# Patient Record
Sex: FEMALE | Race: WHITE | NOT HISPANIC OR LATINO | Employment: FULL TIME | URBAN - METROPOLITAN AREA
[De-identification: names, ages, dates, MRNs, and addresses within clinical notes are randomized per-mention and may not be internally consistent; named-entity substitution may affect disease eponyms.]

---

## 2017-01-28 ENCOUNTER — OFFICE VISIT (OUTPATIENT)
Dept: URGENT CARE | Facility: CLINIC | Age: 72
End: 2017-01-28
Payer: COMMERCIAL

## 2017-01-28 ENCOUNTER — TRANSCRIBE ORDERS (OUTPATIENT)
Dept: URGENT CARE | Facility: CLINIC | Age: 72
End: 2017-01-28

## 2017-01-28 ENCOUNTER — APPOINTMENT (OUTPATIENT)
Dept: LAB | Facility: CLINIC | Age: 72
End: 2017-01-28
Attending: PHYSICIAN ASSISTANT
Payer: COMMERCIAL

## 2017-01-28 DIAGNOSIS — R35.0 FREQUENCY OF MICTURITION: ICD-10-CM

## 2017-01-28 LAB — GLUCOSE SERPL-MCNC: 183 MG/DL (ref 65–140)

## 2017-01-28 PROCEDURE — 82948 REAGENT STRIP/BLOOD GLUCOSE: CPT

## 2017-01-28 PROCEDURE — 87086 URINE CULTURE/COLONY COUNT: CPT

## 2017-01-28 PROCEDURE — G0383 LEV 4 HOSP TYPE B ED VISIT: HCPCS

## 2017-01-30 LAB — BACTERIA UR CULT: NORMAL

## 2017-02-15 ENCOUNTER — TRANSCRIBE ORDERS (OUTPATIENT)
Dept: ADMINISTRATIVE | Facility: HOSPITAL | Age: 72
End: 2017-02-15

## 2017-02-15 DIAGNOSIS — M25.511 RIGHT SHOULDER PAIN, UNSPECIFIED CHRONICITY: ICD-10-CM

## 2017-02-15 DIAGNOSIS — M25.512 LEFT SHOULDER PAIN, UNSPECIFIED CHRONICITY: Primary | ICD-10-CM

## 2017-02-20 ENCOUNTER — HOSPITAL ENCOUNTER (OUTPATIENT)
Dept: MRI IMAGING | Facility: CLINIC | Age: 72
Discharge: HOME | End: 2017-02-20
Payer: COMMERCIAL

## 2017-02-20 DIAGNOSIS — M25.512 LEFT SHOULDER PAIN, UNSPECIFIED CHRONICITY: ICD-10-CM

## 2017-02-20 DIAGNOSIS — M25.511 RIGHT SHOULDER PAIN, UNSPECIFIED CHRONICITY: ICD-10-CM

## 2017-02-20 PROCEDURE — 73221 MRI JOINT UPR EXTREM W/O DYE: CPT

## 2017-05-05 ENCOUNTER — TRANSCRIBE ORDERS (OUTPATIENT)
Dept: LAB | Facility: CLINIC | Age: 72
End: 2017-05-05

## 2017-05-05 ENCOUNTER — APPOINTMENT (OUTPATIENT)
Dept: LAB | Facility: CLINIC | Age: 72
End: 2017-05-05
Payer: COMMERCIAL

## 2017-05-05 DIAGNOSIS — R53.83 FATIGUE, UNSPECIFIED TYPE: Primary | ICD-10-CM

## 2017-05-05 DIAGNOSIS — Z13.9 SCREENING FOR CONDITION: ICD-10-CM

## 2017-05-05 DIAGNOSIS — E11.69 TYPE 2 DIABETES MELLITUS WITH OTHER SPECIFIED COMPLICATION (HCC): ICD-10-CM

## 2017-05-05 DIAGNOSIS — E83.52 HYPERCALCEMIA: ICD-10-CM

## 2017-05-05 DIAGNOSIS — E79.0 URICACIDEMIA: ICD-10-CM

## 2017-05-05 DIAGNOSIS — N20.0 URIC ACID NEPHROLITHIASIS: ICD-10-CM

## 2017-05-05 LAB
ALBUMIN SERPL BCP-MCNC: 3.8 G/DL (ref 3.5–5)
ALP SERPL-CCNC: 54 U/L (ref 46–116)
ALT SERPL W P-5'-P-CCNC: 22 U/L (ref 12–78)
ANION GAP SERPL CALCULATED.3IONS-SCNC: 8 MMOL/L (ref 4–13)
AST SERPL W P-5'-P-CCNC: 12 U/L (ref 5–45)
BASOPHILS # BLD AUTO: 0.02 THOUSANDS/ΜL (ref 0–0.1)
BASOPHILS NFR BLD AUTO: 0 % (ref 0–1)
BILIRUB SERPL-MCNC: 0.59 MG/DL (ref 0.2–1)
BUN SERPL-MCNC: 9 MG/DL (ref 5–25)
CALCIUM SERPL-MCNC: 9.7 MG/DL (ref 8.3–10.1)
CHLORIDE SERPL-SCNC: 107 MMOL/L (ref 100–108)
CHOLEST SERPL-MCNC: 125 MG/DL (ref 50–200)
CO2 SERPL-SCNC: 28 MMOL/L (ref 21–32)
CREAT SERPL-MCNC: 0.78 MG/DL (ref 0.6–1.3)
EOSINOPHIL # BLD AUTO: 0.17 THOUSAND/ΜL (ref 0–0.61)
EOSINOPHIL NFR BLD AUTO: 2 % (ref 0–6)
ERYTHROCYTE [DISTWIDTH] IN BLOOD BY AUTOMATED COUNT: 14.2 % (ref 11.6–15.1)
EST. AVERAGE GLUCOSE BLD GHB EST-MCNC: 134 MG/DL
GFR SERPL CREATININE-BSD FRML MDRD: >60 ML/MIN/1.73SQ M
GLUCOSE P FAST SERPL-MCNC: 127 MG/DL (ref 65–99)
HBA1C MFR BLD: 6.3 % (ref 4.2–6.3)
HCT VFR BLD AUTO: 40.3 % (ref 34.8–46.1)
HDLC SERPL-MCNC: 33 MG/DL (ref 40–60)
HGB BLD-MCNC: 13.3 G/DL (ref 11.5–15.4)
LDLC SERPL CALC-MCNC: 43 MG/DL (ref 0–100)
LYMPHOCYTES # BLD AUTO: 2.13 THOUSANDS/ΜL (ref 0.6–4.47)
LYMPHOCYTES NFR BLD AUTO: 27 % (ref 14–44)
MCH RBC QN AUTO: 28.8 PG (ref 26.8–34.3)
MCHC RBC AUTO-ENTMCNC: 33 G/DL (ref 31.4–37.4)
MCV RBC AUTO: 87 FL (ref 82–98)
MONOCYTES # BLD AUTO: 0.52 THOUSAND/ΜL (ref 0.17–1.22)
MONOCYTES NFR BLD AUTO: 7 % (ref 4–12)
NEUTROPHILS # BLD AUTO: 4.98 THOUSANDS/ΜL (ref 1.85–7.62)
NEUTS SEG NFR BLD AUTO: 64 % (ref 43–75)
NRBC BLD AUTO-RTO: 0 /100 WBCS
PLATELET # BLD AUTO: 204 THOUSANDS/UL (ref 149–390)
PMV BLD AUTO: 11.4 FL (ref 8.9–12.7)
POTASSIUM SERPL-SCNC: 4 MMOL/L (ref 3.5–5.3)
PROT SERPL-MCNC: 7.4 G/DL (ref 6.4–8.2)
PTH-INTACT SERPL-MCNC: 90.1 PG/ML (ref 14–72)
RBC # BLD AUTO: 4.62 MILLION/UL (ref 3.81–5.12)
SODIUM SERPL-SCNC: 143 MMOL/L (ref 136–145)
TRIGL SERPL-MCNC: 244 MG/DL
TSH SERPL DL<=0.05 MIU/L-ACNC: 2.6 UIU/ML (ref 0.36–3.74)
URATE SERPL-MCNC: 5.2 MG/DL (ref 2–6.8)
WBC # BLD AUTO: 7.84 THOUSAND/UL (ref 4.31–10.16)

## 2017-05-05 PROCEDURE — 36415 COLL VENOUS BLD VENIPUNCTURE: CPT

## 2017-05-05 PROCEDURE — 80061 LIPID PANEL: CPT

## 2017-05-05 PROCEDURE — 84443 ASSAY THYROID STIM HORMONE: CPT

## 2017-05-05 PROCEDURE — 83970 ASSAY OF PARATHORMONE: CPT

## 2017-05-05 PROCEDURE — 80053 COMPREHEN METABOLIC PANEL: CPT

## 2017-05-05 PROCEDURE — 84550 ASSAY OF BLOOD/URIC ACID: CPT

## 2017-05-05 PROCEDURE — 85025 COMPLETE CBC W/AUTO DIFF WBC: CPT

## 2017-05-05 PROCEDURE — 83036 HEMOGLOBIN GLYCOSYLATED A1C: CPT

## 2017-11-15 ENCOUNTER — TRANSCRIBE ORDERS (OUTPATIENT)
Dept: LAB | Facility: CLINIC | Age: 72
End: 2017-11-15

## 2017-11-15 ENCOUNTER — APPOINTMENT (OUTPATIENT)
Dept: LAB | Facility: CLINIC | Age: 72
End: 2017-11-15
Payer: COMMERCIAL

## 2017-11-15 DIAGNOSIS — Z13.9 SCREENING FOR CONDITION: ICD-10-CM

## 2017-11-15 DIAGNOSIS — R07.81 PLEURITIC CHEST PAIN: ICD-10-CM

## 2017-11-15 DIAGNOSIS — R00.2 PALPITATIONS: ICD-10-CM

## 2017-11-15 DIAGNOSIS — I10 ESSENTIAL HYPERTENSION, MALIGNANT: ICD-10-CM

## 2017-11-15 DIAGNOSIS — R53.83 FATIGUE, UNSPECIFIED TYPE: Primary | ICD-10-CM

## 2017-11-15 DIAGNOSIS — Z13.220 SCREENING FOR LIPOID DISORDERS: ICD-10-CM

## 2017-11-15 LAB
ALBUMIN SERPL BCP-MCNC: 3.9 G/DL (ref 3.5–5)
ALP SERPL-CCNC: 48 U/L (ref 46–116)
ALT SERPL W P-5'-P-CCNC: 24 U/L (ref 12–78)
ANION GAP SERPL CALCULATED.3IONS-SCNC: 8 MMOL/L (ref 4–13)
AST SERPL W P-5'-P-CCNC: 13 U/L (ref 5–45)
BASOPHILS # BLD AUTO: 0.04 THOUSANDS/ΜL (ref 0–0.1)
BASOPHILS NFR BLD AUTO: 1 % (ref 0–1)
BILIRUB DIRECT SERPL-MCNC: 0.14 MG/DL (ref 0–0.2)
BILIRUB SERPL-MCNC: 0.57 MG/DL (ref 0.2–1)
BUN SERPL-MCNC: 13 MG/DL (ref 5–25)
CALCIUM ALBUM COR SERPL-MCNC: 11 MG/DL (ref 8.3–10.1)
CALCIUM SERPL-MCNC: 10.9 MG/DL (ref 8.3–10.1)
CHLORIDE SERPL-SCNC: 107 MMOL/L (ref 100–108)
CHOLEST SERPL-MCNC: 159 MG/DL (ref 50–200)
CO2 SERPL-SCNC: 26 MMOL/L (ref 21–32)
CREAT SERPL-MCNC: 0.84 MG/DL (ref 0.6–1.3)
EOSINOPHIL # BLD AUTO: 0.22 THOUSAND/ΜL (ref 0–0.61)
EOSINOPHIL NFR BLD AUTO: 3 % (ref 0–6)
ERYTHROCYTE [DISTWIDTH] IN BLOOD BY AUTOMATED COUNT: 13.3 % (ref 11.6–15.1)
GFR SERPL CREATININE-BSD FRML MDRD: 70 ML/MIN/1.73SQ M
GLUCOSE P FAST SERPL-MCNC: 146 MG/DL (ref 65–99)
HCT VFR BLD AUTO: 39.8 % (ref 34.8–46.1)
HDLC SERPL-MCNC: 35 MG/DL (ref 40–60)
HGB BLD-MCNC: 13.8 G/DL (ref 11.5–15.4)
LYMPHOCYTES # BLD AUTO: 2.55 THOUSANDS/ΜL (ref 0.6–4.47)
LYMPHOCYTES NFR BLD AUTO: 34 % (ref 14–44)
MCH RBC QN AUTO: 30.1 PG (ref 26.8–34.3)
MCHC RBC AUTO-ENTMCNC: 34.7 G/DL (ref 31.4–37.4)
MCV RBC AUTO: 87 FL (ref 82–98)
MONOCYTES # BLD AUTO: 0.55 THOUSAND/ΜL (ref 0.17–1.22)
MONOCYTES NFR BLD AUTO: 7 % (ref 4–12)
NEUTROPHILS # BLD AUTO: 4.08 THOUSANDS/ΜL (ref 1.85–7.62)
NEUTS SEG NFR BLD AUTO: 55 % (ref 43–75)
NRBC BLD AUTO-RTO: 0 /100 WBCS
PLATELET # BLD AUTO: 194 THOUSANDS/UL (ref 149–390)
PMV BLD AUTO: 11.4 FL (ref 8.9–12.7)
POTASSIUM SERPL-SCNC: 4.1 MMOL/L (ref 3.5–5.3)
PROT SERPL-MCNC: 7.2 G/DL (ref 6.4–8.2)
RBC # BLD AUTO: 4.58 MILLION/UL (ref 3.81–5.12)
SODIUM SERPL-SCNC: 141 MMOL/L (ref 136–145)
TRIGL SERPL-MCNC: 407 MG/DL
TSH SERPL DL<=0.05 MIU/L-ACNC: 2.8 UIU/ML (ref 0.36–3.74)
WBC # BLD AUTO: 7.45 THOUSAND/UL (ref 4.31–10.16)

## 2017-11-15 PROCEDURE — 82248 BILIRUBIN DIRECT: CPT

## 2017-11-15 PROCEDURE — 80053 COMPREHEN METABOLIC PANEL: CPT

## 2017-11-15 PROCEDURE — 85025 COMPLETE CBC W/AUTO DIFF WBC: CPT

## 2017-11-15 PROCEDURE — 80061 LIPID PANEL: CPT

## 2017-11-15 PROCEDURE — 84443 ASSAY THYROID STIM HORMONE: CPT

## 2017-11-15 PROCEDURE — 36415 COLL VENOUS BLD VENIPUNCTURE: CPT

## 2018-01-22 ENCOUNTER — TRANSCRIBE ORDERS (OUTPATIENT)
Dept: LAB | Facility: CLINIC | Age: 73
End: 2018-01-22

## 2018-01-22 ENCOUNTER — APPOINTMENT (OUTPATIENT)
Dept: LAB | Facility: CLINIC | Age: 73
End: 2018-01-22
Payer: COMMERCIAL

## 2018-01-22 DIAGNOSIS — IMO0001 UNCONTROLLED DIABETES MELLITUS TYPE 2 WITHOUT COMPLICATIONS, UNSPECIFIED LONG TERM INSULIN USE STATUS: ICD-10-CM

## 2018-01-22 DIAGNOSIS — E83.52 HYPERCALCEMIA: Primary | ICD-10-CM

## 2018-01-22 LAB
25(OH)D3 SERPL-MCNC: 11.7 NG/ML (ref 30–100)
ALBUMIN SERPL BCP-MCNC: 4.1 G/DL (ref 3.5–5)
ALP SERPL-CCNC: 54 U/L (ref 46–116)
ALT SERPL W P-5'-P-CCNC: 26 U/L (ref 12–78)
ANION GAP SERPL CALCULATED.3IONS-SCNC: 6 MMOL/L (ref 4–13)
AST SERPL W P-5'-P-CCNC: 13 U/L (ref 5–45)
BASOPHILS # BLD AUTO: 0.03 THOUSANDS/ΜL (ref 0–0.1)
BASOPHILS NFR BLD AUTO: 1 % (ref 0–1)
BILIRUB SERPL-MCNC: 0.51 MG/DL (ref 0.2–1)
BUN SERPL-MCNC: 16 MG/DL (ref 5–25)
CALCIUM ALBUM COR SERPL-MCNC: 10.6 MG/DL (ref 8.3–10.1)
CALCIUM SERPL-MCNC: 10.7 MG/DL (ref 8.3–10.1)
CHLORIDE SERPL-SCNC: 108 MMOL/L (ref 100–108)
CO2 SERPL-SCNC: 26 MMOL/L (ref 21–32)
CORTIS AM PEAK SERPL-MCNC: 25.6 UG/DL (ref 4.2–22.4)
CREAT SERPL-MCNC: 0.95 MG/DL (ref 0.6–1.3)
EOSINOPHIL # BLD AUTO: 0.21 THOUSAND/ΜL (ref 0–0.61)
EOSINOPHIL NFR BLD AUTO: 4 % (ref 0–6)
ERYTHROCYTE [DISTWIDTH] IN BLOOD BY AUTOMATED COUNT: 13.1 % (ref 11.6–15.1)
EST. AVERAGE GLUCOSE BLD GHB EST-MCNC: 146 MG/DL
GFR SERPL CREATININE-BSD FRML MDRD: 60 ML/MIN/1.73SQ M
GLUCOSE P FAST SERPL-MCNC: 159 MG/DL (ref 65–99)
HBA1C MFR BLD: 6.7 % (ref 4.2–6.3)
HCT VFR BLD AUTO: 40 % (ref 34.8–46.1)
HGB BLD-MCNC: 13.5 G/DL (ref 11.5–15.4)
LYMPHOCYTES # BLD AUTO: 1.91 THOUSANDS/ΜL (ref 0.6–4.47)
LYMPHOCYTES NFR BLD AUTO: 32 % (ref 14–44)
MCH RBC QN AUTO: 29.3 PG (ref 26.8–34.3)
MCHC RBC AUTO-ENTMCNC: 33.8 G/DL (ref 31.4–37.4)
MCV RBC AUTO: 87 FL (ref 82–98)
MONOCYTES # BLD AUTO: 0.4 THOUSAND/ΜL (ref 0.17–1.22)
MONOCYTES NFR BLD AUTO: 7 % (ref 4–12)
NEUTROPHILS # BLD AUTO: 3.45 THOUSANDS/ΜL (ref 1.85–7.62)
NEUTS SEG NFR BLD AUTO: 56 % (ref 43–75)
NRBC BLD AUTO-RTO: 0 /100 WBCS
PLATELET # BLD AUTO: 193 THOUSANDS/UL (ref 149–390)
PMV BLD AUTO: 11.6 FL (ref 8.9–12.7)
POTASSIUM SERPL-SCNC: 4 MMOL/L (ref 3.5–5.3)
PROT SERPL-MCNC: 7.4 G/DL (ref 6.4–8.2)
PTH-INTACT SERPL-MCNC: 68.2 PG/ML (ref 14–72)
RBC # BLD AUTO: 4.61 MILLION/UL (ref 3.81–5.12)
SODIUM SERPL-SCNC: 140 MMOL/L (ref 136–145)
TESTOST SERPL-MCNC: 75 NG/DL
TSH SERPL DL<=0.05 MIU/L-ACNC: 3.54 UIU/ML (ref 0.36–3.74)
WBC # BLD AUTO: 6.01 THOUSAND/UL (ref 4.31–10.16)

## 2018-01-22 PROCEDURE — 83970 ASSAY OF PARATHORMONE: CPT

## 2018-01-22 PROCEDURE — 86200 CCP ANTIBODY: CPT

## 2018-01-22 PROCEDURE — 84403 ASSAY OF TOTAL TESTOSTERONE: CPT

## 2018-01-22 PROCEDURE — 84443 ASSAY THYROID STIM HORMONE: CPT

## 2018-01-22 PROCEDURE — 85025 COMPLETE CBC W/AUTO DIFF WBC: CPT

## 2018-01-22 PROCEDURE — 82397 CHEMILUMINESCENT ASSAY: CPT

## 2018-01-22 PROCEDURE — 82306 VITAMIN D 25 HYDROXY: CPT

## 2018-01-22 PROCEDURE — 36415 COLL VENOUS BLD VENIPUNCTURE: CPT

## 2018-01-22 PROCEDURE — 80053 COMPREHEN METABOLIC PANEL: CPT

## 2018-01-22 PROCEDURE — 84166 PROTEIN E-PHORESIS/URINE/CSF: CPT

## 2018-01-22 PROCEDURE — 84165 PROTEIN E-PHORESIS SERUM: CPT

## 2018-01-22 PROCEDURE — 82652 VIT D 1 25-DIHYDROXY: CPT

## 2018-01-22 PROCEDURE — 82533 TOTAL CORTISOL: CPT

## 2018-01-22 PROCEDURE — 83036 HEMOGLOBIN GLYCOSYLATED A1C: CPT

## 2018-01-24 LAB
1,25(OH)2D3 SERPL-MCNC: 30.3 PG/ML (ref 19.9–79.3)
CCP IGA+IGG SERPL IA-ACNC: 5 UNITS (ref 0–19)

## 2018-01-25 LAB
ALBUMIN UR ELPH-MCNC: 100 %
ALPHA1 GLOB MFR UR ELPH: 0 %
ALPHA2 GLOB MFR UR ELPH: 0 %
B-GLOBULIN MFR UR ELPH: 0 %
GAMMA GLOB MFR UR ELPH: 0 %
PROT PATTERN UR ELPH-IMP: ABNORMAL
PROT UR-MCNC: 21 MG/DL

## 2018-01-26 LAB
ALBUMIN SERPL ELPH-MCNC: 4.62 G/DL (ref 3.5–5)
ALBUMIN SERPL ELPH-MCNC: 64.2 % (ref 52–65)
ALPHA1 GLOB SERPL ELPH-MCNC: 0.29 G/DL (ref 0.1–0.4)
ALPHA1 GLOB SERPL ELPH-MCNC: 4 % (ref 2.5–5)
ALPHA2 GLOB SERPL ELPH-MCNC: 0.87 G/DL (ref 0.4–1.2)
ALPHA2 GLOB SERPL ELPH-MCNC: 12.1 % (ref 7–13)
BETA GLOB ABNORMAL SERPL ELPH-MCNC: 0.48 G/DL (ref 0.4–0.8)
BETA1 GLOB SERPL ELPH-MCNC: 6.7 % (ref 5–13)
BETA2 GLOB SERPL ELPH-MCNC: 3.8 % (ref 2–8)
BETA2+GAMMA GLOB SERPL ELPH-MCNC: 0.27 G/DL (ref 0.2–0.5)
GAMMA GLOB ABNORMAL SERPL ELPH-MCNC: 0.66 G/DL (ref 0.5–1.6)
GAMMA GLOB SERPL ELPH-MCNC: 9.2 % (ref 12–22)
IGG/ALB SER: 1.79 {RATIO} (ref 1.1–1.8)
PROT PATTERN SERPL ELPH-IMP: ABNORMAL
PROT SERPL-MCNC: 7.2 G/DL (ref 6.4–8.2)

## 2018-01-26 PROCEDURE — 84165 PROTEIN E-PHORESIS SERUM: CPT | Performed by: PATHOLOGY

## 2018-01-26 PROCEDURE — 84166 PROTEIN E-PHORESIS/URINE/CSF: CPT | Performed by: PATHOLOGY

## 2018-01-29 LAB — PTH RELATED PROT SERPL-SCNC: <1.1 PMOL/L

## 2018-03-20 ENCOUNTER — APPOINTMENT (OUTPATIENT)
Dept: LAB | Facility: CLINIC | Age: 73
End: 2018-03-20
Payer: COMMERCIAL

## 2018-03-20 ENCOUNTER — TRANSCRIBE ORDERS (OUTPATIENT)
Dept: LAB | Facility: CLINIC | Age: 73
End: 2018-03-20

## 2018-03-20 DIAGNOSIS — E28.2 POLYCYSTIC OVARIES: ICD-10-CM

## 2018-03-20 DIAGNOSIS — IMO0001 UNCONTROLLED DIABETES MELLITUS TYPE 2 WITHOUT COMPLICATIONS, UNSPECIFIED LONG TERM INSULIN USE STATUS: ICD-10-CM

## 2018-03-20 DIAGNOSIS — E83.52 HYPERCALCEMIA: Primary | ICD-10-CM

## 2018-03-20 DIAGNOSIS — E83.52 HYPERCALCEMIA: ICD-10-CM

## 2018-03-20 LAB
ALBUMIN SERPL BCP-MCNC: 4 G/DL (ref 3.5–5)
ALP SERPL-CCNC: 44 U/L (ref 46–116)
ALT SERPL W P-5'-P-CCNC: 24 U/L (ref 12–78)
ANION GAP SERPL CALCULATED.3IONS-SCNC: 6 MMOL/L (ref 4–13)
AST SERPL W P-5'-P-CCNC: 12 U/L (ref 5–45)
BILIRUB SERPL-MCNC: 0.7 MG/DL (ref 0.2–1)
BUN SERPL-MCNC: 12 MG/DL (ref 5–25)
CALCIUM ALBUM COR SERPL-MCNC: 10.5 MG/DL (ref 8.3–10.1)
CALCIUM SERPL-MCNC: 10.5 MG/DL (ref 8.3–10.1)
CHLORIDE SERPL-SCNC: 108 MMOL/L (ref 100–108)
CHOLEST SERPL-MCNC: 123 MG/DL (ref 50–200)
CO2 SERPL-SCNC: 27 MMOL/L (ref 21–32)
CORTIS AM PEAK SERPL-MCNC: 29.9 UG/DL (ref 4.2–22.4)
CREAT SERPL-MCNC: 0.85 MG/DL (ref 0.6–1.3)
GFR SERPL CREATININE-BSD FRML MDRD: 69 ML/MIN/1.73SQ M
GLUCOSE P FAST SERPL-MCNC: 125 MG/DL (ref 65–99)
HDLC SERPL-MCNC: 34 MG/DL (ref 40–60)
LDLC SERPL CALC-MCNC: 49 MG/DL (ref 0–100)
POTASSIUM SERPL-SCNC: 3.7 MMOL/L (ref 3.5–5.3)
PROT SERPL-MCNC: 7 G/DL (ref 6.4–8.2)
SODIUM SERPL-SCNC: 141 MMOL/L (ref 136–145)
TESTOST SERPL-MCNC: 68 NG/DL
TRIGL SERPL-MCNC: 199 MG/DL

## 2018-03-20 PROCEDURE — 36415 COLL VENOUS BLD VENIPUNCTURE: CPT

## 2018-03-20 PROCEDURE — 84403 ASSAY OF TOTAL TESTOSTERONE: CPT

## 2018-03-20 PROCEDURE — 82533 TOTAL CORTISOL: CPT

## 2018-03-20 PROCEDURE — 80061 LIPID PANEL: CPT

## 2018-03-20 PROCEDURE — 80053 COMPREHEN METABOLIC PANEL: CPT

## 2018-06-14 ENCOUNTER — APPOINTMENT (OUTPATIENT)
Dept: LAB | Facility: CLINIC | Age: 73
End: 2018-06-14
Payer: COMMERCIAL

## 2018-06-14 ENCOUNTER — TRANSCRIBE ORDERS (OUTPATIENT)
Dept: LAB | Facility: CLINIC | Age: 73
End: 2018-06-14

## 2018-06-14 DIAGNOSIS — IMO0001 UNCONTROLLED DIABETES MELLITUS TYPE 2 WITHOUT COMPLICATIONS, UNSPECIFIED WHETHER LONG TERM INSULIN USE: ICD-10-CM

## 2018-06-14 DIAGNOSIS — IMO0001 UNCONTROLLED DIABETES MELLITUS TYPE 2 WITHOUT COMPLICATIONS, UNSPECIFIED WHETHER LONG TERM INSULIN USE: Primary | ICD-10-CM

## 2018-06-14 LAB
ALBUMIN SERPL BCP-MCNC: 4.1 G/DL (ref 3.5–5)
ALP SERPL-CCNC: 40 U/L (ref 46–116)
ALT SERPL W P-5'-P-CCNC: 29 U/L (ref 12–78)
ANION GAP SERPL CALCULATED.3IONS-SCNC: 7 MMOL/L (ref 4–13)
AST SERPL W P-5'-P-CCNC: 14 U/L (ref 5–45)
BILIRUB SERPL-MCNC: 0.7 MG/DL (ref 0.2–1)
BUN SERPL-MCNC: 14 MG/DL (ref 5–25)
CALCIUM ALBUM COR SERPL-MCNC: 11.2 MG/DL (ref 8.3–10.1)
CALCIUM SERPL-MCNC: 11.3 MG/DL (ref 8.3–10.1)
CHLORIDE SERPL-SCNC: 107 MMOL/L (ref 100–108)
CO2 SERPL-SCNC: 26 MMOL/L (ref 21–32)
CREAT SERPL-MCNC: 0.87 MG/DL (ref 0.6–1.3)
EST. AVERAGE GLUCOSE BLD GHB EST-MCNC: 131 MG/DL
GFR SERPL CREATININE-BSD FRML MDRD: 67 ML/MIN/1.73SQ M
GLUCOSE P FAST SERPL-MCNC: 93 MG/DL (ref 65–99)
HBA1C MFR BLD: 6.2 % (ref 4.2–6.3)
POTASSIUM SERPL-SCNC: 4.3 MMOL/L (ref 3.5–5.3)
PROT SERPL-MCNC: 7.1 G/DL (ref 6.4–8.2)
SODIUM SERPL-SCNC: 140 MMOL/L (ref 136–145)

## 2018-06-14 PROCEDURE — 80053 COMPREHEN METABOLIC PANEL: CPT

## 2018-06-14 PROCEDURE — 83036 HEMOGLOBIN GLYCOSYLATED A1C: CPT

## 2018-06-14 PROCEDURE — 36415 COLL VENOUS BLD VENIPUNCTURE: CPT

## 2018-07-17 ENCOUNTER — TRANSCRIBE ORDERS (OUTPATIENT)
Dept: LAB | Facility: CLINIC | Age: 73
End: 2018-07-17

## 2018-07-17 ENCOUNTER — APPOINTMENT (OUTPATIENT)
Dept: LAB | Facility: CLINIC | Age: 73
End: 2018-07-17
Payer: COMMERCIAL

## 2018-07-17 DIAGNOSIS — E11.8 UNCONTROLLED TYPE 2 DIABETES MELLITUS WITH COMPLICATION, UNSPECIFIED WHETHER LONG TERM INSULIN USE: ICD-10-CM

## 2018-07-17 DIAGNOSIS — E11.8 UNCONTROLLED TYPE 2 DIABETES MELLITUS WITH COMPLICATION, UNSPECIFIED WHETHER LONG TERM INSULIN USE: Primary | ICD-10-CM

## 2018-07-17 DIAGNOSIS — E11.65 UNCONTROLLED TYPE 2 DIABETES MELLITUS WITH COMPLICATION, UNSPECIFIED WHETHER LONG TERM INSULIN USE: ICD-10-CM

## 2018-07-17 DIAGNOSIS — E83.52 HYPERCALCEMIA: ICD-10-CM

## 2018-07-17 DIAGNOSIS — E55.9 AVITAMINOSIS D: ICD-10-CM

## 2018-07-17 DIAGNOSIS — E11.65 UNCONTROLLED TYPE 2 DIABETES MELLITUS WITH COMPLICATION, UNSPECIFIED WHETHER LONG TERM INSULIN USE: Primary | ICD-10-CM

## 2018-07-17 LAB
25(OH)D3 SERPL-MCNC: 31.7 NG/ML (ref 30–100)
ALBUMIN SERPL BCP-MCNC: 4.1 G/DL (ref 3.5–5)
ALP SERPL-CCNC: 45 U/L (ref 46–116)
ALT SERPL W P-5'-P-CCNC: 28 U/L (ref 12–78)
ANION GAP SERPL CALCULATED.3IONS-SCNC: 4 MMOL/L (ref 4–13)
AST SERPL W P-5'-P-CCNC: 17 U/L (ref 5–45)
BILIRUB SERPL-MCNC: 0.66 MG/DL (ref 0.2–1)
BUN SERPL-MCNC: 18 MG/DL (ref 5–25)
CALCIUM 24H UR-MCNC: 115.6 MG/24 HRS (ref 42–353)
CALCIUM ALBUM COR SERPL-MCNC: 11.6 MG/DL (ref 8.3–10.1)
CALCIUM SERPL-MCNC: 11.7 MG/DL (ref 8.3–10.1)
CHLORIDE SERPL-SCNC: 109 MMOL/L (ref 100–108)
CO2 SERPL-SCNC: 26 MMOL/L (ref 21–32)
CORTIS AM PEAK SERPL-MCNC: 26.8 UG/DL (ref 4.2–22.4)
CREAT SERPL-MCNC: 1.03 MG/DL (ref 0.6–1.3)
GFR SERPL CREATININE-BSD FRML MDRD: 54 ML/MIN/1.73SQ M
GLUCOSE SERPL-MCNC: 112 MG/DL (ref 65–140)
POTASSIUM SERPL-SCNC: 4.5 MMOL/L (ref 3.5–5.3)
PROT SERPL-MCNC: 7.5 G/DL (ref 6.4–8.2)
PTH-INTACT SERPL-MCNC: 71 PG/ML (ref 18.4–80.1)
SODIUM SERPL-SCNC: 139 MMOL/L (ref 136–145)
SPECIMEN VOL UR: 1700 ML

## 2018-07-17 PROCEDURE — 80053 COMPREHEN METABOLIC PANEL: CPT

## 2018-07-17 PROCEDURE — 82306 VITAMIN D 25 HYDROXY: CPT

## 2018-07-17 PROCEDURE — 82533 TOTAL CORTISOL: CPT

## 2018-07-17 PROCEDURE — 36415 COLL VENOUS BLD VENIPUNCTURE: CPT

## 2018-07-17 PROCEDURE — 82340 ASSAY OF CALCIUM IN URINE: CPT

## 2018-07-17 PROCEDURE — 83970 ASSAY OF PARATHORMONE: CPT

## 2018-08-20 ENCOUNTER — TRANSCRIBE ORDERS (OUTPATIENT)
Dept: LAB | Facility: CLINIC | Age: 73
End: 2018-08-20

## 2018-08-20 ENCOUNTER — APPOINTMENT (OUTPATIENT)
Dept: LAB | Facility: CLINIC | Age: 73
End: 2018-08-20
Payer: COMMERCIAL

## 2018-08-20 DIAGNOSIS — E28.2 POLYCYSTIC OVARIES: ICD-10-CM

## 2018-08-20 DIAGNOSIS — E24.9 CUSHING'S SYNDROME (HCC): Primary | ICD-10-CM

## 2018-08-20 DIAGNOSIS — E24.9 CUSHING'S SYNDROME (HCC): ICD-10-CM

## 2018-08-20 LAB
ALBUMIN SERPL BCP-MCNC: 4.2 G/DL (ref 3.5–5)
ALP SERPL-CCNC: 47 U/L (ref 46–116)
ALT SERPL W P-5'-P-CCNC: 29 U/L (ref 12–78)
ANION GAP SERPL CALCULATED.3IONS-SCNC: 9 MMOL/L (ref 4–13)
AST SERPL W P-5'-P-CCNC: 18 U/L (ref 5–45)
BILIRUB SERPL-MCNC: 0.38 MG/DL (ref 0.2–1)
BUN SERPL-MCNC: 25 MG/DL (ref 5–25)
CALCIUM ALBUM COR SERPL-MCNC: 10.8 MG/DL (ref 8.3–10.1)
CALCIUM SERPL-MCNC: 11 MG/DL (ref 8.3–10.1)
CHLORIDE SERPL-SCNC: 106 MMOL/L (ref 100–108)
CO2 SERPL-SCNC: 23 MMOL/L (ref 21–32)
CORTIS AM PEAK SERPL-MCNC: 2.3 UG/DL (ref 4.2–22.4)
CREAT SERPL-MCNC: 1.04 MG/DL (ref 0.6–1.3)
GFR SERPL CREATININE-BSD FRML MDRD: 54 ML/MIN/1.73SQ M
GLUCOSE P FAST SERPL-MCNC: 145 MG/DL (ref 65–99)
POTASSIUM SERPL-SCNC: 4.7 MMOL/L (ref 3.5–5.3)
PROT SERPL-MCNC: 7.5 G/DL (ref 6.4–8.2)
SODIUM SERPL-SCNC: 138 MMOL/L (ref 136–145)
TESTOST SERPL-MCNC: 34 NG/DL

## 2018-08-20 PROCEDURE — 36415 COLL VENOUS BLD VENIPUNCTURE: CPT

## 2018-08-20 PROCEDURE — 80053 COMPREHEN METABOLIC PANEL: CPT

## 2018-08-20 PROCEDURE — 84403 ASSAY OF TOTAL TESTOSTERONE: CPT

## 2018-08-20 PROCEDURE — 82533 TOTAL CORTISOL: CPT

## 2018-08-28 ENCOUNTER — TRANSCRIBE ORDERS (OUTPATIENT)
Dept: ADMINISTRATIVE | Facility: HOSPITAL | Age: 73
End: 2018-08-28

## 2018-08-28 DIAGNOSIS — E24.9 CUSHING'S SYNDROME (HCC): Primary | ICD-10-CM

## 2018-08-31 ENCOUNTER — TRANSCRIBE ORDERS (OUTPATIENT)
Dept: LAB | Facility: CLINIC | Age: 73
End: 2018-08-31

## 2018-08-31 ENCOUNTER — APPOINTMENT (OUTPATIENT)
Dept: LAB | Facility: CLINIC | Age: 73
End: 2018-08-31
Payer: COMMERCIAL

## 2018-08-31 ENCOUNTER — HOSPITAL ENCOUNTER (OUTPATIENT)
Dept: MRI IMAGING | Facility: HOSPITAL | Age: 73
Discharge: HOME | End: 2018-08-31
Payer: COMMERCIAL

## 2018-08-31 DIAGNOSIS — E24.9 CUSHING'S SYNDROME (HCC): Primary | ICD-10-CM

## 2018-08-31 DIAGNOSIS — E24.9 CUSHING'S SYNDROME (HCC): ICD-10-CM

## 2018-08-31 LAB
ALBUMIN SERPL BCP-MCNC: 4 G/DL (ref 3.5–5)
ALP SERPL-CCNC: 38 U/L (ref 46–116)
ALT SERPL W P-5'-P-CCNC: 26 U/L (ref 12–78)
ANION GAP SERPL CALCULATED.3IONS-SCNC: 8 MMOL/L (ref 4–13)
AST SERPL W P-5'-P-CCNC: 18 U/L (ref 5–45)
BILIRUB SERPL-MCNC: 0.62 MG/DL (ref 0.2–1)
BUN SERPL-MCNC: 18 MG/DL (ref 5–25)
CALCIUM ALBUM COR SERPL-MCNC: 11 MG/DL (ref 8.3–10.1)
CALCIUM SERPL-MCNC: 11 MG/DL (ref 8.3–10.1)
CHLORIDE SERPL-SCNC: 107 MMOL/L (ref 100–108)
CO2 SERPL-SCNC: 27 MMOL/L (ref 21–32)
CORTIS AM PEAK SERPL-MCNC: 24.3 UG/DL (ref 4.2–22.4)
CREAT SERPL-MCNC: 0.98 MG/DL (ref 0.6–1.3)
GFR SERPL CREATININE-BSD FRML MDRD: 58 ML/MIN/1.73SQ M
GLUCOSE P FAST SERPL-MCNC: 89 MG/DL (ref 65–99)
POTASSIUM SERPL-SCNC: 4.3 MMOL/L (ref 3.5–5.3)
PROT SERPL-MCNC: 7.3 G/DL (ref 6.4–8.2)
SODIUM SERPL-SCNC: 142 MMOL/L (ref 136–145)

## 2018-08-31 PROCEDURE — 80053 COMPREHEN METABOLIC PANEL: CPT

## 2018-08-31 PROCEDURE — 74183 MRI ABD W/O CNTR FLWD CNTR: CPT

## 2018-08-31 PROCEDURE — A9585 GADOBUTROL INJECTION: HCPCS | Performed by: RADIOLOGY

## 2018-08-31 PROCEDURE — 36415 COLL VENOUS BLD VENIPUNCTURE: CPT

## 2018-08-31 PROCEDURE — 82533 TOTAL CORTISOL: CPT

## 2018-08-31 PROCEDURE — 82024 ASSAY OF ACTH: CPT

## 2018-08-31 RX ADMIN — GADOBUTROL 9 ML: 604.72 INJECTION INTRAVENOUS at 14:52

## 2018-09-01 LAB — ACTH PLAS-MCNC: 33.5 PG/ML (ref 7.2–63.3)

## 2018-09-25 ENCOUNTER — APPOINTMENT (OUTPATIENT)
Dept: LAB | Facility: CLINIC | Age: 73
End: 2018-09-25
Payer: COMMERCIAL

## 2018-09-25 ENCOUNTER — TRANSCRIBE ORDERS (OUTPATIENT)
Dept: LAB | Facility: CLINIC | Age: 73
End: 2018-09-25

## 2018-09-25 DIAGNOSIS — E24.9 CUSHING'S SYNDROME (HCC): ICD-10-CM

## 2018-09-25 DIAGNOSIS — E24.9 CUSHING'S SYNDROME (HCC): Primary | ICD-10-CM

## 2018-09-25 LAB
ALBUMIN SERPL BCP-MCNC: 3.6 G/DL (ref 3.5–5)
ALP SERPL-CCNC: 34 U/L (ref 46–116)
ALT SERPL W P-5'-P-CCNC: 17 U/L (ref 12–78)
ANION GAP SERPL CALCULATED.3IONS-SCNC: 6 MMOL/L (ref 4–13)
AST SERPL W P-5'-P-CCNC: 12 U/L (ref 5–45)
BILIRUB SERPL-MCNC: 0.78 MG/DL (ref 0.2–1)
BUN SERPL-MCNC: 14 MG/DL (ref 5–25)
CALCIUM ALBUM COR SERPL-MCNC: 10.5 MG/DL (ref 8.3–10.1)
CALCIUM SERPL-MCNC: 10.2 MG/DL (ref 8.3–10.1)
CHLORIDE SERPL-SCNC: 106 MMOL/L (ref 100–108)
CO2 SERPL-SCNC: 29 MMOL/L (ref 21–32)
CORTIS AM PEAK SERPL-MCNC: 47.9 UG/DL (ref 4.2–22.4)
CREAT SERPL-MCNC: 0.92 MG/DL (ref 0.6–1.3)
GFR SERPL CREATININE-BSD FRML MDRD: 62 ML/MIN/1.73SQ M
GLUCOSE P FAST SERPL-MCNC: 103 MG/DL (ref 65–99)
POTASSIUM SERPL-SCNC: 3.6 MMOL/L (ref 3.5–5.3)
PROT SERPL-MCNC: 6.8 G/DL (ref 6.4–8.2)
SODIUM SERPL-SCNC: 141 MMOL/L (ref 136–145)
TSH SERPL DL<=0.05 MIU/L-ACNC: 2.97 UIU/ML (ref 0.36–3.74)

## 2018-09-25 PROCEDURE — 84443 ASSAY THYROID STIM HORMONE: CPT

## 2018-09-25 PROCEDURE — 82533 TOTAL CORTISOL: CPT

## 2018-09-25 PROCEDURE — 36415 COLL VENOUS BLD VENIPUNCTURE: CPT

## 2018-09-25 PROCEDURE — 80053 COMPREHEN METABOLIC PANEL: CPT

## 2018-10-19 ENCOUNTER — HOSPITAL ENCOUNTER (INPATIENT)
Facility: HOSPITAL | Age: 73
LOS: 1 days | DRG: 871 | End: 2018-10-20
Admitting: INTERNAL MEDICINE
Payer: COMMERCIAL

## 2018-10-19 ENCOUNTER — APPOINTMENT (EMERGENCY)
Dept: CT IMAGING | Facility: HOSPITAL | Age: 73
DRG: 871 | End: 2018-10-19
Payer: COMMERCIAL

## 2018-10-19 ENCOUNTER — APPOINTMENT (EMERGENCY)
Dept: RADIOLOGY | Facility: HOSPITAL | Age: 73
DRG: 871 | End: 2018-10-19
Payer: COMMERCIAL

## 2018-10-19 ENCOUNTER — APPOINTMENT (INPATIENT)
Dept: CT IMAGING | Facility: HOSPITAL | Age: 73
DRG: 871 | End: 2018-10-19
Payer: COMMERCIAL

## 2018-10-19 DIAGNOSIS — E87.6 HYPOKALEMIA: ICD-10-CM

## 2018-10-19 DIAGNOSIS — R41.82 CHANGE IN MENTAL STATUS: ICD-10-CM

## 2018-10-19 DIAGNOSIS — A41.9 SEPSIS (HCC): Primary | ICD-10-CM

## 2018-10-19 DIAGNOSIS — A41.9 SEPSIS, DUE TO UNSPECIFIED ORGANISM: ICD-10-CM

## 2018-10-19 PROBLEM — R65.20 SEVERE SEPSIS (HCC): Status: ACTIVE | Noted: 2018-10-19

## 2018-10-19 PROBLEM — I10 HYPERTENSION: Status: ACTIVE | Noted: 2017-01-28

## 2018-10-19 PROBLEM — G92.8 TOXIC METABOLIC ENCEPHALOPATHY: Status: ACTIVE | Noted: 2018-10-19

## 2018-10-19 PROBLEM — E24.0 CUSHING'S DISEASE (HCC): Chronic | Status: ACTIVE | Noted: 2018-10-19

## 2018-10-19 PROBLEM — E11.9 DIABETES MELLITUS (HCC): Status: ACTIVE | Noted: 2017-01-28

## 2018-10-19 LAB
ALBUMIN SERPL BCP-MCNC: 4.7 G/DL (ref 3.5–5.7)
ALP SERPL-CCNC: 33 U/L (ref 55–165)
ALT SERPL W P-5'-P-CCNC: 11 U/L (ref 7–52)
AMMONIA PLAS-SCNC: 67 UMOL/L (ref 25–90)
ANION GAP SERPL CALCULATED.3IONS-SCNC: 11 MMOL/L (ref 4–13)
ANION GAP SERPL CALCULATED.3IONS-SCNC: 8 MMOL/L (ref 4–13)
ANION GAP SERPL CALCULATED.3IONS-SCNC: 8 MMOL/L (ref 4–13)
APTT PPP: 29 SECONDS (ref 24–36)
ARTERIAL PATENCY WRIST A: YES
AST SERPL W P-5'-P-CCNC: 15 U/L (ref 13–39)
BACTERIA UR QL AUTO: ABNORMAL /HPF
BASE EXCESS BLDA CALC-SCNC: 0.4 MMOL/L (ref -2–3)
BASOPHILS # BLD AUTO: 0 THOUSANDS/ΜL (ref 0–0.1)
BASOPHILS NFR BLD AUTO: 0 % (ref 0–2)
BILIRUB SERPL-MCNC: 0.9 MG/DL (ref 0.2–1)
BILIRUB UR QL STRIP: NEGATIVE
BODY TEMPERATURE: 98.6 DEGREES FEHRENHEIT
BUN SERPL-MCNC: 8 MG/DL (ref 7–25)
BUN SERPL-MCNC: 8 MG/DL (ref 7–25)
BUN SERPL-MCNC: 9 MG/DL (ref 7–25)
CALCIUM SERPL-MCNC: 11 MG/DL (ref 8.6–10.5)
CALCIUM SERPL-MCNC: 9.6 MG/DL (ref 8.6–10.5)
CALCIUM SERPL-MCNC: 9.7 MG/DL (ref 8.6–10.5)
CHLORIDE SERPL-SCNC: 100 MMOL/L (ref 98–107)
CHLORIDE SERPL-SCNC: 101 MMOL/L (ref 98–107)
CHLORIDE SERPL-SCNC: 97 MMOL/L (ref 98–107)
CLARITY UR: CLEAR
CO2 SERPL-SCNC: 30 MMOL/L (ref 21–31)
CO2 SERPL-SCNC: 31 MMOL/L (ref 21–31)
CO2 SERPL-SCNC: 32 MMOL/L (ref 21–31)
COLOR UR: ABNORMAL
CREAT SERPL-MCNC: 0.83 MG/DL (ref 0.6–1.2)
CREAT SERPL-MCNC: 0.83 MG/DL (ref 0.6–1.2)
CREAT SERPL-MCNC: 0.88 MG/DL (ref 0.6–1.2)
EOSINOPHIL # BLD AUTO: 0 THOUSAND/ΜL (ref 0–0.61)
EOSINOPHIL NFR BLD AUTO: 0 % (ref 0–5)
ERYTHROCYTE [DISTWIDTH] IN BLOOD BY AUTOMATED COUNT: 12.7 % (ref 11.5–14.5)
EST. AVERAGE GLUCOSE BLD GHB EST-MCNC: 108 MG/DL
GFR SERPL CREATININE-BSD FRML MDRD: 66 ML/MIN/1.73SQ M
GFR SERPL CREATININE-BSD FRML MDRD: 71 ML/MIN/1.73SQ M
GFR SERPL CREATININE-BSD FRML MDRD: 71 ML/MIN/1.73SQ M
GLUCOSE SERPL-MCNC: 107 MG/DL (ref 65–140)
GLUCOSE SERPL-MCNC: 115 MG/DL (ref 65–140)
GLUCOSE SERPL-MCNC: 120 MG/DL (ref 65–140)
GLUCOSE SERPL-MCNC: 122 MG/DL (ref 65–99)
GLUCOSE SERPL-MCNC: 123 MG/DL (ref 65–99)
GLUCOSE SERPL-MCNC: 133 MG/DL (ref 65–99)
GLUCOSE UR STRIP-MCNC: ABNORMAL MG/DL
HBA1C MFR BLD: 5.4 % (ref 4.2–6.3)
HCO3 BLDA-SCNC: 24.9 MMOL/L (ref 22–28)
HCT VFR BLD AUTO: 39 % (ref 34.8–46.1)
HGB BLD-MCNC: 12.9 G/DL (ref 12–16)
HGB UR QL STRIP.AUTO: ABNORMAL
INR PPP: 1.19 (ref 0.9–1.5)
KETONES UR STRIP-MCNC: NEGATIVE MG/DL
LACTATE SERPL-SCNC: 1.5 MMOL/L (ref 0.5–2)
LACTATE SERPL-SCNC: 2.6 MMOL/L (ref 0.5–2)
LEUKOCYTE ESTERASE UR QL STRIP: ABNORMAL
LYMPHOCYTES # BLD AUTO: 1.6 THOUSANDS/ΜL (ref 0.6–4.47)
LYMPHOCYTES NFR BLD AUTO: 12 % (ref 21–51)
MCH RBC QN AUTO: 29.3 PG (ref 26–34)
MCHC RBC AUTO-ENTMCNC: 33.1 G/DL (ref 31–37)
MCV RBC AUTO: 88 FL (ref 81–99)
MONOCYTES # BLD AUTO: 0.8 THOUSAND/ΜL (ref 0.17–1.22)
MONOCYTES NFR BLD AUTO: 7 % (ref 2–12)
MUCOUS THREADS UR QL AUTO: ABNORMAL
NASAL CANNULA: 2
NEUTROPHILS # BLD AUTO: 10.5 THOUSANDS/ΜL (ref 1.4–6.5)
NEUTS SEG NFR BLD AUTO: 81 % (ref 42–75)
NITRITE UR QL STRIP: NEGATIVE
NON-SQ EPI CELLS URNS QL MICRO: ABNORMAL /HPF
NRBC BLD AUTO-RTO: 0 /100 WBCS
O2 CT BLDA-SCNC: 12.9 ML/DL
OXYHGB MFR BLDA: 95.2 % (ref 94–100)
PCO2 BLDA: 42.1 MM HG (ref 35–45)
PH BLDA: 7.39 [PH] (ref 7.35–7.45)
PH UR STRIP.AUTO: 7.5 [PH] (ref 5–8)
PLATELET # BLD AUTO: 188 THOUSANDS/UL (ref 149–390)
PLATELET # BLD AUTO: 227 THOUSANDS/UL (ref 149–390)
PMV BLD AUTO: 9.2 FL (ref 8.6–11.7)
PO2 BLDA: 81 MM HG (ref 80–100)
POTASSIUM SERPL-SCNC: 2.6 MMOL/L (ref 3.5–5.5)
POTASSIUM SERPL-SCNC: 3 MMOL/L (ref 3.5–5.5)
POTASSIUM SERPL-SCNC: 3 MMOL/L (ref 3.5–5.5)
PROT SERPL-MCNC: 7 G/DL (ref 6.4–8.9)
PROT UR STRIP-MCNC: NEGATIVE MG/DL
PROTHROMBIN TIME: 13.8 SECONDS (ref 10.1–12.9)
RBC # BLD AUTO: 4.41 MILLION/UL (ref 3.9–5.2)
RBC #/AREA URNS AUTO: ABNORMAL /HPF
SODIUM SERPL-SCNC: 139 MMOL/L (ref 134–143)
SODIUM SERPL-SCNC: 139 MMOL/L (ref 134–143)
SODIUM SERPL-SCNC: 140 MMOL/L (ref 134–143)
SP GR UR STRIP.AUTO: 1.01 (ref 1–1.03)
SPECIMEN SOURCE: NORMAL
TROPONIN I SERPL-MCNC: 0.04 NG/ML
TROPONIN I SERPL-MCNC: 0.05 NG/ML
TROPONIN I SERPL-MCNC: <0.03 NG/ML
UROBILINOGEN UR QL STRIP.AUTO: 0.2 E.U./DL
WBC # BLD AUTO: 12.9 THOUSAND/UL (ref 4.8–10.8)
WBC #/AREA URNS AUTO: ABNORMAL /HPF

## 2018-10-19 PROCEDURE — 99222 1ST HOSP IP/OBS MODERATE 55: CPT | Performed by: INTERNAL MEDICINE

## 2018-10-19 PROCEDURE — 96365 THER/PROPH/DIAG IV INF INIT: CPT

## 2018-10-19 PROCEDURE — 74176 CT ABD & PELVIS W/O CONTRAST: CPT

## 2018-10-19 PROCEDURE — 82948 REAGENT STRIP/BLOOD GLUCOSE: CPT

## 2018-10-19 PROCEDURE — 87086 URINE CULTURE/COLONY COUNT: CPT | Performed by: INTERNAL MEDICINE

## 2018-10-19 PROCEDURE — 93005 ELECTROCARDIOGRAM TRACING: CPT

## 2018-10-19 PROCEDURE — 87147 CULTURE TYPE IMMUNOLOGIC: CPT

## 2018-10-19 PROCEDURE — 87077 CULTURE AEROBIC IDENTIFY: CPT

## 2018-10-19 PROCEDURE — 83036 HEMOGLOBIN GLYCOSYLATED A1C: CPT | Performed by: INTERNAL MEDICINE

## 2018-10-19 PROCEDURE — 87040 BLOOD CULTURE FOR BACTERIA: CPT

## 2018-10-19 PROCEDURE — 85025 COMPLETE CBC W/AUTO DIFF WBC: CPT

## 2018-10-19 PROCEDURE — 36600 WITHDRAWAL OF ARTERIAL BLOOD: CPT

## 2018-10-19 PROCEDURE — 83605 ASSAY OF LACTIC ACID: CPT

## 2018-10-19 PROCEDURE — 84484 ASSAY OF TROPONIN QUANT: CPT

## 2018-10-19 PROCEDURE — 36415 COLL VENOUS BLD VENIPUNCTURE: CPT

## 2018-10-19 PROCEDURE — 84145 PROCALCITONIN (PCT): CPT | Performed by: INTERNAL MEDICINE

## 2018-10-19 PROCEDURE — 99285 EMERGENCY DEPT VISIT HI MDM: CPT

## 2018-10-19 PROCEDURE — 80048 BASIC METABOLIC PNL TOTAL CA: CPT | Performed by: INTERNAL MEDICINE

## 2018-10-19 PROCEDURE — 87186 SC STD MICRODIL/AGAR DIL: CPT

## 2018-10-19 PROCEDURE — 82805 BLOOD GASES W/O2 SATURATION: CPT | Performed by: INTERNAL MEDICINE

## 2018-10-19 PROCEDURE — 70450 CT HEAD/BRAIN W/O DYE: CPT

## 2018-10-19 PROCEDURE — 96374 THER/PROPH/DIAG INJ IV PUSH: CPT

## 2018-10-19 PROCEDURE — 82140 ASSAY OF AMMONIA: CPT | Performed by: INTERNAL MEDICINE

## 2018-10-19 PROCEDURE — 81001 URINALYSIS AUTO W/SCOPE: CPT

## 2018-10-19 PROCEDURE — 80053 COMPREHEN METABOLIC PANEL: CPT

## 2018-10-19 PROCEDURE — 96375 TX/PRO/DX INJ NEW DRUG ADDON: CPT

## 2018-10-19 PROCEDURE — 85049 AUTOMATED PLATELET COUNT: CPT | Performed by: INTERNAL MEDICINE

## 2018-10-19 PROCEDURE — 71045 X-RAY EXAM CHEST 1 VIEW: CPT

## 2018-10-19 PROCEDURE — 85610 PROTHROMBIN TIME: CPT

## 2018-10-19 PROCEDURE — 85730 THROMBOPLASTIN TIME PARTIAL: CPT

## 2018-10-19 RX ORDER — KETOROLAC TROMETHAMINE 30 MG/ML
15 INJECTION, SOLUTION INTRAMUSCULAR; INTRAVENOUS ONCE
Status: COMPLETED | OUTPATIENT
Start: 2018-10-19 | End: 2018-10-19

## 2018-10-19 RX ORDER — ONDANSETRON 2 MG/ML
4 INJECTION INTRAMUSCULAR; INTRAVENOUS ONCE
Status: COMPLETED | OUTPATIENT
Start: 2018-10-19 | End: 2018-10-19

## 2018-10-19 RX ORDER — LOSARTAN POTASSIUM 50 MG/1
100 TABLET ORAL DAILY
Status: DISCONTINUED | OUTPATIENT
Start: 2018-10-19 | End: 2018-10-20 | Stop reason: HOSPADM

## 2018-10-19 RX ORDER — HEPARIN SODIUM 5000 [USP'U]/ML
5000 INJECTION, SOLUTION INTRAVENOUS; SUBCUTANEOUS EVERY 8 HOURS SCHEDULED
Status: DISCONTINUED | OUTPATIENT
Start: 2018-10-19 | End: 2018-10-20 | Stop reason: HOSPADM

## 2018-10-19 RX ORDER — CEFEPIME HYDROCHLORIDE 2 G/1
2000 INJECTION, POWDER, FOR SOLUTION INTRAVENOUS EVERY 12 HOURS
Status: DISCONTINUED | OUTPATIENT
Start: 2018-10-19 | End: 2018-10-19

## 2018-10-19 RX ORDER — VANCOMYCIN HYDROCHLORIDE 1 G/200ML
1000 INJECTION, SOLUTION INTRAVENOUS ONCE
Status: COMPLETED | OUTPATIENT
Start: 2018-10-19 | End: 2018-10-19

## 2018-10-19 RX ORDER — ACETAMINOPHEN 650 MG/1
650 SUPPOSITORY RECTAL ONCE
Status: COMPLETED | OUTPATIENT
Start: 2018-10-19 | End: 2018-10-19

## 2018-10-19 RX ORDER — SODIUM CHLORIDE 9 MG/ML
250 INJECTION, SOLUTION INTRAVENOUS CONTINUOUS
Status: DISCONTINUED | OUTPATIENT
Start: 2018-10-19 | End: 2018-10-20 | Stop reason: HOSPADM

## 2018-10-19 RX ORDER — POTASSIUM CHLORIDE 20 MEQ/1
40 TABLET, EXTENDED RELEASE ORAL ONCE
Status: COMPLETED | OUTPATIENT
Start: 2018-10-19 | End: 2018-10-19

## 2018-10-19 RX ORDER — METFORMIN HYDROCHLORIDE 1000 MG/1
1000 TABLET, FILM COATED, EXTENDED RELEASE ORAL 2 TIMES DAILY WITH MEALS
COMMUNITY
End: 2018-10-28 | Stop reason: HOSPADM

## 2018-10-19 RX ORDER — POTASSIUM CHLORIDE 20 MEQ/1
40 TABLET, EXTENDED RELEASE ORAL ONCE
Status: DISCONTINUED | OUTPATIENT
Start: 2018-10-19 | End: 2018-10-19

## 2018-10-19 RX ORDER — POTASSIUM CHLORIDE 14.9 MG/ML
20 INJECTION INTRAVENOUS
Status: COMPLETED | OUTPATIENT
Start: 2018-10-19 | End: 2018-10-19

## 2018-10-19 RX ORDER — ATORVASTATIN CALCIUM 20 MG/1
20 TABLET, FILM COATED ORAL
Status: DISCONTINUED | OUTPATIENT
Start: 2018-10-20 | End: 2018-10-20 | Stop reason: HOSPADM

## 2018-10-19 RX ORDER — LOSARTAN POTASSIUM 100 MG/1
100 TABLET ORAL DAILY
COMMUNITY

## 2018-10-19 RX ORDER — ATORVASTATIN CALCIUM 20 MG/1
20 TABLET, FILM COATED ORAL DAILY
COMMUNITY

## 2018-10-19 RX ORDER — DILTIAZEM HYDROCHLORIDE 240 MG/1
240 CAPSULE, COATED, EXTENDED RELEASE ORAL DAILY
Status: DISCONTINUED | OUTPATIENT
Start: 2018-10-20 | End: 2018-10-20 | Stop reason: HOSPADM

## 2018-10-19 RX ORDER — ASPIRIN 81 MG/1
81 TABLET ORAL DAILY
Status: DISCONTINUED | OUTPATIENT
Start: 2018-10-20 | End: 2018-10-20 | Stop reason: HOSPADM

## 2018-10-19 RX ORDER — ACETAMINOPHEN 325 MG/1
650 TABLET ORAL EVERY 6 HOURS PRN
Status: DISCONTINUED | OUTPATIENT
Start: 2018-10-19 | End: 2018-10-20 | Stop reason: HOSPADM

## 2018-10-19 RX ORDER — POTASSIUM CHLORIDE 14.9 MG/ML
20 INJECTION INTRAVENOUS
Status: DISCONTINUED | OUTPATIENT
Start: 2018-10-19 | End: 2018-10-19

## 2018-10-19 RX ORDER — CLOPIDOGREL BISULFATE 75 MG/1
75 TABLET ORAL DAILY
Status: DISCONTINUED | OUTPATIENT
Start: 2018-10-20 | End: 2018-10-20 | Stop reason: HOSPADM

## 2018-10-19 RX ORDER — ASPIRIN 81 MG/1
81 TABLET ORAL DAILY
COMMUNITY

## 2018-10-19 RX ORDER — SODIUM CHLORIDE AND POTASSIUM CHLORIDE .9; .15 G/100ML; G/100ML
125 SOLUTION INTRAVENOUS CONTINUOUS
Status: DISCONTINUED | OUTPATIENT
Start: 2018-10-19 | End: 2018-10-20 | Stop reason: HOSPADM

## 2018-10-19 RX ORDER — CLOPIDOGREL BISULFATE 75 MG/1
75 TABLET ORAL DAILY
COMMUNITY

## 2018-10-19 RX ORDER — DILTIAZEM HYDROCHLORIDE 240 MG/1
240 CAPSULE, EXTENDED RELEASE ORAL DAILY
COMMUNITY

## 2018-10-19 RX ADMIN — HEPARIN SODIUM 5000 UNITS: 5000 INJECTION INTRAVENOUS; SUBCUTANEOUS at 22:29

## 2018-10-19 RX ADMIN — VANCOMYCIN HYDROCHLORIDE 1000 MG: 1 INJECTION, SOLUTION INTRAVENOUS at 12:50

## 2018-10-19 RX ADMIN — SODIUM CHLORIDE 250 ML/HR: 9 INJECTION, SOLUTION INTRAVENOUS at 14:14

## 2018-10-19 RX ADMIN — CEFEPIME HYDROCHLORIDE 2000 MG: 2 INJECTION, SOLUTION INTRAVENOUS at 22:28

## 2018-10-19 RX ADMIN — SODIUM CHLORIDE 2000 ML: 0.9 INJECTION, SOLUTION INTRAVENOUS at 11:54

## 2018-10-19 RX ADMIN — POTASSIUM CHLORIDE AND SODIUM CHLORIDE 125 ML/HR: 900; 150 INJECTION, SOLUTION INTRAVENOUS at 15:26

## 2018-10-19 RX ADMIN — POTASSIUM CHLORIDE 20 MEQ: 200 INJECTION, SOLUTION INTRAVENOUS at 16:29

## 2018-10-19 RX ADMIN — CEFEPIME HYDROCHLORIDE 2000 MG: 2 INJECTION, SOLUTION INTRAVENOUS at 11:53

## 2018-10-19 RX ADMIN — HEPARIN SODIUM 5000 UNITS: 5000 INJECTION INTRAVENOUS; SUBCUTANEOUS at 16:29

## 2018-10-19 RX ADMIN — ACETAMINOPHEN 650 MG: 650 SUPPOSITORY RECTAL at 11:53

## 2018-10-19 RX ADMIN — ONDANSETRON HYDROCHLORIDE 4 MG: 2 INJECTION INTRAMUSCULAR; INTRAVENOUS at 11:49

## 2018-10-19 RX ADMIN — POTASSIUM CHLORIDE 40 MEQ: 1500 TABLET, EXTENDED RELEASE ORAL at 20:50

## 2018-10-19 RX ADMIN — KETOROLAC TROMETHAMINE 15 MG: 30 INJECTION, SOLUTION INTRAMUSCULAR at 11:52

## 2018-10-19 RX ADMIN — LOSARTAN POTASSIUM 100 MG: 50 TABLET, FILM COATED ORAL at 16:30

## 2018-10-19 RX ADMIN — POTASSIUM CHLORIDE 20 MEQ: 200 INJECTION, SOLUTION INTRAVENOUS at 13:05

## 2018-10-19 NOTE — ASSESSMENT & PLAN NOTE
· Likely related to sepsis  · CT head unremarkable  · No focal neuro deficits  · We will treat as noted above  · Patient has had issues with hypercalcemia in the past, although her admission calcium was noted to be 11  · We will trend and monitor calcium

## 2018-10-19 NOTE — ASSESSMENT & PLAN NOTE
· Unclear source although perhaps urinary  · Severe sepsis due to fever, elevated white blood cell count, and lactic acidosis  · We will treat patient with broad-spectrum antibiotics, namely vancomycin cefepime pending final culture results  · Check procalcitonin and trend  · Trend lactate  · IV fluid resuscitation

## 2018-10-19 NOTE — SOCIAL WORK
Chart reviewed by case management, assessment completed at the bedside with the pt and son, pt lives with her daughter cD Momin in a 2 story home, 12-14 steps inside nad 3 steps outside, pt still works as a research director, pt has 1 drink/year, pt is independent, pt has a rx plan at [facility], family will transport the pt home when stable, pt is here for sepsis, pt is on oxygen and she does not have home oxygen, will continue to follow and assess for any additional d/c needs, CM reviewed d/c planning process including the following: identifying help at home, patient preference for d/c planning needs, availability of treatment team to discuss questions or concerns patient and/or family may have regarding understanding medications and recognizing signs and symptoms once discharged  CM also encouraged patient to follow up with all recommended appointments after discharge  Patient advised of importance for patient and family to participate in managing patients medical well being

## 2018-10-19 NOTE — CONSULTS
Vancomycin Assessment    Kirsty Pitts is a 68 y o  female who is currently receiving vancomycin for Sepsis source unclear   Relevant clinical data and objective history reviewed:  Creatinine   Date Value Ref Range Status   10/19/2018 0 88 0 60 - 1 20 mg/dL Final     Comment:     Standardized to IDMS reference method   09/25/2018 0 92 0 60 - 1 30 mg/dL Final     Comment:     Standardized to IDMS reference method   08/31/2018 0 98 0 60 - 1 30 mg/dL Final     Comment:     Standardized to IDMS reference method     BP (!) 174/79 (BP Location: Left arm)   Pulse 73   Temp 99 1 °F (37 3 °C) (Temporal)   Resp 20   Ht 5' 3" (1 6 m)   Wt 86 2 kg (190 lb 1 oz)   SpO2 98%   BMI 33 67 kg/m²   No intake/output data recorded  Lab Results   Component Value Date/Time    BUN 9 10/19/2018 11:31 AM    WBC 12 90 (H) 10/19/2018 11:31 AM    HGB 12 9 10/19/2018 11:31 AM    HCT 39 0 10/19/2018 11:31 AM    MCV 88 10/19/2018 11:31 AM     10/19/2018 11:31 AM     Temp Readings from Last 3 Encounters:   10/19/18 99 1 °F (37 3 °C) (Temporal)     Vancomycin Days of Therapy: 1    Assessment/Plan  The patient is currently on vancomycin utilizing scheduled dosing  We are trying to achieve a goal of 15-20 (appropriate for most indications) ; therefore, the patient will be started on vancomycin 750 mg IV Q12 Hrs   Pharmacy will follow closely for s/sx of nephrotoxicity, infusion reactions and appropriateness of therapy  BMP and CBC will be ordered per protocol  Plan for trough as patient approaches steady state, prior to the 5th  dose at approximately 1230 on 10/21/18  Pharmacy will continue to follow the patients culture results and clinical progress daily      Hector Aguilera, Pharmacist

## 2018-10-19 NOTE — ED NOTES
Dr Naida Nair in to see pt   Pt still has slurred speech and at times garbled speech  Unable to complete Dysphagia screening  Both Dr Naida Nair, [facility]  And Dr Sourav Tracy made aware       Melissa Garland RN  10/19/18 7106

## 2018-10-19 NOTE — H&P
H&P- Rico Nava [employer] 1945, 68 y o  female MRN: [id number]    Unit/Bed#: ED 05 Encounter: [id number]    Primary Care Provider: Edgar Morillo   Date and time admitted to hospital: 10/19/2018 11:20 AM        * Severe sepsis ([facility] )   Assessment & Plan    · Unclear source although perhaps urinary  · Severe sepsis due to fever, elevated white blood cell count, and lactic acidosis  · We will treat patient with broad-spectrum antibiotics, namely vancomycin cefepime pending final culture results  · Check procalcitonin and trend  · Trend lactate  · IV fluid resuscitation     Toxic metabolic encephalopathy   Assessment & Plan    · Likely related to sepsis  · CT head unremarkable  · No focal neuro deficits  · We will treat as noted above  · Patient has had issues with hypercalcemia in the past, although her admission calcium was noted to be 11  · We will trend and monitor calcium     Hypokalemia   Assessment & Plan    · Replete potassium  · Trend electrolytes     Diabetes mellitus ([facility] )   Assessment & Plan    Lab Results   Component Value Date    HGBA1C 6 2 06/14/2018       Recent Labs      10/19/18   1134   POCGLU  120       Blood Sugar Average: Last 72 hrs:  (P) 120     · Hold metformin for now  · Sliding scale insulin     Cushing's disease ([facility] )   Assessment & Plan    · On korlym as outpatient     Hypertension   Assessment & Plan    · Accelerated hypertension in the ER  · Continue home antihypertensive for now       VTE Prophylaxis: Heparin  Code Status: full  POLST: POLST is not applicable to this patient  Discussion with family:daughters at bedside    Anticipated Length of Stay:  Patient will be admitted on an Inpatient basis with an anticipated length of stay of  > 2 midnights  Justification for Hospital Stay:   Severe sepsis    Total Time for Visit, including Counseling / Coordination of Care: 45 minutes  Greater than 50% of this total time spent on direct patient counseling and coordination of care      Chief Complaint:     Altered mental status    History of Present Illness:    Nayla Kent is a 68 y o  female who presents with altered mental status which began this morning  Patient is lethargic and unable to give a history, as result history was obtained from the daughters were at bedside  Apparently patient was in her usual state of health up until yesterday evening  At this morning, her daughter noted her to be altered, very weak, and not in her usual state of health  She was subsequent brought into the emergency department for further evaluation  She was noted to be febrile, with a leukocytosis and elevated lactate  Upon my examination, patient denies any pain or discomfort anywhere and has no localizing symptoms  Patient's daughter denies any recent hospitalizations, surgeries, or changes in her medical condition  She does have a known history of Cushing's disease for which she is on korlym, and history of atrial fibrillation for which she is on aspirin, statin, diltiazem  Review of Systems:  Review of Systems   Unable to perform ROS: Mental status change       Past Medical and Surgical History:   Past Medical History:   Diagnosis Date    A-fib ([facility] )     Cushing's disease ([facility] )     Diabetes mellitus ([facility] )     Hypertension     Kidney stones     Migraine     Renal disorder     Rotator cuff injury 01/2017    bilat       Past Surgical History:   Procedure Laterality Date    HYSTERECTOMY         Meds/Allergies:  Prior to Admission medications    Medication Sig Start Date End Date Taking?  Authorizing Provider   aspirin (ECOTRIN LOW STRENGTH) 81 mg EC tablet Take 81 mg by mouth daily   Yes Historical Provider, MD   atorvastatin (LIPITOR) 20 mg tablet Take 20 mg by mouth daily   Yes Historical Provider, MD   clopidogrel (PLAVIX) 75 mg tablet Take 75 mg by mouth daily   Yes Historical Provider, MD   diltiazem (DILACOR XR) 240 MG 24 hr capsule Take 240 mg by mouth daily   Yes Historical Provider, MD losartan (COZAAR) 100 MG tablet Take 100 mg by mouth daily   Yes Historical Provider, MD   metFORMIN (GLUMETZA) 1000 MG (MOD) 24 hr tablet Take 1,000 mg by mouth 2 (two) times a day with meals   Yes Historical Provider, MD   MiFEPRIStone (KORLYM) 300 MG TABS Take 300 mg by mouth daily   Yes Historical Provider, MD     I have reviewed home medications with patient family member  Allergies: No Known Allergies    Social History:  Marital Status: /Civil Union   Occupation: clinical   Patient Pre-hospital Living Situation: home  Patient Pre-hospital Level of Mobility: good  Patient Pre-hospital Diet Restrictions: diabetic  Substance Use History:     History   Alcohol Use No     History   Smoking Status    Never Smoker   Smokeless Tobacco    Never Used     History   Drug Use No       Family History:  I have reviewed the patients family history    Physical Exam:   Vitals:   Blood Pressure: (!) 187/84 (10/19/18 1300)  Pulse: 78 (10/19/18 1330)  Temperature: (!) 101 1 °F (38 4 °C) (10/19/18 1130)  Temp Source: Tympanic (10/19/18 1130)  Respirations: 19 (10/19/18 1330)  Height: 5' 3" (160 cm) (10/19/18 1203)  Weight - Scale: 84 4 kg (186 lb) (10/19/18 1203)  SpO2: 97 % (10/19/18 1330)    Physical Exam   Constitutional: She appears well-developed and well-nourished  HENT:   Head: Normocephalic and atraumatic  Eyes: Pupils are equal, round, and reactive to light  EOM are normal    Neck: Normal range of motion  Neck supple  Cardiovascular: Normal rate, regular rhythm and normal heart sounds  Pulmonary/Chest: Effort normal and breath sounds normal  No respiratory distress  She has no wheezes  Abdominal: Soft  Bowel sounds are normal  She exhibits no distension  There is no tenderness  Obesity noted   Musculoskeletal: Normal range of motion  Neurological:   Lethargic, although no focal neuro deficits   Skin: Skin is warm  No rash noted  No erythema     Psychiatric:   Unable to assess due to mental status change   Nursing note and vitals reviewed  Additional Data:   Lab Results: I have personally reviewed pertinent reports  Results from last 7 days  Lab Units 10/19/18  1131   WBC Thousand/uL 12 90*   HEMOGLOBIN g/dL 12 9   HEMATOCRIT % 39 0   PLATELETS Thousands/uL 227   NEUTROS PCT % 81*   LYMPHS PCT % 12*   MONOS PCT % 7   EOS PCT % 0       Results from last 7 days  Lab Units 10/19/18  1131   SODIUM mmol/L 140   POTASSIUM mmol/L 2 6*   CHLORIDE mmol/L 97*   CO2 mmol/L 32*   BUN mg/dL 9   CREATININE mg/dL 0 88   CALCIUM mg/dL 11 0*   ALK PHOS U/L 33*   ALT U/L 11   AST U/L 15       Results from last 7 days  Lab Units 10/19/18  1131   INR  1 19       Results from last 7 days  Lab Units 10/19/18  1134   POC GLUCOSE mg/dl 120           Imaging: I have personally reviewed pertinent reports  XR chest 1 view   Final Result by Barry Barthel, MD (10/19 4755)   Normal study        Signed by Minesh Henderson MD      CT head without contrast   Final Result by Barry Barthel, MD (10/19 0299)   Normal study        Signed by Minesh Henderson MD          EKG, Pathology, and Other Studies Reviewed on Admission:   · EKG: n/a    NetAccess/Epic Records Reviewed: Yes     ** Please Note: This note has been constructed using a voice recognition system   **

## 2018-10-19 NOTE — ED PROVIDER NOTES
History  Chief Complaint   Patient presents with    Altered Mental Status     pt last seen normal at 2100 lastnight  @ 10am today pt found to be confused with garbled speech  Pt able to say her name on arrival to ED  Lisbeth Ruiz is a 68year old female who was brought to the emergency department by ambulance crew due to mental status changes which was not is by her daughter at about 10 o'clock this morning  Patient was noted to be normal yesterday  Onset of symptoms were unknown  On arrival in the emergency department, patient is warm to touch and he is having chills  History provided by:  EMS personnel   used: No    Altered Mental Status   Presenting symptoms: lethargy and partial responsiveness    Severity:  Moderate  Most recent episode: Today  Episode history:  Unable to specify  Progression:  Unchanged  Chronicity:  New  Context: not alcohol use, not dementia, not drug use, not head injury, not homeless, taking medications as prescribed, not nursing home resident, not recent change in medication, not recent illness and not recent infection    Associated symptoms: fever and nausea    Associated symptoms: no abdominal pain, normal movement, no agitation, no bladder incontinence, no decreased appetite, no depression, no difficulty breathing, no eye deviation, no hallucinations, no headaches, no light-headedness, no palpitations, no rash, no seizures, no slurred speech, no suicidal behavior, no visual change, no vomiting and no weakness    Fever:     Fever duration: Today  Timing:  Constant    Temp source:  Subjective    Progression:  Unchanged  Nausea:     Severity:  Mild    Onset quality:  Sudden    Nausea duration: Today  Timing:  Constant    Progression:  Unchanged      Prior to Admission Medications   Prescriptions Last Dose Informant Patient Reported? Taking?    MiFEPRIStone (KORLYM) 300 MG TABS   Yes Yes   Sig: Take 300 mg by mouth daily   aspirin (ECOTRIN LOW STRENGTH) 81 mg EC tablet   Yes Yes   Sig: Take 81 mg by mouth daily   atorvastatin (LIPITOR) 20 mg tablet   Yes Yes   Sig: Take 20 mg by mouth daily   clopidogrel (PLAVIX) 75 mg tablet   Yes Yes   Sig: Take 75 mg by mouth daily   diltiazem (DILACOR XR) 240 MG 24 hr capsule   Yes Yes   Sig: Take 240 mg by mouth daily   losartan (COZAAR) 100 MG tablet   Yes Yes   Sig: Take 100 mg by mouth daily   metFORMIN (GLUMETZA) 1000 MG (MOD) 24 hr tablet   Yes Yes   Sig: Take 1,000 mg by mouth 2 (two) times a day with meals      Facility-Administered Medications: None       Past Medical History:   Diagnosis Date    [facility]fib ([facility] )     Cushing's disease ([facility] )     Diabetes mellitus ([facility] )     Hypertension     Kidney stones     Migraine     Renal disorder     Rotator cuff injury 01/2017    bilat       Past Surgical History:   Procedure Laterality Date    HYSTERECTOMY         History reviewed  No pertinent family history  I have reviewed and agree with the history as documented  Social History   Substance Use Topics    Smoking status: Never Smoker    Smokeless tobacco: Never Used    Alcohol use No        Review of Systems   Constitutional: Positive for fever  Negative for decreased appetite  HENT: Negative  Eyes: Negative  Respiratory: Negative  Cardiovascular: Negative for palpitations  Gastrointestinal: Positive for nausea  Negative for abdominal pain and vomiting  Endocrine: Negative  Genitourinary: Negative  Negative for bladder incontinence  Musculoskeletal: Negative  Skin: Negative for rash  Allergic/Immunologic: Negative  Neurological: Negative for seizures, weakness, light-headedness and headaches  Psychiatric/Behavioral: Negative for agitation and hallucinations  Physical Exam  Physical Exam   Constitutional: She appears well-developed and well-nourished  She appears lethargic  No distress  HENT:   Head: Normocephalic and atraumatic     Right Ear: External ear normal    Left Ear: External ear normal    Nose: Nose normal    Mouth/Throat: Oropharynx is clear and moist  No oropharyngeal exudate  Eyes: Pupils are equal, round, and reactive to light  Conjunctivae and EOM are normal  Right eye exhibits no discharge  Left eye exhibits no discharge  No scleral icterus  Neck: Normal range of motion  Neck supple  No tracheal deviation present  No thyromegaly present  Cardiovascular: Normal rate, regular rhythm, normal heart sounds and intact distal pulses  Pulmonary/Chest: Effort normal and breath sounds normal  No respiratory distress  Abdominal: Soft  Bowel sounds are normal  She exhibits no distension  Musculoskeletal: Normal range of motion  She exhibits no edema, tenderness or deformity  Lymphadenopathy:     She has no cervical adenopathy  Neurological: She appears lethargic  No cranial nerve deficit or sensory deficit  She exhibits normal muscle tone  Coordination normal    Skin: Skin is warm and dry  No rash noted  She is not diaphoretic  No erythema  No pallor  Psychiatric: Her affect is blunt  Her speech is delayed  She is slowed  Cognition and memory are impaired  Nursing note and vitals reviewed        Vital Signs  ED Triage Vitals   Temperature Pulse Respirations Blood Pressure SpO2   10/19/18 1130 10/19/18 1130 10/19/18 1130 10/19/18 1130 10/19/18 1130   (!) 101 1 °F (38 4 °C) 76 22 (!) 189/85 98 %      Temp Source Heart Rate Source Patient Position - Orthostatic VS BP Location FiO2 (%)   10/19/18 1130 10/19/18 1348 -- 10/19/18 1347 --   Tympanic Monitor  Right arm       Pain Score       10/19/18 1138       3           Vitals:    10/19/18 1315 10/19/18 1330 10/19/18 1347 10/19/18 1348   BP:   (!) 207/88 (!) 207/88   Pulse: 70 78  70       Visual Acuity  Visual Acuity      Most Recent Value   L Pupil Size (mm)  3   R Pupil Size (mm)  3          ED Medications  Medications   potassium chloride 20 mEq IVPB (premix) (not administered)   sodium chloride 0 9 % bolus 2,000 mL (0 mL Intravenous Stopped 10/19/18 1239)   ondansetron (ZOFRAN) injection 4 mg (4 mg Intravenous Given 10/19/18 1149)   ketorolac (TORADOL) injection 15 mg (15 mg Intravenous Given 10/19/18 1152)   acetaminophen (TYLENOL) rectal suppository 650 mg (650 mg Rectal Given 10/19/18 1153)   vancomycin (VANCOCIN) IVPB (premix) 1,000 mg (1,000 mg Intravenous New Bag 10/19/18 1250)   cefepime (MAXIPIME) IVPB (premix) 2,000 mg (0 mg Intravenous Stopped 10/19/18 1249)       Diagnostic Studies  Results Reviewed     Procedure Component Value Units Date/Time    Lactic acid, plasma [[id number]] Collected:  10/19/18 1337    Lab Status: In process Specimen:  Blood from Arm, Right Updated:  10/19/18 1343    Comprehensive metabolic panel [[id number]]  (Abnormal) Collected:  10/19/18 1131    Lab Status:  Final result Specimen:  Blood from Arm, Left Updated:  10/19/18 1232     Sodium 140 mmol/L      Potassium 2 6 (LL) mmol/L      Chloride 97 (L) mmol/L      CO2 32 (H) mmol/L      ANION GAP 11 mmol/L      BUN 9 mg/dL      Creatinine 0 88 mg/dL      Glucose 133 (H) mg/dL      Calcium 11 0 (H) mg/dL      AST 15 U/L      ALT 11 U/L      Alkaline Phosphatase 33 (L) U/L      Total Protein 7 0 g/dL      Albumin 4 7 g/dL      Total Bilirubin 0 90 mg/dL      eGFR 66 ml/min/1 73sq m     Narrative:         National Kidney Disease Education Program recommendations are as follows:  GFR calculation is accurate only with a steady state creatinine  Chronic Kidney disease less than 60 ml/min/1 73 sq  meters  Kidney failure less than 15 ml/min/1 73 sq  meters      Troponin I [[id number]]  (Normal) Collected:  10/19/18 1131    Lab Status:  Final result Specimen:  Blood from Arm, Left Updated:  10/19/18 1229     Troponin I <0 03 ng/mL     Urine Microscopic [[id number]]  (Abnormal) Collected:  10/19/18 1202    Lab Status:  Final result Specimen:  Urine from Urine, Clean Catch Updated:  10/19/18 1222     RBC, UA 2-4 (A) /hpf      WBC, UA 2-4 (A) /hpf Epithelial Cells Occasional /hpf      Bacteria, UA Occasional /hpf      MUCOUS THREADS Occasional (A)    Lactic acid, plasma [[id number]]  (Abnormal) Collected:  10/19/18 1131    Lab Status:  Final result Specimen:  Blood from Arm, Left Updated:  10/19/18 1218     LACTIC ACID 2 6 (HH) mmol/L     Narrative:         Result may be elevated if tourniquet was used during collection      UA w Reflex to Microscopic w Reflex to Culture [[id number]]  (Abnormal) Collected:  10/19/18 1202    Lab Status:  Final result Specimen:  Urine from Urine, Clean Catch Updated:  10/19/18 1211     Color, UA Straw     Clarity, UA Clear     Specific Gravity, UA 1 015     pH, UA 7 5     Leukocytes, UA Trace (A)     Nitrite, UA Negative     Protein, UA Negative mg/dl      Glucose, UA 1+ (A) mg/dl      Ketones, UA Negative mg/dl      Urobilinogen, UA 0 2 E U /dl      Bilirubin, UA Negative     Blood, UA Trace-Intact (A)    Troponin I [[id number]]     Lab Status:  No result Specimen:  Blood     Protime-INR [[id number]]  (Abnormal) Collected:  10/19/18 1131    Lab Status:  Final result Specimen:  Blood from Arm, Left Updated:  10/19/18 1202     Protime 13 8 (H) seconds      INR 1 19    APTT [[id number]]  (Normal) Collected:  10/19/18 1131    Lab Status:  Final result Specimen:  Blood from Arm, Left Updated:  10/19/18 1202     PTT 29 seconds     CBC and differential [[id number]]  (Abnormal) Collected:  10/19/18 1131    Lab Status:  Final result Specimen:  Blood from Arm, Left Updated:  10/19/18 1157     WBC 12 90 (H) Thousand/uL      RBC 4 41 Million/uL      Hemoglobin 12 9 g/dL      Hematocrit 39 0 %      MCV 88 fL      MCH 29 3 pg      MCHC 33 1 g/dL      RDW 12 7 %      MPV 9 2 fL      Platelets 413 Thousands/uL      nRBC 0 /100 WBCs      Neutrophils Relative 81 (H) %      Lymphocytes Relative 12 (L) %      Monocytes Relative 7 %      Eosinophils Relative 0 %      Basophils Relative 0 %      Neutrophils Absolute 10 50 (H) Thousands/µL      Lymphocytes Absolute 1 60 Thousands/µL      Monocytes Absolute 0 80 Thousand/µL      Eosinophils Absolute 0 00 Thousand/µL      Basophils Absolute 0 00 Thousands/µL     Blood culture #2 [[id number]] Collected:  10/19/18 1131    Lab Status: In process Specimen:  Blood from Arm, Left Updated:  10/19/18 1136    Blood culture #1 [[id number]] Collected:  10/19/18 1131    Lab Status: In process Specimen:  Blood from Arm, Left Updated:  10/19/18 1136    Fingerstick Glucose (POCT) [[id number]]  (Normal) Collected:  10/19/18 1134    Lab Status:  Final result Updated:  10/19/18 1135     POC Glucose 120 mg/dl                  XR chest 1 view   Final Result by Luis E Menjivar MD (10/19 7037)   Normal study        Signed by Chava Rice MD      CT head without contrast   Final Result by Luis E Menjivar MD (10/19 6409)   Normal study        Signed by Chava Rice MD                 Procedures  ECG 12 Lead Documentation  Date/Time: 10/19/2018 11:29 AM  Performed by: Taiwo Carmen  Authorized by: SHIRA Fountain     Indications / Diagnosis:  Mental status change  ECG reviewed by me, the ED Provider: yes    Patient location:  ED  Previous ECG:     Previous ECG:  Unavailable    Comparison to cardiac monitor: No    Interpretation:     Interpretation: non-specific    Quality:     Tracing quality:  Limited by artifact  Rate:     ECG rate:  74 beats per minute    ECG rate assessment: normal    Rhythm:     Rhythm: sinus rhythm    Ectopy:     Ectopy: none    QRS:     QRS axis:  Normal    QRS intervals:  Normal  Conduction:     Conduction: normal    ST segments:     ST segments:  Non-specific  T waves:     T waves: non-specific             Phone Contacts  ED Phone Contact    ED Course  ED Course as of Oct 19 1356   Fri Oct 19, 2018   1302 Discussed with Dr Tawana Wong, Hospitalist on call about plan for admission and he agreed  [id number] Test results were discussed with the patient and her family at the bedside  MDM  Number of Diagnoses or Management Options  Change in mental status: new and requires workup  Hypokalemia: new and requires workup  Sepsis [facility]): new and requires workup     Amount and/or Complexity of Data Reviewed  Clinical lab tests: ordered and reviewed  Tests in the radiology section of CPT®: ordered and reviewed  Tests in the medicine section of CPT®: ordered and reviewed  Discussion of test results with the performing providers: yes  Decide to obtain previous medical records or to obtain history from someone other than the patient: yes  Obtain history from someone other than the patient: yes  Review and summarize past medical records: yes  Discuss the patient with other providers: yes  Independent visualization of images, tracings, or specimens: yes    Risk of Complications, Morbidity, and/or Mortality  Presenting problems: moderate  Diagnostic procedures: moderate  Management options: moderate    Patient Progress  Patient progress: stable    The patient presented with a condition in which there was a high probability of imminent or life-threatening deterioration, and critical care services (excluding separately billable procedures) totalled  minutes  Disposition  Final diagnoses:   Sepsis ([facility] )   Hypokalemia   Change in mental status     Time reflects when diagnosis was documented in both MDM as applicable and the Disposition within this note     Time User Action Codes Description Comment    10/19/2018 12:32 PM Nathaniel Hawthorne Add [A41 9] Sepsis ([facility] )     10/19/2018 12:32 PM Nathaniel Hawthorne Add [E87 6] Hypokalemia     10/19/2018 12:32 PM Shelley Radford Add [R41 82] Change in mental status       ED Disposition     ED Disposition Condition Comment    Admit  Case was discussed with Dr Eleazar Isidro, Hospitalist on call, and the patient's admission status was agreed to be Admission Status: inpatient status to the service of Dr Eleazar Isidro           Follow-up Information    None Patient's Medications   Discharge Prescriptions    No medications on file     No discharge procedures on file      ED Provider  Electronically Signed by           Morena Ashley MD  10/19/18 5007

## 2018-10-19 NOTE — PLAN OF CARE
Problem: DISCHARGE PLANNING - CARE MANAGEMENT  Goal: Discharge to post-acute care or home with appropriate resources  INTERVENTIONS:  - Conduct assessment to determine patient/family and health care team treatment goals, and need for post-acute services based on payer coverage, community resources, and patient preferences, and barriers to discharge  - Address psychosocial, clinical, and financial barriers to discharge as identified in assessment in conjunction with the patient/family and health care team  - Arrange appropriate level of post-acute services according to patient's   needs and preference and payer coverage in collaboration with the physician and health care team  - Communicate with and update the patient/family, physician, and health care team regarding progress on the discharge plan  - Arrange appropriate transportation to post-acute venues    D/c home when stable  Outcome: Progressing

## 2018-10-19 NOTE — ASSESSMENT & PLAN NOTE
Lab Results   Component Value Date    HGBA1C 6 2 06/14/2018       Recent Labs      10/19/18   1134   POCGLU  120       Blood Sugar Average: Last 72 hrs:  (P) 120     · Hold metformin for now  · Sliding scale insulin

## 2018-10-20 ENCOUNTER — HOSPITAL ENCOUNTER (INPATIENT)
Facility: HOSPITAL | Age: 73
LOS: 8 days | Discharge: HOME | DRG: 871 | End: 2018-10-28
Attending: INTERNAL MEDICINE | Admitting: INTERNAL MEDICINE
Payer: COMMERCIAL

## 2018-10-20 ENCOUNTER — TELEPHONE (OUTPATIENT)
Dept: MEDSURG UNIT | Facility: HOSPITAL | Age: 73
End: 2018-10-20

## 2018-10-20 VITALS
OXYGEN SATURATION: 95 % | RESPIRATION RATE: 17 BRPM | DIASTOLIC BLOOD PRESSURE: 84 MMHG | HEIGHT: 63 IN | WEIGHT: 190.06 LBS | BODY MASS INDEX: 33.68 KG/M2 | TEMPERATURE: 99.6 F | SYSTOLIC BLOOD PRESSURE: 179 MMHG | HEART RATE: 69 BPM

## 2018-10-20 DIAGNOSIS — G92.8 TOXIC METABOLIC ENCEPHALOPATHY: Primary | ICD-10-CM

## 2018-10-20 DIAGNOSIS — E87.6 HYPOKALEMIA: ICD-10-CM

## 2018-10-20 DIAGNOSIS — I10 HYPERTENSION: ICD-10-CM

## 2018-10-20 DIAGNOSIS — E24.0 CUSHING'S DISEASE (HCC): ICD-10-CM

## 2018-10-20 PROBLEM — G93.40 ACUTE ENCEPHALOPATHY: Status: ACTIVE | Noted: 2018-10-19

## 2018-10-20 PROBLEM — R65.20 SEVERE SEPSIS (HCC): Status: RESOLVED | Noted: 2018-10-19 | Resolved: 2018-10-20

## 2018-10-20 PROBLEM — A41.9 SEVERE SEPSIS (HCC): Status: RESOLVED | Noted: 2018-10-19 | Resolved: 2018-10-20

## 2018-10-20 PROBLEM — G93.40 ACUTE ENCEPHALOPATHY: Chronic | Status: ACTIVE | Noted: 2018-10-19

## 2018-10-20 LAB
ALBUMIN SERPL BCP-MCNC: 4 G/DL (ref 3.5–5.7)
ALP SERPL-CCNC: 27 U/L (ref 55–165)
ALT SERPL W P-5'-P-CCNC: 11 U/L (ref 7–52)
ANION GAP SERPL CALCULATED.3IONS-SCNC: 8 MMOL/L (ref 4–13)
APPEARANCE CSF: CLEAR
AST SERPL W P-5'-P-CCNC: 13 U/L (ref 13–39)
BACTERIA UR CULT: NORMAL
BASOPHILS # BLD AUTO: 0 THOUSANDS/ΜL (ref 0–0.1)
BASOPHILS NFR BLD AUTO: 0 % (ref 0–2)
BILIRUB SERPL-MCNC: 0.9 MG/DL (ref 0.2–1)
BUN SERPL-MCNC: 9 MG/DL (ref 7–25)
CALCIUM SERPL-MCNC: 9.7 MG/DL (ref 8.6–10.5)
CHLORIDE SERPL-SCNC: 103 MMOL/L (ref 98–107)
CO2 SERPL-SCNC: 28 MMOL/L (ref 21–31)
CREAT SERPL-MCNC: 0.89 MG/DL (ref 0.6–1.2)
EOSINOPHIL # BLD AUTO: 0 THOUSAND/ΜL (ref 0–0.61)
EOSINOPHIL NFR BLD AUTO: 0 % (ref 0–5)
ERYTHROCYTE [DISTWIDTH] IN BLOOD BY AUTOMATED COUNT: 13 % (ref 11.5–14.5)
GFR SERPL CREATININE-BSD FRML MDRD: 65 ML/MIN/1.73SQ M
GLUCOSE CSF-MCNC: 71 MG/DL (ref 50–80)
GLUCOSE SERPL-MCNC: 105 MG/DL (ref 65–140)
GLUCOSE SERPL-MCNC: 119 MG/DL (ref 65–140)
GLUCOSE SERPL-MCNC: 126 MG/DL (ref 65–99)
GRAM STN SPEC: NORMAL
HCT VFR BLD AUTO: 32.2 % (ref 34.8–46.1)
HGB BLD-MCNC: 11.3 G/DL (ref 12–16)
LYMPHOCYTES # BLD AUTO: 1.7 THOUSANDS/ΜL (ref 0.6–4.47)
LYMPHOCYTES NFR BLD AUTO: 17 % (ref 21–51)
LYMPHOCYTES NFR CSF MANUAL: 1 %
MAGNESIUM SERPL-MCNC: 1.3 MG/DL (ref 1.9–2.7)
MCH RBC QN AUTO: 30.8 PG (ref 26–34)
MCHC RBC AUTO-ENTMCNC: 35 G/DL (ref 31–37)
MCV RBC AUTO: 88 FL (ref 81–99)
MONOCYTES # BLD AUTO: 0.6 THOUSAND/ΜL (ref 0.17–1.22)
MONOCYTES NFR BLD AUTO: 7 % (ref 2–12)
MONONUC CELLS NFR CSF MANUAL: 11 %
NEUTROPHILS # BLD AUTO: 7.3 THOUSANDS/ΜL (ref 1.4–6.5)
NEUTROPHILS NFR CSF MANUAL: 88 %
NEUTS SEG NFR BLD AUTO: 75 % (ref 42–75)
NRBC BLD AUTO-RTO: 0 /100 WBCS
PLATELET # BLD AUTO: 172 THOUSANDS/UL (ref 149–390)
PMV BLD AUTO: 9.7 FL (ref 8.6–11.7)
POTASSIUM SERPL-SCNC: 3 MMOL/L (ref 3.5–5.5)
PROCALCITONIN SERPL-MCNC: <0.05 NG/ML
PROCALCITONIN SERPL-MCNC: <0.05 NG/ML
PROT CSF-MCNC: 72 MG/DL (ref 15–45)
PROT SERPL-MCNC: 6.1 G/DL (ref 6.4–8.9)
RBC # BLD AUTO: 3.65 MILLION/UL (ref 3.9–5.2)
SODIUM SERPL-SCNC: 139 MMOL/L (ref 134–143)
TOTAL CELLS COUNTED BLD: NO
TOTAL CELLS COUNTED SPEC: 100
TROPONIN I SERPL-MCNC: 0.03 NG/ML
TUBE # CSF: 4
WBC # BLD AUTO: 9.7 THOUSAND/UL (ref 4.8–10.8)
WBC # CSF AUTO: 62 /UL (ref 0–5)

## 2018-10-20 PROCEDURE — 84145 PROCALCITONIN (PCT): CPT | Performed by: INTERNAL MEDICINE

## 2018-10-20 PROCEDURE — 84484 ASSAY OF TROPONIN QUANT: CPT | Performed by: INTERNAL MEDICINE

## 2018-10-20 PROCEDURE — 82948 REAGENT STRIP/BLOOD GLUCOSE: CPT

## 2018-10-20 PROCEDURE — 87798 DETECT AGENT NOS DNA AMP: CPT | Performed by: INTERNAL MEDICINE

## 2018-10-20 PROCEDURE — G8988 SELF CARE GOAL STATUS: HCPCS

## 2018-10-20 PROCEDURE — 87529 HSV DNA AMP PROBE: CPT | Performed by: INTERNAL MEDICINE

## 2018-10-20 PROCEDURE — 84157 ASSAY OF PROTEIN OTHER: CPT | Performed by: INTERNAL MEDICINE

## 2018-10-20 PROCEDURE — 99223 1ST HOSP IP/OBS HIGH 75: CPT | Performed by: INTERNAL MEDICINE

## 2018-10-20 PROCEDURE — 87653 STREP B DNA AMP PROBE: CPT | Performed by: INTERNAL MEDICINE

## 2018-10-20 PROCEDURE — 83735 ASSAY OF MAGNESIUM: CPT | Performed by: INTERNAL MEDICINE

## 2018-10-20 PROCEDURE — 99223 1ST HOSP IP/OBS HIGH 75: CPT | Performed by: PSYCHIATRY & NEUROLOGY

## 2018-10-20 PROCEDURE — 87496 CYTOMEG DNA AMP PROBE: CPT | Performed by: INTERNAL MEDICINE

## 2018-10-20 PROCEDURE — 87070 CULTURE OTHR SPECIMN AEROBIC: CPT | Performed by: PSYCHIATRY & NEUROLOGY

## 2018-10-20 PROCEDURE — 80053 COMPREHEN METABOLIC PANEL: CPT | Performed by: INTERNAL MEDICINE

## 2018-10-20 PROCEDURE — 009U3ZX DRAINAGE OF SPINAL CANAL, PERCUTANEOUS APPROACH, DIAGNOSTIC: ICD-10-PCS | Performed by: INTERNAL MEDICINE

## 2018-10-20 PROCEDURE — 87532 HHV-6 DNA AMP PROBE: CPT | Performed by: INTERNAL MEDICINE

## 2018-10-20 PROCEDURE — 89051 BODY FLUID CELL COUNT: CPT | Performed by: INTERNAL MEDICINE

## 2018-10-20 PROCEDURE — G8987 SELF CARE CURRENT STATUS: HCPCS

## 2018-10-20 PROCEDURE — 87498 ENTEROVIRUS PROBE&REVRS TRNS: CPT | Performed by: INTERNAL MEDICINE

## 2018-10-20 PROCEDURE — 97163 PT EVAL HIGH COMPLEX 45 MIN: CPT

## 2018-10-20 PROCEDURE — 62270 DX LMBR SPI PNXR: CPT | Performed by: PSYCHIATRY & NEUROLOGY

## 2018-10-20 PROCEDURE — 85025 COMPLETE CBC W/AUTO DIFF WBC: CPT | Performed by: INTERNAL MEDICINE

## 2018-10-20 PROCEDURE — G8979 MOBILITY GOAL STATUS: HCPCS

## 2018-10-20 PROCEDURE — 97167 OT EVAL HIGH COMPLEX 60 MIN: CPT

## 2018-10-20 PROCEDURE — 82945 GLUCOSE OTHER FLUID: CPT | Performed by: INTERNAL MEDICINE

## 2018-10-20 PROCEDURE — G8978 MOBILITY CURRENT STATUS: HCPCS

## 2018-10-20 RX ORDER — DILTIAZEM HYDROCHLORIDE 240 MG/1
240 CAPSULE, COATED, EXTENDED RELEASE ORAL DAILY
Status: CANCELLED | OUTPATIENT
Start: 2018-10-21

## 2018-10-20 RX ORDER — HYDRALAZINE HYDROCHLORIDE 20 MG/ML
10 INJECTION INTRAMUSCULAR; INTRAVENOUS EVERY 6 HOURS PRN
Status: CANCELLED | OUTPATIENT
Start: 2018-10-20

## 2018-10-20 RX ORDER — ATORVASTATIN CALCIUM 20 MG/1
20 TABLET, FILM COATED ORAL
Status: CANCELLED | OUTPATIENT
Start: 2018-10-20

## 2018-10-20 RX ORDER — CLOPIDOGREL BISULFATE 75 MG/1
75 TABLET ORAL DAILY
Status: CANCELLED | OUTPATIENT
Start: 2018-10-21

## 2018-10-20 RX ORDER — HYDRALAZINE HYDROCHLORIDE 20 MG/ML
10 INJECTION INTRAMUSCULAR; INTRAVENOUS EVERY 6 HOURS PRN
Status: DISCONTINUED | OUTPATIENT
Start: 2018-10-20 | End: 2018-10-20 | Stop reason: HOSPADM

## 2018-10-20 RX ORDER — ASPIRIN 81 MG/1
81 TABLET ORAL DAILY
Status: CANCELLED | OUTPATIENT
Start: 2018-10-21

## 2018-10-20 RX ORDER — LORAZEPAM 2 MG/ML
INJECTION INTRAMUSCULAR
Status: COMPLETED
Start: 2018-10-20 | End: 2018-10-20

## 2018-10-20 RX ORDER — LABETALOL HYDROCHLORIDE 5 MG/ML
10 INJECTION, SOLUTION INTRAVENOUS EVERY 6 HOURS PRN
Status: DISCONTINUED | OUTPATIENT
Start: 2018-10-20 | End: 2018-10-26

## 2018-10-20 RX ORDER — LOSARTAN POTASSIUM 50 MG/1
100 TABLET ORAL DAILY
Status: CANCELLED | OUTPATIENT
Start: 2018-10-21

## 2018-10-20 RX ORDER — POTASSIUM CHLORIDE 20 MEQ/1
40 TABLET, EXTENDED RELEASE ORAL ONCE
Status: COMPLETED | OUTPATIENT
Start: 2018-10-20 | End: 2018-10-20

## 2018-10-20 RX ORDER — SODIUM CHLORIDE 9 MG/ML
125 INJECTION, SOLUTION INTRAVENOUS CONTINUOUS
Status: DISCONTINUED | OUTPATIENT
Start: 2018-10-20 | End: 2018-10-21

## 2018-10-20 RX ORDER — ACETAMINOPHEN 325 MG/1
650 TABLET ORAL EVERY 6 HOURS PRN
Status: CANCELLED | OUTPATIENT
Start: 2018-10-20

## 2018-10-20 RX ORDER — ACETAMINOPHEN 650 MG/1
325 SUPPOSITORY RECTAL EVERY 4 HOURS PRN
Status: DISCONTINUED | OUTPATIENT
Start: 2018-10-20 | End: 2018-10-21

## 2018-10-20 RX ORDER — SODIUM CHLORIDE AND POTASSIUM CHLORIDE .9; .15 G/100ML; G/100ML
125 SOLUTION INTRAVENOUS CONTINUOUS
Status: CANCELLED | OUTPATIENT
Start: 2018-10-20

## 2018-10-20 RX ORDER — HEPARIN SODIUM 5000 [USP'U]/ML
5000 INJECTION, SOLUTION INTRAVENOUS; SUBCUTANEOUS EVERY 8 HOURS SCHEDULED
Status: CANCELLED | OUTPATIENT
Start: 2018-10-20

## 2018-10-20 RX ADMIN — VANCOMYCIN HYDROCHLORIDE 750 MG: 750 INJECTION, SOLUTION INTRAVENOUS at 15:48

## 2018-10-20 RX ADMIN — POTASSIUM CHLORIDE AND SODIUM CHLORIDE 125 ML/HR: 900; 150 INJECTION, SOLUTION INTRAVENOUS at 04:38

## 2018-10-20 RX ADMIN — AMPICILLIN SODIUM 2000 MG: 2 INJECTION, POWDER, FOR SOLUTION INTRAMUSCULAR; INTRAVENOUS at 17:54

## 2018-10-20 RX ADMIN — AMPICILLIN SODIUM 2000 MG: 2 INJECTION, POWDER, FOR SOLUTION INTRAMUSCULAR; INTRAVENOUS at 14:00

## 2018-10-20 RX ADMIN — ASPIRIN 81 MG: 81 TABLET, COATED ORAL at 09:01

## 2018-10-20 RX ADMIN — LOSARTAN POTASSIUM 100 MG: 50 TABLET, FILM COATED ORAL at 09:01

## 2018-10-20 RX ADMIN — CEFTRIAXONE SODIUM 2000 MG: 2 INJECTION, POWDER, FOR SOLUTION INTRAMUSCULAR; INTRAVENOUS at 13:25

## 2018-10-20 RX ADMIN — ACETAMINOPHEN 650 MG: 325 TABLET ORAL at 09:09

## 2018-10-20 RX ADMIN — CLOPIDOGREL BISULFATE 75 MG: 75 TABLET ORAL at 09:01

## 2018-10-20 RX ADMIN — ACYCLOVIR SODIUM 525 MG: 50 INJECTION, SOLUTION INTRAVENOUS at 15:00

## 2018-10-20 RX ADMIN — LORAZEPAM 1 MG: 2 INJECTION INTRAMUSCULAR; INTRAVENOUS at 13:49

## 2018-10-20 RX ADMIN — AMPICILLIN SODIUM 2000 MG: 2 INJECTION, POWDER, FOR SOLUTION INTRAMUSCULAR; INTRAVENOUS at 21:47

## 2018-10-20 RX ADMIN — HYDRALAZINE HYDROCHLORIDE 10 MG: 20 INJECTION INTRAMUSCULAR; INTRAVENOUS at 04:38

## 2018-10-20 RX ADMIN — DILTIAZEM HYDROCHLORIDE 240 MG: 240 CAPSULE, COATED, EXTENDED RELEASE ORAL at 09:01

## 2018-10-20 RX ADMIN — HEPARIN SODIUM 5000 UNITS: 5000 INJECTION INTRAVENOUS; SUBCUTANEOUS at 05:49

## 2018-10-20 RX ADMIN — ACYCLOVIR SODIUM 525 MG: 50 INJECTION, SOLUTION INTRAVENOUS at 22:50

## 2018-10-20 RX ADMIN — VANCOMYCIN HYDROCHLORIDE 750 MG: 1 INJECTION, POWDER, LYOPHILIZED, FOR SOLUTION INTRAVENOUS at 00:42

## 2018-10-20 RX ADMIN — SODIUM CHLORIDE 125 ML/HR: 0.9 INJECTION, SOLUTION INTRAVENOUS at 12:52

## 2018-10-20 RX ADMIN — THIAMINE HYDROCHLORIDE 100 MG: 100 INJECTION, SOLUTION INTRAMUSCULAR; INTRAVENOUS at 16:18

## 2018-10-20 RX ADMIN — POTASSIUM CHLORIDE 40 MEQ: 1500 TABLET, EXTENDED RELEASE ORAL at 09:02

## 2018-10-20 NOTE — UTILIZATION REVIEW
Initial Clinical Review  Admission: Date/Time/Statement: 10/19/18 @ 1305   Orders Placed This Encounter   Procedures    Inpatient Admission (expected length of stay for this patient is greater than two midnights)     Standing Status:   Standing     Number of Occurrences:   1     Order Specific Question:   Admitting Physician     Answer:   Joce Fuentes [[id number]]     Order Specific Question:   Level of Care     Answer:   Med Surg [16]     Order Specific Question:   Estimated length of stay     Answer:   More than 2 Midnights     Order Specific Question:   Certification     Answer:   I certify that inpatient services are medically necessary for this patient for a duration of greater than two midnights  See H&P and MD Progress Notes for additional information about the patient's course of treatment  ED: Date/Time/Mode of Arrival:   ED Arrival Information     Expected Arrival Acuity Means of Arrival Escorted By Service Admission Type    - 10/19/2018 11:15 Immediate Ambulance [facility] Medicine Emergency    Arrival Complaint    weakness      Chief Complaint:   Chief Complaint   Patient presents with    Altered Mental Status     pt last seen normal at 2100 lastnight  @ 10am today pt found to be confused with garbled speech  Pt able to say her name on arrival to ED  History of Illness:   Willis Mercer is a 68year old female who was brought to the emergency department by ambulance crew due to mental status changes which was not is by her daughter at about 10 o'clock this morning  Patient was noted to be normal yesterday  Onset of symptoms were unknown  On arrival in the emergency department, patient is warm to touch and he is having chills    ED Vital Signs:   ED Triage Vitals   Temperature Pulse Respirations Blood Pressure SpO2   10/19/18 1130 10/19/18 1130 10/19/18 1130 10/19/18 1130 10/19/18 1130   (!) 101 1 °F (38 4 °C) 76 22 (!) 189/85 98 %      Temp Source Heart Rate Source Patient Position - Orthostatic VS BP Location FiO2 (%)   10/19/18 1130 10/19/18 1348 10/19/18 1406 10/19/18 1347 --   Tympanic Monitor Lying Right arm       Pain Score       10/19/18 1138       3        Wt Readings from Last 1 Encounters:   10/19/18 86 2 kg (190 lb 1 oz)   Vital Signs (abnormal):   /79  Abnormal Labs/Diagnostic Test Results:   WBC 12 90 K 2 6 CL 97 CO2 32 GLUC 133 CA 11 ALK PHOS 33 LACTIC ACID 2 6  U/A+LEUK, GLUC  BLOOD  TROP 0 04, 0 05  CT HEAD=Normal study  CXR=Normal study  CT A/P=Artifact from the patient's arms  Imaging is degraded by patient motion,  with resultant artifact  The best possible images were obtained  Perinephric stranding is identified bilaterally, of unknown chronicity and  significance  There is no evidence of urolithiasis hydronephrosis or  hydroureter  The bowel is nonobstructed  A cyst identified right ovary, unexpected in a woman of 73 years  This is  indeterminate     ED Treatment:   Medication Administration from 10/19/2018 1115 to 10/19/2018 1404       Date/Time Order Dose Route Action Action by Comments     10/19/2018 1239 sodium chloride 0 9 % bolus 2,000 mL 0 mL Intravenous Stopped Emerson Abreu RN      10/19/2018 1154 sodium chloride 0 9 % bolus 2,000 mL 2,000 mL Intravenous [address] John Trinh, MARIA FERNANDA      10/19/2018 1149 ondansetron (ZOFRAN) injection 4 mg 4 mg Intravenous Given John Trinh RN      10/19/2018 1152 ketorolac (TORADOL) injection 15 mg 15 mg Intravenous Given Johnjo Trinh, MARIA FERNANDA      10/19/2018 1153 acetaminophen (TYLENOL) rectal suppository 650 mg 650 mg Rectal Given Johnjo Trinh, MARIA FERNANDA      10/19/2018 1250 vancomycin (VANCOCIN) IVPB (premix) 1,000 mg 1,000 mg Intravenous [address] Emerson Abreu RN      10/19/2018 1249 cefepime (MAXIPIME) IVPB (premix) 2,000 mg 0 mg Intravenous Stopped Emerson Abreu RN      10/19/2018 1153 cefepime (MAXIPIME) IVPB (premix) 2,000 mg 2,000 mg Intravenous New Bag John Trinh, RN 10/19/2018 1305 potassium chloride 20 mEq IVPB (premix) 20 mEq Intravenous New Bag Marquise Tristan RN       Past Medical/Surgical History: Active Ambulatory Problems     Diagnosis Date Noted    No Active Ambulatory Problems     Resolved Ambulatory Problems     Diagnosis Date Noted    No Resolved Ambulatory Problems     Past Medical History:   Diagnosis Date    [facility]     Cushing's disease ([facility]     Diabetes mellitus ([facility] )     Hypertension     Kidney stones     Migraine     Renal disorder     Rotator cuff injury 01/2017   Admitting Diagnosis: Hypokalemia [E87 6]  Altered mental state [R41 82]  Change in mental status [R41 82]  Sepsis ([facility] ) [A41 9]  Age/Sex: 68 y o  female  Assessment/Plan:   68 y o  female patient who originally presented to the hospital on 10/19/2018 due to fever and altered mental status  She was found at home by her daughters and found to be severely altered  On presentation to the emergency department, she was noted to be febrile, with leukocytosis, and elevated lactic acid  Patient was treated for severe sepsis with vancomycin and cefepime  Procalcitonin was checked on admission which was negative  In spite of antibiotics and normalization of her labs, she continued to be encephalopathic, with concern for possible viral encephalitis  It was felt that patient would benefit from a higher level of care for evaluation by Neurology, possibly a lumbar puncture, MRI, and Infectious Disease if necessary    TRANSFERRED TO [facility] FOR HIGHER LEVEL OF CARE 10/20/18  Admission Orders:  TELEMETRY  ACCUCHECKS WITH COVERAGE SCALE  VENODYNES  O2 TO KEEP SATS>92%  PT/OT EVAL & TX  Scheduled Meds:   Current Facility-Administered Medications:  acetaminophen 650 mg Oral Q6H PRN Dennis Cope MD    aspirin 81 mg Oral Daily Dennis Cope MD    atorvastatin 20 mg Oral Daily With Veronica Sarabia MD    cefepime 2,000 mg Intravenous Q12H Dennis Cope MD    clopidogrel 75 mg Oral Daily Sole Salcido MD    diltiazem 240 mg Oral Daily Sole Salcido MD    heparin (porcine) 5,000 Units Subcutaneous [facility] Sole Salcido MD    hydrALAZINE 10 mg Intravenous Q6H PRN Hunter Page PA-C    insulin lispro 1-5 Units Subcutaneous HS Sole Salcido MD    insulin lispro 1-6 Units Subcutaneous TID [facility] Sole Salcido MD    losartan 100 mg Oral Daily Sole Salcido MD    vancomycin 12 5 mg/kg (Adjusted) Intravenous Q12H Sole Salcido MD    Continuous Infusions:   sodium chloride 250 mL/hr Last Rate: 250 mL/hr (10/20/18 0403)   sodium chloride 0 9 % with KCl 20 mEq/L 125 mL/hr Last Rate: 125 mL/hr (10/20/18 0438)   PRN Meds:   acetaminophen    hydrALAZINE  Thank you,  [address] [place] [address] Utilization Review Department  Phone: [phone]; Fax [phone]  ATTENTION: Please call with any questions or concerns to [phone]  and carefully follow the prompts so that you are directed to the right person  Send all requests for admission clinical reviews, approved or denied determinations and any other requests to fax [phone]   All voicemails are confidential

## 2018-10-20 NOTE — CONSULTS
Vancomycin Monitoring    Demographics   Name Evie Higginbotham   Age / Height / BMI 72 / 5'3 / >30   Dosing Weight AjBW 66 8   Creatinine 0 89 adjusted 1 01   Vanco Days of Therapy 2   Goal Trough Level 15-20   Consult Prescriber Will   Additional Notes Transfer from [facility]           Assessment   Current Dose/Freq  750 q12h   Last Through Level N/a   Additional Notes Patient received doses 10-19 1300 and 10-20 0000 at [facility]           Plan   Dose Adjustments Continue 750 q12h   Next Trough Scheduled 10-21 1230 (prior to 5th dose)   Additional Notes              Pharmacy will continue to follow closely for s/sx of nephrotoxicity, infusion reactions and appropriateness of therapy as well as patient's culture results and clinical progress daily until medication is d/c'ed  Labs will be ordered if needed per protocol     Roel Torres, ShaynaD

## 2018-10-20 NOTE — ASSESSMENT & PLAN NOTE
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  She has been on mifepristone for the past month and the daughter noticed subtle changes in her memory and persistent headache since she has been on this medication  Rule out arrhythmia  Watch for seizure    The patient did not improved with cefepime and vancomycin  She remains encephalopathic requiring transfer from an outside hospital  Discussed with ID and Neurology  She will need a lumbar puncture which will be performed by Dr Alecia Mcdermott  Check MRI of the brain with attention to the temporal lobe  Start empiric acyclovir for HSV  She will need antibiotic coverage for listeria, probably ampicillin per ID    Await final blood cultures and urine cultures from outside hospital

## 2018-10-20 NOTE — OCCUPATIONAL THERAPY NOTE
Occupational Therapy Evaluation      Candido Joyner    10/20/2018    Patient Active Problem List   Diagnosis    [facility] ([facility] )    Hypertension    Hypokalemia    Toxic metabolic encephalopathy    [facility]       Past Medical History:   Diagnosis Date    A-fib ([facility] )     [facility]ushing's disease ([facility] )     Diabetes mellitus ([facility] )     Hypertension     Kidney stones     Migraine     Renal disorder     Rotator cuff injury 01/2017    bilat       Past Surgical History:   Procedure Laterality Date    HYSTERECTOMY          10/20/18 0846   Note Type   Note type Eval only   Pain Assessment   Pain Assessment 0-10   Pain Score (pt unable to report # at this time)   Pain Location Head  ("my head hurts")   Pain Descriptors Patient unable to describe   Pain Frequency (pt unable to report)   Pain Onset Unable to tell   Clinical Progression (unknown 2/2 pt cog status- unable to report)   Hospital Pain Intervention(s) Repositioned;Rest;Emotional support  (pt's RN made aware)   Home Living   Type of [address] Two level;Stairs to enter with rails  (LESA; 12-14 steps to 2nd level)   [address] (no AD at baseline per chart)   Additional Comments pt is poor historian 2/2 current cognitive status and no family at bedside at time of PT evaluation, PLOF and social hx obtained from pt's chart  PT to follow up w/ CM at later time if more information is obtained   Prior Function   Level of [place] Independent with ADLs and functional mobility   Lives With Daughter  Eugene Ennis)   ADL Assistance Independent   IADLs Independent   Falls in the last 6 months (unknown- none reported in pt's chart)   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)   Subjective   Subjective Pt speech very mumbled   Bed Mobility   Rolling R 3  Moderate assistance   Additional items Assist x 1;HOB elevated; Increased time required;Verbal cues;LE management   Supine to Sit 3  Moderate assistance   Additional items Assist x 1;HOB elevated; Bedrails; Increased time required;Verbal cues   Sit to Supine 3  Moderate assistance   Additional items Assist x 1; Increased time required;Verbal cues;LE management   Additional Comments log roll technique performed  Pt static sitting at EOB tolerance: 2-3 mins unsupported w/ B UE support  Pt favoring lateral leaning toward R side   Transfers   Sit to Stand Unable to assess   Stand to Sit Unable to assess   Balance   Static Sitting Fair -   Dynamic Sitting Poor +   Static Standing (DNT)   RUE Assessment   RUE Assessment X  (AAROM WFL)   RUE Strength   RUE Overall Strength Deficits;Due to cognitive deficits  (Pt unable to squeeze OT's fingers)   LUE Assessment   LUE Assessment X  (AAROM WFL)   LUE Strength   LUE Overall Strength Deficits;Due to cognitive deficits  (Pt unable to squeeze OT's fingers)   Cognition   Overall Cognitive Status Impaired   Orientation Level Oriented to person;Disoriented to place; Disoriented to time;Disoriented to situation  (responds to first name)   Memory Unable to assess   Following Commands Follows one step commands inconsistently   Comments poor insight, incoherent rambling speech w/ minimal coherent words  Pt reporting "I want excedrin", "I want to lay back down "   Assessment   Limitation Decreased ADL status; Decreased UE ROM; Decreased UE strength;Decreased Safe judgement during ADL;Decreased cognition;Decreased endurance;Decreased self-care trans;Decreased high-level ADLs   Prognosis Guarded   Assessment Pt is a 68 y o  female seen for OT evaluation s/p admit to [address] [place] [address] on 10/19/2018 w/ Severe sepsis ([facility] )  Comorbidities affecting pt's functional performance at time of assessment include: A-fib, Cushing's disease, DM, HTN, Kidney stones, Migraine, Renal disorder, Rotator cuff injury   Personal factors affecting pt at time of IE include:difficulty performing ADLS, difficulty performing IADLS  and decreased initiation and engagement   Prior to admission, pt was I with ADLs and IADLs  Upon evaluation: the following deficits impact occupational performance: decreased ROM, decreased strength, decreased balance, decreased tolerance, impaired arousal, impaired attention and impaired initiation  Pt to benefit from continued skilled OT tx while in the hospital to address deficits as defined above and maximize level of functional independence w ADL's and functional mobility  Occupational Performance areas to address include: eating, grooming, bathing/shower, toilet hygiene, dressing, functional mobility and clothing management  Recommendation unable to be determined at this time due to current medical and cognitive status  Goals   Patient Goals none expressed 2/2 current cognitive status   Plan   Treatment Interventions ADL retraining;Functional transfer training;UE strengthening/ROM; Endurance training;Cognitive reorientation;Patient/family training;Energy conservation   Goal Expiration Date 10/25/18   Treatment Day 0   OT Frequency 3-5x/wk   Barthel Index   Feeding 0   Bathing 0   Grooming Score 0   Dressing Score 0   Bladder Score 0   Bowels Score 0   Toilet Use Score 0   Transfers (Bed/Chair) Score 5   Mobility (Level Surface) Score 0   Stairs Score 0   Barthel Index Score 5       GOALS:    Pt will achieve the following within specified time frame: STG  Pt will achieve the following goals within 5 days    *ADL transfers with Max (A) for inc'd independence with ADLs/purposeful tasks    *Self Feeding- Mod (A) for inc'd independence with providing self nourishment    *UB ADL with Mod (A) for inc'd independence with self cares    *Participate in 10m UE therex to increase overall stamina/activity tolerance for purposeful tasks    *Bed mobility- Min (A) for inc'd independence to manage own comfort and initiate EOB & OOB purposeful tasks      Lidya Jin MS, OTR/L

## 2018-10-20 NOTE — PROGRESS NOTES
Patient ordered MRI today  MRI not accessible today unless ordered stat  Paged Neurology who stated it could wait until the morning

## 2018-10-20 NOTE — ASSESSMENT & PLAN NOTE
· Lactic acidosis, leukocytosis have all resolved, patient did receive vancomycin and cefepime per sepsis protocol  · Cultures are pending  · Procalcitonin on admission normal  · Unclear if this is bacterial or perhaps viral in nature  · Encephalopathy has not resolved, concerning for possible encephalitis  · Transfer to tertiary care facility for Neurology evaluation

## 2018-10-20 NOTE — PLAN OF CARE
CARDIOVASCULAR - ADULT     Maintains optimal cardiac output and hemodynamic stability Progressing     Absence of cardiac dysrhythmias or at baseline rhythm Progressing        DISCHARGE PLANNING     Discharge to home or other facility with appropriate resources Progressing        INFECTION - ADULT     Absence or prevention of progression during hospitalization Progressing     Absence of fever/infection during neutropenic period Progressing        Knowledge Deficit     Patient/family/caregiver demonstrates understanding of disease process, treatment plan, medications, and discharge instructions Progressing        MUSCULOSKELETAL - ADULT     Maintain or return mobility to safest level of function Progressing     Maintain proper alignment of affected body part Progressing        NEUROSENSORY - ADULT     Achieves stable or improved neurological status Progressing     Absence of seizures Progressing     Remains free of injury related to seizures activity Progressing     Achieves maximal functionality and self care Progressing        Potential for Falls     Patient will remain free of falls Progressing        Prexisting or High Potential for Compromised Skin Integrity     Skin integrity is maintained or improved Progressing        SAFETY ADULT     Maintain or return to baseline ADL function Progressing     Maintain or return mobility status to optimal level Progressing        SKIN/TISSUE INTEGRITY - ADULT     Skin integrity remains intact Progressing     Incision(s), wounds(s) or drain site(s) healing without S/S of infection Progressing     Oral mucous membranes remain intact Progressing

## 2018-10-20 NOTE — SOCIAL WORK
As per Dr Raza Marie, pt is being transferred to [facility] d/t requiring nuerology  Dr Radha Burns has accepted the pt

## 2018-10-20 NOTE — ASSESSMENT & PLAN NOTE
· Accelerated hypertension in the ER  · Continue home antihypertensive for now  · P r n   Hydralazine

## 2018-10-20 NOTE — PHYSICAL THERAPY NOTE
Physical Therapy Evaluation     Patient's Name: Xavier Pathak    Admitting Diagnosis  Hypokalemia [E87 6]  Altered mental state [R41 82]  Change in mental status [R41 82]  Sepsis ([facility] ) [A41 9]    Problem List  Patient Active Problem List   Diagnosis    [facility]    Hypertension    Hypokalemia    Toxic metabolic encephalopathy    [facility]'s disease [facility] )       Past Medical History  Past Medical History:   Diagnosis Date    [facility]fib ([facility] )     C[place]'s disease ([place] [address] )     Diabetes mellitus ([facility] )     Hypertension     Kidney stones     Migraine     Renal disorder     Rotator cuff injury 01/2017    bilat       Past Surgical History  Past Surgical History:   Procedure Laterality Date    HYSTERECTOMY          10/20/18 0845   Note Type   Note type Eval only   Pain Assessment   Pain Assessment 0-10   Pain Score (pt unable to report # at this time)   Pain Location Head  ("my head hurts")   Pain Descriptors Patient unable to describe   Pain Frequency (pt unable to report)   Pain Onset Unable to tell   Clinical Progression (unknown 2/2 pt cog status- unable to report)   Hospital Pain Intervention(s) Repositioned;Rest;Emotional support  (pt's RN made aware)   Home Living   Type of [address] Two level;Stairs to enter with rails  (LESA; 12-14 steps to 2nd level)   [address] (no AD at baseline per chart)   Additional Comments pt is poor historian 2/2 current cognitive status and no family at bedside at time of PT evaluation, PLOF and social hx obtained from pt's chart   PT to follow up w/ CM at later time if more information is obtained   Prior Function   Level of [place] Independent with ADLs and functional mobility   Lives With Daughter  Galen Givens)   ADL Assistance Independent   IADLs Independent   Falls in the last 6 months (unknown- none reported in pt's chart)   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)   Restrictions/Precautions Weight Bearing Precautions Per Order No   Braces or Orthoses (none per pt's chart)   Other Precautions Cognitive; Bed Alarm;Multiple lines;Telemetry;O2;Fall Risk;Pain   General   Family/Caregiver Present Yes  (2 family members initially, but left as soon as eval started)   Cognition   Overall Cognitive Status Impaired   Arousal/Participation Persistent stimuli required  (somnolent, rambling speech)   Orientation Level Oriented to person;Disoriented to place; Disoriented to time;Disoriented to situation  (responds to first name)   Memory Unable to assess   Following Commands Follows one step commands inconsistently   Comments poor insight, incoherent rambling speech w/ minimal coherent words  Pt reporting "I want excedrin", "I want to lay back down "   RUE Assessment   RUE Assessment (refer to OT eval for details)   LUE Assessment   LUE Assessment (refer to OT eval for details)   RLE Assessment   RLE Assessment X  (grossly observed at least 3/5 w/ mobility)   LLE Assessment   LLE Assessment X  (grossly observed at least 3/5 w/ mobility)   Coordination   Movements are Fluid and Coordinated 0   Sensation X  (unable to formally assess 2/2 current cog status)   Bed Mobility   Rolling R 3  Moderate assistance   Additional items Assist x 1;HOB elevated; Increased time required;Verbal cues;LE management   Supine to Sit 3  Moderate assistance   Additional items Assist x 1;HOB elevated; Bedrails; Increased time required;Verbal cues   Sit to Supine 3  Moderate assistance   Additional items Assist x 1; Increased time required;Verbal cues;LE management   Additional Comments log roll technique performed  Pt static sitting at EOB tolerance: 2-3 mins unsupported w/ B UE support  Pt favoring lateral leaning toward R side   Transfers   Sit to Stand Unable to assess   Stand to Sit Unable to assess   Ambulation/Elevation   Gait pattern Not tested; Not appropriate   Balance   Static Sitting Fair -   Dynamic Sitting Poor +   Static Standing (DNT)   Endurance Deficit   Endurance Deficit Yes   Activity Tolerance   Activity Tolerance Treatment limited secondary to medical complications (Comment)  (reduced cognitive status)   Medical Staff Made Aware pt w/ up w/ A order   Nurse Made Aware Yes, RN Alethea Ridley verbalized pt appropriate for PT session, made aware of outcomes/recs   Assessment   Prognosis Guarded   Problem List Decreased strength;Decreased endurance; Impaired balance;Decreased mobility; Decreased cognition;Decreased safety awareness;Pain   Assessment Pt is 68 y o  female seen for PT evaluation on 10/20/2018 s/p admit to [facility] on 10/19/2018 w/ Severe sepsis [facility]); pt presented to ED w/ AMS  PT consulted to assess pt's functional mobility and d/c needs  Order placed for PT eval and tx, w/ up w/ A order  Performed at least 2 patient identifiers during session: Name and wristband  Comorbidities affecting pt's physical performance at time of assessment include: toxic metabolic encephalopathy, AFib, Cushing's disease, DM, HTN  PTA, pt was independent w/ all functional mobility w/ no AD per chart, ambulates unrestricted distances and all terrain, has 3 LESA, lives 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  Personal factors affecting pt at time of IE include: lives in 2 story house, stairs to enter home, decreased cognition, decreased initiation and engagement, limited insight into impairments, inability to perform IADLs and inability to perform ADLs  Please find objective findings from PT assessment regarding body systems outlined above with impairments and limitations including weakness, impaired balance, decreased endurance, pain, decreased activity tolerance, fall risk and decreased cognition, as well as mobility assessment (need for moderate assist w/ all phases of mobility when usually ambulating independently)  The following objective measures performed on IE also reveal limitations: Barthel Index: 5/100   Pt's clinical presentation is currently unstable/unpredictable seen in pt's presentation of ongoing medical assessment  Pt to benefit from continued PT tx to address deficits as defined above and maximize level of functional independent mobility and consistency  From PT/mobility standpoint, recommendation at time of d/c would be TBD, pending progress in order to facilitate return to PLOF  Barriers to Discharge Inaccessible home environment   Goals   Patient Goals none expressed 2/2 current cognitive status   STG Expiration Date 10/30/18   Short Term Goal #1 In 7-10 days: Increase bilateral LE strength 1/2 grade to facilitate independent mobility, Perform all bed mobility tasks with min A of 1 to decrease caregiver burden, Increase static and dynamic sitting balance 1 grade to decrease risk for falls, Tolerate seated at EOB 15 minutes to facilitate functional task performance and PT to see and establish goals for functional transfers, standing balance, and mobility when appropriate   Treatment Day 0   Plan   Treatment/Interventions Functional transfer training;LE strengthening/ROM; Therapeutic exercise; Endurance training;Cognitive reorientation;Patient/family training;Equipment eval/education; Bed mobility;Spoke to nursing;OT   PT Frequency 5x/wk   Recommendation   Recommendation Defer at this time  (TBD pending pt progress and further mobility assessment)   Equipment Recommended (TBD)   Barthel Index   Feeding 0   Bathing 0   Grooming Score 0   Dressing Score 0   Bladder Score 0   Bowels Score 0   Toilet Use Score 0   Transfers (Bed/Chair) Score 5   Mobility (Level Surface) Score 0   Stairs Score 0   Barthel Index Score 5         Vanessa Mcdermott, PT

## 2018-10-20 NOTE — CONSULTS
Consultation - Infectious Disease   Mallory Garvey 68 y o  female MRN: [id number]  Unit/Bed#: [place] [address] -01 Encounter: [id number]      IMPRESSION & RECOMMENDATIONS:   Impression/Recommendations:    1  Severe sepsis, present on admission to [address] [place] [address] Ave [address] on 10/19  With reported fever of 104, leukocytosis, lactic acidosis  May be secondary to primary CNS process, as stated below  Bacteremia is also a possibility  Doubt UTI as the cause given severity of encephalopathy  Patient was started empirically on vancomycin and cefepime  Leukocytosis and lactic acidosis has improved  No fevers here  Fortunately, remains hemodynamically stable, nontoxic     -  Antibiotic plan as below  - monitor temperatures and hemodynamics  - follow up pending blood cultures  - check CBC in a m   -  Supportive care     2  Acute encephalopathy  Consideration for viral encephalitis based on patient's presentation  Cannot definitively rule out bacterial etiology as presentation was very acute in onset  CT head was negative  Lumbar puncture is pending  For now, would treat broadly for possible viral and bacterial CNS infection awaiting LP results  - start IV acyclovir 10 milligrams/kilogram q 8 hours  Recommend good IV hydration and close monitoring of renal function  - start IV ampicillin 2 g q 4 hours  - start IV ceftriaxone 2 g q 12 hours  - start IV vancomycin  Consult pharmacy for dosing recommendations with goal trough of 15-20   - followup lumbar puncture results  - check CSF glucose, protein, Gram stain, culture, comprehensive PCR, West Nile  - follow-up MRI brain     3  Gram-positive bacteremia  So far, 1 of 2 blood cultures drawn at [facility] is positive for gram-positive rods  Unclear significance of this  This may be a skin contaminant  However, may be real finding and related to cause of acute encephalopathy  Listeria is a consideration     -  IV ampicillin, as above  -  IV vancomycin, as above    - follow up blood culture data to guide further recommendations  4  Cushings disease  Patient was recently started on Korlym about 1 month ago  This is currently on hold  Antibiotics:    Vancomycin/cefepime    Discussed plan with Dr Sara Mayfield, Dr Geovani Romano, and with patient's daughter at bedside  Thank you for this consultation  We will follow along with you  HISTORY OF PRESENT ILLNESS:  Reason for Consult: Confusion, fevers    HPI: Carolina Darling is a 68 y o  female with history of diabetes, Cushing's disease recently started on Karina1 Herve Sheffield about 1 month ago who initially presented to [address] [place] [address] on 10/19/2018 after patient's daughter found her at home appearing very confused  Patient was talking to her    Patient's daughter called EMS and she was brought to the ED where she was found to be febrile with a temperature of 104°  Initial CT head was negative  CT abdomen showed perinephric stranding  Due to concern for UTI, patient was started on vancomycin and cefepime  However, her mental status continued to deteriorate patient is now poorly arousable and very confused  She was transferred to [address] [place] [address] for neurology evaluation and higher level of care  Today, patient is nonsensical and cannot answer questions herself  Her daughter at bedside states that she has been complaining of a right-sided headache for at least the past 2 weeks, which the patient felt was secondary to the new medication she started  At baseline, patient is highly functional and works in research  She lives in the [place] [address] so mosquito exposure is a possibility  No sick contacts  No prior infections  She lives with her daughter at home  REVIEW OF SYSTEMS:  A complete system-based review of systems is otherwise negative      PAST MEDICAL HISTORY:  Past Medical History:   Diagnosis Date    A-fib [facility])     [facility]'s [facility] )     Diabetes mellitus ([facility] )     Hypertension     Kidney stones     Migraine  Renal disorder     Rotator cuff injury 2017    bilat     Past Surgical History:   Procedure Laterality Date    HYSTERECTOMY         FAMILY HISTORY:  Non-contributory    SOCIAL HISTORY:  History   Alcohol Use No     History   Drug Use No     History   Smoking Status    Never Smoker   Smokeless Tobacco    Never Used       ALLERGIES:  No Known Allergies    MEDICATIONS:  All current active medications have been reviewed  PHYSICAL EXAM:  Vitals:  Temp:  [98 9 °F (37 2 °C)-99 9 °F (37 7 °C)] 98 9 °F (37 2 °C)  HR:  [60-78] 60  Resp:  [17-21] 20  BP: (148-207)/() 180/87  SpO2:  [91 %-98 %] 97 %  Temp (24hrs), Av 3 °F (37 4 °C), Min:98 9 °F (37 2 °C), Max:99 9 °F (37 7 °C)  Current: Temperature: 98 9 °F (37 2 °C)     Physical Exam:  General:  Arousable with painful stimuli, very confused  Eyes:  Conjunctive clear with no hemorrhages or effusions  Oropharynx:  No ulcers, no lesions  Neck:  Supple, no lymphadenopathy  Lungs:  Clear to auscultation bilaterally, no accessory muscle use  Cardiac:  Regular rate and rhythm, no murmurs  Abdomen:  Soft, non-tender, non-distended  Extremities:  No peripheral cyanosis, clubbing, or edema  Skin:  No rashes, no ulcers  Neurological:  Moves all four extremities spontaneously, very confused and disoriented      LABS, IMAGING, & OTHER STUDIES:  Lab Results:  I have personally reviewed pertinent labs      Results from last 7 days  Lab Units 10/20/18  0603 10/19/18  1830 10/19/18  1131   SODIUM mmol/L 139 139  139 140   POTASSIUM mmol/L 3 0* 3 0*  3 0* 2 6*   CHLORIDE mmol/L 103 100  101 97*   CO2 mmol/L 28 31  30 32*   BUN mg/dL 9 8  8 9   CREATININE mg/dL 0 89 0 83  0 83 0 88   EGFR ml/min/1 73sq m 65 71  71 66   CALCIUM mg/dL 9 7 9 6  9 7 11 0*   AST U/L 13  --  15   ALT U/L 11  --  11   ALK PHOS U/L 27*  --  33*       Results from last 7 days  Lab Units 10/20/18  0603 10/19/18  1830 10/19/18  1131   WBC Thousand/uL 9 70  --  12 90*   HEMOGLOBIN g/dL 11 3* --  12 9   PLATELETS Thousands/uL 172 188 227       Results from last 7 days  Lab Units 10/19/18  1131   GRAM STAIN RESULT  Gram positive rods         Imaging Studies:   I have personally reviewed pertinent imaging study reports and images in PACS  CT abdomen/pelvis shows perinephric stranding  No evidence of hydronephrosis     Chest x-ray was normal     CT head was normal    EKG, Pathology, and Other Studies:   I have personally reviewed pertinent reports

## 2018-10-20 NOTE — ASSESSMENT & PLAN NOTE
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

## 2018-10-20 NOTE — H&P
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  She has been on mifepristone for the past month and the daughter noticed subtle changes in her memory and persistent headache since she has been on this medication  Rule out arrhythmia  Watch for seizure    The patient did not improved with cefepime and vancomycin  She remains encephalopathic requiring transfer from an outside hospital  Discussed with ID and Neurology  She will need a lumbar puncture which will be performed by Dr Paula Rachel  Check MRI of the brain with attention to the temporal lobe  Start empiric acyclovir for HSV  She will need antibiotic coverage for listeria, probably ampicillin per ID  Await final blood cultures and urine cultures from outside hospital        Sepsis [facility])   Assessment & Plan    Secondary to HSV encephalitis versus gram-positive mendoza sepsis  Will start empiric acyclovir and order lumbar puncture and MRI of the brain  Will change antibiotics from cefepime/vanc to ampicillin/ceftriaxone/vancomycin     Hypertension   Assessment & Plan    Uncontrolled Secondary to acute illness  Add labetalol for systolic blood pressure greater than 180     Cushing's disease (HCC)   Assessment & Plan    Check a m   Cortisol  Hold mifepristone       Diabetes mellitus [facility])   Assessment & Plan    Lab Results   Component Value Date    HGBA1C 5 4 10/19/2018       Recent Labs      10/19/18   1134  10/19/18   1550  10/19/18   2031  10/20/18   1153   POCGLU  120  107  115  105       Blood Sugar Average: Last 72 hrs:  (P) 105     A1c at goal  Due to poor oral intake she is at risk for hypoglycemia  Serial Accu-Cheks  Hypoglycemia protocol  Hold oral hypoglycemic agent         VTE Prophylaxis: Hold for lumbar puncture  / sequential compression device and foot pump applied   Code Status:   Full code  POLST: There is no POLST form on file for this patient (pre-hospital)  Discussion with family:   Yes spoke with Carlos Aguilar her daughter    Anticipated Length of Stay:  Patient will be admitted on an Inpatient basis with an anticipated length of stay of   at least 2 midnights  Justification for Hospital Stay:   Encephalopathy    Total Time for Visit, including Counseling / Coordination of Care: 1 hour  Greater than 50% of this total time spent on direct patient counseling and coordination of care  Chief Complaint:     Confusion    History of Present Illness:    Mallory Garvey is a 68 y o  female who presents with hallucinations and bizarre behavior since 10/19/2018  She was at her usual health until the past month when she was started on a new medication: mifepristone  Since then her daughter noted that she has been more forgetful but was still functional   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  Aside from the occasional memory lapses she also complained of a headache on the right temporal area  Her mom would complain about this but it it seemed tolerable  She and her daughter had dinner on 10/18/2018  At that time she was noted to be normal without any confusion/fever/chills  Her daughter went to the patient's house on 10/19/2018 to obtain something she forgot, when she noted that her mom was screaming and hallucinating  She was calling out to her dead   She was also speaking to certain family members that were not there  She was definitely not herself and she was brought to [facility]  In the ER she met sepsis criteria with fever, tachycardia and suspected urinary tract infection    She was placed empirically on cefepime and vancomycin  She was observed there overnight without improvement in her mental status  She was transferred to our hospital for higher level of care  At the time of my examination the patient had incoherent speech and was not able to provide history  She did not follow commands  She was warm to touch  Most of the history was obtained from the records as well as her daughter  Her blood culture from the other hospital is growing 1/2 g positive rods    Review of Systems:    Review of Systems   Unable to perform ROS: Mental status change       Past Medical and Surgical History:     Past Medical History:   Diagnosis Date    A-fib ([facility] )     Cushing's disease ([facility] )     Diabetes mellitus ([facility] )     Hypertension     Kidney stones     Migraine     Renal disorder     Rotator cuff injury 01/2017    bilat       Past Surgical History:   Procedure Laterality Date    HYSTERECTOMY         Meds/Allergies:    Prior to Admission medications    Medication Sig Start Date End Date Taking? Authorizing Provider   aspirin (ECOTRIN LOW STRENGTH) 81 mg EC tablet Take 81 mg by mouth daily   Yes Historical Provider, MD   atorvastatin (LIPITOR) 20 mg tablet Take 20 mg by mouth daily   Yes Historical Provider, MD   clopidogrel (PLAVIX) 75 mg tablet Take 75 mg by mouth daily   Yes Historical Provider, MD   diltiazem (DILACOR XR) 240 MG 24 hr capsule Take 240 mg by mouth daily   Yes Historical Provider, MD   losartan (COZAAR) 100 MG tablet Take 100 mg by mouth daily   Yes Historical Provider, MD   metFORMIN (GLUMETZA) 1000 MG (MOD) 24 hr tablet Take 1,000 mg by mouth 2 (two) times a day with meals   Yes Historical Provider, MD   MiFEPRIStone (KORLYM) 300 MG TABS Take 300 mg by mouth daily   Yes Historical Provider, MD     I have reviewed home medications with a medical source (PCP, Pharmacy, other)      Allergies: No Known Allergies    Social History:     Marital Status: /Civil Union   Occupation:   Clinical researcher  Patient Pre-hospital Living Situation:   independent  Patient Pre-hospital Level of Mobility:   Independent  Patient Pre-hospital Diet Restrictions: Independent  Substance Use History:   History   Alcohol Use No     History   Smoking Status    Never Smoker   Smokeless Tobacco    Never Used     History   Drug Use No       Family History:    Family History   Problem Relation Age of Onset    Heart disease Mother        Physical Exam:     Vitals:   Blood Pressure: (!) 180/87 (10/20/18 1100)  Pulse: 60 (10/20/18 1100)  Temperature: 98 9 °F (37 2 °C) (10/20/18 1100)  Temp Source: Temporal (10/20/18 1100)  Respirations: 20 (10/20/18 1100)  SpO2: 97 % (10/20/18 1100)    Physical Exam   Constitutional: She appears lethargic  Obese   HENT:   Alopecia  No oral lesion   Eyes: No scleral icterus  Neck: No JVD present  Cardiovascular: Regular rhythm  Exam reveals no friction rub  No murmur heard  Pulmonary/Chest: Effort normal  No respiratory distress  She has no wheezes  She has no rales  Abdominal: Soft  She exhibits no distension  There is no tenderness  Musculoskeletal: She exhibits no edema  Neurological: She appears lethargic  GCS eye subscore is 4  GCS verbal subscore is 4  GCS motor subscore is 5  She displays no Babinski's sign on the right side  She displays no Babinski's sign on the left side  Garbled speech  Not following commands  Spontaneous motor movements  Unable to assess strength consistently due to inability to cooperate  Skin: Skin is warm and dry  No rash noted  Psychiatric:   Unable to assess     Additional Data:     Lab Results: I have personally reviewed pertinent reports          Results from last 7 days  Lab Units 10/20/18  0603   WBC Thousand/uL 9 70   HEMOGLOBIN g/dL 11 3*   HEMATOCRIT % 32 2*   PLATELETS Thousands/uL 172   NEUTROS ABS Thousands/µL 7 30*   NEUTROS PCT % 75   LYMPHS PCT % 17*   MONOS PCT % 7   EOS PCT % 0       Results from last 7 days  Lab Units 10/20/18  0603 SODIUM mmol/L 139   POTASSIUM mmol/L 3 0*   CHLORIDE mmol/L 103   CO2 mmol/L 28   BUN mg/dL 9   CREATININE mg/dL 0 89   ANION GAP mmol/L 8   CALCIUM mg/dL 9 7   ALBUMIN g/dL 4 0   TOTAL BILIRUBIN mg/dL 0 90   ALK PHOS U/L 27*   ALT U/L 11   AST U/L 13       Results from last 7 days  Lab Units 10/19/18  1131   INR  1 19       Results from last 7 days  Lab Units 10/20/18  1153 10/19/18  2031 10/19/18  1550 10/19/18  1134   POC GLUCOSE mg/dl 105 115 107 120       Results from last 7 days  Lab Units 10/19/18  1830   HEMOGLOBIN A1C % 5 4       Results from last 7 days  Lab Units 10/19/18  1830 10/19/18  1337 10/19/18  1131   LACTIC ACID mmol/L  --  1 5 2 6*   PROCALCITONIN ng/ml <0 05  --   --        Imaging: I have personally reviewed pertinent reports  MRI inpatient order    (Results Pending)       EKG, Pathology, and Other Studies Reviewed on Admission:   · EKG:   Sinus rhythm per outside records    [facility] / [facility] Records Reviewed: Yes     ** Please Note: This note has been constructed using a voice recognition system   **

## 2018-10-20 NOTE — ASSESSMENT & PLAN NOTE
· Unclear etiology, although possibly viral encephalitis  · CT head unremarkable  · No focal neuro deficits  · Transfer to tertiary care facility for Neurology evaluation, and possible other diagnostic studies including MRI and lumbar puncture  · Patient has had issues with hypercalcemia in the past, although her admission calcium was noted to be 11  ·  trend and monitor calcium

## 2018-10-20 NOTE — ASSESSMENT & PLAN NOTE
Lab Results   Component Value Date    HGBA1C 5 4 10/19/2018       Recent Labs      10/19/18   1134  10/19/18   1550  10/19/18   2031   POCGLU  120  107  115       Blood Sugar Average: Last 72 hrs:  (P) 114     · Hold metformin for now  · Sliding scale insulin

## 2018-10-20 NOTE — BRIEF OP NOTE (RAD/CATH)
Procedure note: Lumbar puncture  Indication:  Possible encephalitis  The patient was initially placed in the lateral recumbent position  L2-L3 interspace was palpated and sterilely prepped,  draped, and anesthetized with 1% lidocaine  A 20-gauge spinal needle was attempted to be inserted into the subarachnoid space without success  Subsequently, maintaining sterile field, patient was transitioned to the seated, forward flexed position  Subsequently, CSF was successfully obtained  Approximately 10-15 cc of clear spinal fluid was removed for analysis  The needle was withdrawn and a bandage was placed  The patient tolerated well  There were no complications      Andrzej Blue DO

## 2018-10-20 NOTE — EMTALA/ACUTE CARE TRANSFER
[address] [facility] SURGICAL [address]IT  [facility] [address] [id number]  [phone]  Dept: [phone]      ACUTE CARE TRANSFER CONSENT    NAME Billy QUINTERO 1945                              MRN [id number]    I have been informed of my rights regarding examination, treatment, and transfer   by Dr Rita Garces MD    Benefits:   specialty services including Interventional Radiology and Neurology    Risks:   worsening of condition, debility      Consent for Transfer:  I acknowledge that my medical condition has been evaluated and explained to me by the treating physician or other qualified medical person and/or my attending physician, who has recommended that I be transferred to the service of    at    The above potential benefits of such transfer, the potential risks associated with such transfer, and the probable risks of not being transferred have been explained to me, and I fully understand them  The doctor has explained that, in my case, the benefits of transfer outweigh the risks  I agree to be transferred  I authorize the performance of emergency medical procedures and treatments upon me in both transit and upon arrival at the receiving facility  Additionally, I authorize the release of any and all medical records to the receiving facility and request they be transported with me, if possible  I understand that the safest mode of transportation during a medical emergency is an ambulance and that the Hospital advocates the use of this mode of transport  Risks of traveling to the receiving facility by car, including absence of medical control, life sustaining equipment, such as oxygen, and medical personnel has been explained to me and I fully understand them  (JOLYNN CORRECT BOX BELOW)  [ x ]  I consent to the stated transfer and to be transported by ambulance/helicopter    [  ]  I consent to the stated transfer, but refuse transportation by ambulance and accept full responsibility for my transportation by car  I understand the risks of non-ambulance transfers and I exonerate the Hospital and its staff from any deterioration in my condition that results from this refusal     X___________________________________________    DATE  10/20/18  TIME________  Signature of patient or legally responsible individual signing on patient behalf           RELATIONSHIP TO PATIENT_________________________          Provider Certification    NAME Nayla Kent                                         1945                              MRN [id number]    A medical screening exam was performed on the above named patient  Based on the examination:    Condition Necessitating Transfer encephalopathy    Patient Condition:   stable    Reason for Transfer:   need for specialty services    Transfer Requirements: Facility     · Space available and qualified personnel available for treatment as acknowledged by   [facility] [place]  · Agreed to accept transfer and to provide appropriate medical treatment as acknowledged by Dr Helder Ochoa          · Appropriate medical records of the examination and treatment of the patient are provided at the time of transfer   [address] [facility], [address] _______  · Transfer will be performed by qualified personnel from    and appropriate transfer equipment as required, including the use of necessary and appropriate life support measures      Provider Certification: I have examined the patient and explained the following risks and benefits of being transferred/refusing transfer to the patient/family:         Based on these reasonable risks and benefits to the patient and/or the unborn child(renay), and based upon the information available at the time of the patients examination, I certify that the medical benefits reasonably to be expected from the provision of appropriate medical treatments at another [facility] facility outweigh the increasing risks, if any, to the individuals medical condition, and in the case of labor to the unborn child, from effecting the transfer      X____________________________________________ DATE 10/20/18        TIME_______      ORIGINAL - SEND TO MEDICAL RECORDS   COPY - SEND WITH PATIENT DURING TRANSFER

## 2018-10-20 NOTE — CONSULTS
Consultation - Neurology   [facility] 68 y o  female MRN: [id number]  Unit/Bed#: [facility] 87 227-01 Encounter: [id number]      Assessment/Plan   Assessment:  Patient exhibits mixed aphasia, with subtle additional evidence of left hemispheric dysfunction  In conjunction with her fever on presentation, concern would be for CNS infection (HSV or bacterial)  The lack of procalcitonin elevation is puzzling  It is possible that she simply suffered an embolic stroke, not yet visible on CT, and perhaps with fever and lactic acidosis, along with the observation of fecal incontinence are resultant from an associated nocturnal seizure  I do not think the timing quite fits to attribute this to strictly medication effect  Plan:  1  LP was discussed with her daughter and consent obtained, performed at the bedside  2  MRI brain  3  Empiric antimicrobial coverage, as prescribed by infectious disease consultant   4  Will follow along with you and advise further pending evolving data   5  Would not advocate empiric anticonvulsant treatment at this time, but that may be reconsidered pending her progress (certainly if she has any witnessed seizure occurrence)     Physician Requesting Consult: Andrew Perez MD  Reason for Consult / Principal Problem:  Encephalopathy, fever  Hx and PE limited by:  Patient unable to provide history due to aphasia  Daughter is at bedside to provide history  HPI: [facility] is a 68y o  year old female who presents with acute onset confusion, speech difficulty  Patient has past medical history outlined below, and was in her usual state of health on the evening of   The following morning she was due to fly out on a business trip  However, her daughter states October to see here in the morning, and heard the patient yelling for her  (who has been  for a year, then calling out for other family members    She went upstairs and found the patient lying in bed, awake, but confused  She had been incontinent of stool overnight  Patient fell when she knew sleep try to get out of bed, but with assistance was able to get to the bathroom  Thereafter EMS was summoned and she was brought to a local emergency room  She was noted to have a high fever, along with mild leukocytosis and a lactic acidosis  She was admitted with sepsis of unclear origin, begun on IV fluids and empiric antibiotics  Her lactic acidosis improved add leukocytosis result, but her confusion was persistent following day  She was subsequently transferred to [place] [id number] for neurological evaluation, and possible LP/MRI  Her daughter reports that for past couple complained of a temporal headache  The confusion and language difficulty though was is more abrupt in onset  There was no noted associated facial weakness or hemiparesis  She remains aphasic and somnolent  She was recently diagnosed with Cushing syndrome and started on mifepristone about 1 month ago by her endocrinologist for persistent elevated cortisol and hypercalcemia  Her daughter reports that there may have been some issue is with that medication including unsteadiness and shakiness  Patient also has history of atrial fibrillation, which was evidently being treated with rate control and combination aspirin and Plavix  A head CT in the outside ER was unremarkable       Inpatient consult to Neurology  Consult performed by: Nick Orantes ordered by: Jose Jules          Review of Systems   Reason unable to perform ROS: Patient is aphasic         Historical Information   Past Medical History:   Diagnosis Date    [facility]     [facility]ushing's [facility]     [facility]     Hypertension     Kidney stones     Migraine     Renal disorder     Rotator cuff injury 01/2017    bilat     Past Surgical History:   Procedure Laterality Date    HYSTERECTOMY       Social History   History   Alcohol Use No     History Drug Use No     History   Smoking Status    Never Smoker   Smokeless Tobacco    Never Used     Family History: non-contributory    Review of previous medical records was  completed  Meds/Allergies   all current active meds have been reviewed, current meds:   Current Facility-Administered Medications   Medication Dose Route Frequency    acetaminophen (TYLENOL) rectal suppository 325 mg  325 mg Rectal Q4H PRN    acyclovir (ZOVIRAX) 525 mg in sodium chloride 0 9 % 100 mL IVPB  10 mg/kg (Ideal) Intravenous Q8H    ampicillin (OMNIPEN) 2,000 mg in sodium chloride 0 9 % 100 mL IVPB  2,000 mg Intravenous Q4H    cefTRIAXone (ROCEPHIN) 2,000 mg in dextrose 5 % 50 mL IVPB  2,000 mg Intravenous Q12H    labetalol (NORMODYNE) injection 10 mg  10 mg Intravenous Q6H PRN    sodium chloride 0 9 % infusion  125 mL/hr Intravenous Continuous    thiamine (VITAMIN B1) 100 mg in sodium chloride 0 9 % 50 mL IVPB  100 mg Intravenous Daily    vancomycin (VANCOCIN) IVPB (premix) 750 mg  750 mg Intravenous Q12H [address]    and PTA meds:   Prior to Admission Medications   Prescriptions Last Dose Informant Patient Reported? Taking?    MiFEPRIStone (KORLYM) 300 MG TABS Past Week at Unknown time  Yes Yes   Sig: Take 300 mg by mouth daily   aspirin (ECOTRIN LOW STRENGTH) 81 mg EC tablet Past Week at Unknown time  Yes Yes   Sig: Take 81 mg by mouth daily   atorvastatin (LIPITOR) 20 mg tablet Past Week at Unknown time  Yes Yes   Sig: Take 20 mg by mouth daily   clopidogrel (PLAVIX) 75 mg tablet 10/20/2018 at Unknown time  Yes Yes   Sig: Take 75 mg by mouth daily   diltiazem (DILACOR XR) 240 MG 24 hr capsule 10/20/2018 at Unknown time  Yes Yes   Sig: Take 240 mg by mouth daily   losartan (COZAAR) 100 MG tablet 10/20/2018 at Unknown time  Yes Yes   Sig: Take 100 mg by mouth daily   metFORMIN (GLUMETZA) 1000 MG (MOD) 24 hr tablet 10/20/2018 at Unknown time  Yes Yes   Sig: Take 1,000 mg by mouth 2 (two) times a day with meals Facility-Administered Medications: None       No Known Allergies    Objective   Vitals:Blood pressure (!) 180/87, pulse 60, temperature 98 9 °F (37 2 °C), temperature source Temporal, resp  rate 20, SpO2 97 %  ,There is no height or weight on file to calculate BMI  No intake or output data in the 24 hours ending 10/20/18 1420    Invasive Devices: Invasive Devices     Peripheral Intravenous Line            Peripheral IV 10/19/18 Right Antecubital 1 day                Physical Exam   Constitutional: She appears well-developed and well-nourished  She appears distressed  HENT:   Head: Normocephalic and atraumatic  Mouth/Throat: No oropharyngeal exudate  Eyes: Conjunctivae are normal  No scleral icterus  Neck: Normal range of motion  Neck supple  Cardiovascular: Normal rate and regular rhythm  Pulmonary/Chest: Effort normal and breath sounds normal  No respiratory distress  Abdominal: Soft  Bowel sounds are normal  There is no tenderness  Musculoskeletal: She exhibits no edema or deformity  Lymphadenopathy:     She has no cervical adenopathy  Skin: Skin is warm and dry  She is not diaphoretic  Vitals reviewed  Neurologic Exam     Mental Status   Level of consciousness: drowsy  Patient is easily awakened  She is unable to state her name, or answer other orientation questions  She mostly says yes or other automatic phrases  She followed 1 command to raise her left leg, otherwise seem to have significant impaired comprehension  She was unable to name objects or read  She could not repeat  Cranial Nerves   Pupils are equal and reactive to light bilaterally  Her gaze is conjugate, without gaze preference  She will look toward the examiner easier on the left than the right  There is no facial asymmetry, and volitional movements appear symmetric  She reacts to pinprick both sides of the face  She appeared to blink to threat bilaterally  She would not protrude her tongue    She is able to turn her head from side to side     Motor Exam Unable to formally grade her power due to her aphasia inducing lack of full cooperation, but she has spontaneous movements of all 4 limbs and demonstrates at least 4/5 power  throughout bilaterally  She did  better with the left than the right  No involuntary movements or asymmetry to tone  Sensory Exam   Unreliable due to her aphasia  Gait, Coordination, and Reflexes Cannot cooperate for finger-to-nose or heel to shin testing, but no overt ataxia with spontaneous movements  Her deep tendon reflexes were grade 2 in the upper limbs and knees  Plantar responses were equivocal        Lab Results: I have personally reviewed pertinent reports  Imaging Studies: I have personally reviewed pertinent films in PACS  EKG, Pathology, and Other Studies: I have personally reviewed pertinent reports          Code Status: Level 1 - Full Code  Advance Directive and Living Will:      Power of :    POLST:

## 2018-10-20 NOTE — DISCHARGE SUMMARY
Discharge- [facility] 1945, 68 y o  female MRN: [id number]    Unit/Bed#: -01 Encounter: [id number]    Primary Care Provider: Yury Miranda   Date and time admitted to hospital: 10/19/2018 11:20 AM        * Severe sepsis (HCC)resolved as of 10/20/2018   Assessment & Plan    · Lactic acidosis, leukocytosis have all resolved, patient did receive vancomycin and cefepime per sepsis protocol  · Cultures are pending  · Procalcitonin on admission normal  · Unclear if this is bacterial or perhaps viral in nature  · Encephalopathy has not resolved, concerning for possible encephalitis  · Transfer to tertiary care facility for Neurology evaluation     Toxic metabolic encephalopathy   Assessment & Plan    · Unclear etiology, although possibly viral encephalitis  · CT head unremarkable  · No focal neuro deficits  · Transfer to tertiary care facility for Neurology evaluation, and possible other diagnostic studies including MRI and lumbar puncture  · Patient has had issues with hypercalcemia in the past, although her admission calcium was noted to be 11  ·  trend and monitor calcium     Hypokalemia   Assessment & Plan    · Improved with repletion  · Continue to replete     Diabetes mellitus [facility])   Assessment & Plan    Lab Results   Component Value Date    HGBA1C 5 4 10/19/2018       Recent Labs      10/19/18   1134  10/19/18   1550  10/19/18   2031   POCGLU  120  107  115       Blood Sugar Average: Last 72 hrs:  (P) 114     · Hold metformin for now  · Sliding scale insulin     Cushing's disease ([facility] [address] )   Assessment & Plan    · On korlym as outpatient     Hypertension   Assessment & Plan    · Accelerated hypertension in the ER  · Continue home antihypertensive for now  · P r n   Hydralazine         Discharging Physician / Practitioner: Marie Jennings MD  PCP: Yury Miranda  Admission Date:   Admission Orders     Ordered        10/19/18 1305  Inpatient Admission (expected length of stay for this patient is greater than two midnights)  Once             Discharge Date: 10/20/18    Disposition:      Inpatient [address] to [facility] SNF:   · n/a    Reason for Admission:   Fever and altered mental status    Discharge Diagnoses:     Please see assessment and plan section above for further details regarding discharge diagnoses  Resolved Problems  Date Reviewed: 10/20/2018          Resolved    * (Principal)Severe sepsis ([address] ) 10/20/2018     Resolved by  Stan Hernández MD          Consultations During Hospital Stay:  · None    Procedures Performed:   · None     Medication Adjustments and Discharge Medications:  · Summary of Medication Adjustments made as a result of this hospitalization:  On vancomycin and cefepime for severe sepsis  · Medication Dosing Tapers - Please refer to Discharge Medication List for details on any medication dosing tapers (if applicable to patient)  · Medications being temporarily held (include recommended restart time): n/a  · Discharge Medication List: See after visit summary for reconciled discharge medications  Wound Care Recommendations:  When applicable, please see wound care section of After Visit Summary  Diet Recommendations at Discharge:  Diet -        Diet Orders            Start     Ordered    10/19/18 1454  Diet Malcolm/CHO Controlled; Consistent Carbohydrate Diet Level 2 (5 carb servings/75 grams CHO/meal)  Diet effective now     Question Answer Comment   Diet Type Malcolm/CHO Controlled    Malcolm/CHO Controlled Consistent Carbohydrate Diet Level 2 (5 carb servings/75 grams CHO/meal)    RD to adjust diet per protocol? Yes        10/19/18 1453          Instructions for any Catheters / Lines Present at Discharge (including removal date, if applicable): n/a    Significant Findings / Test Results:     CT abdomen pelvis wo contrast   Final Result by Nicholas Hansen (10/19 1718)   Artifact from the patient's arms    Imaging is degraded by patient motion,   with resultant artifact  The best possible images were obtained  Perinephric stranding is identified bilaterally, of unknown chronicity and   significance  There is no evidence of urolithiasis hydronephrosis or   hydroureter  The bowel is nonobstructed  A cyst identified right ovary, unexpected in a woman of 73 years  This is   indeterminate                   Signed by Arleth Ann MD      XR chest 1 view   Final Result by Florencia Villeda MD (10/19 3616)   Normal study        Signed by Gudelia Zepeda MD      CT head without contrast   Final Result by Florencia Villeda MD (10/19 8273)   Normal study        Signed by Gudelia Zepeda MD          Incidental Findings:   · n/a     Test Results Pending at Discharge (will require follow up):   · cultures     Outpatient Tests Requested:  · n/a    Complications:    · n/a    Hospital Course:     Peter Chavez is a 68 y o  female patient who originally presented to the hospital on 10/19/2018 due to fever and altered mental status  She was found at home by her daughters and found to be severely altered  On presentation to the emergency department, she was noted to be febrile, with leukocytosis, and elevated lactic acid  Patient was treated for severe sepsis with vancomycin and cefepime  Procalcitonin was checked on admission which was negative  In spite of antibiotics and normalization of her labs, she continued to be encephalopathic, with concern for possible viral encephalitis  It was felt that patient would benefit from a higher level of care for evaluation by Neurology, possibly a lumbar puncture, MRI, and Infectious Disease if necessary  Condition at Discharge: stable     Discharge Day Visit / Exam:     Subjective:  Patient seen examined  Patient continues to be very confused    Vitals: Blood Pressure: (!) 179/84 (10/20/18 0720)  Pulse: 69 (10/20/18 0720)  Temperature: 99 6 °F (37 6 °C) (10/20/18 0720)  Temp Source: Temporal (10/20/18 0720)  Respirations: 17 (10/20/18 0720)  Height: 5' 3" (160 cm) (10/19/18 1415)  Weight - Scale: 86 2 kg (190 lb 1 oz) (10/19/18 1415)  SpO2: 95 % (10/20/18 0720)  Exam:   Physical Exam   Constitutional: She is oriented to person, place, and time  She appears well-developed and well-nourished  HENT:   Head: Normocephalic and atraumatic  Eyes: Pupils are equal, round, and reactive to light  EOM are normal    Neck: Normal range of motion  Neck supple  No JVD present  Cardiovascular: Normal rate, regular rhythm and normal heart sounds  Pulmonary/Chest: Effort normal and breath sounds normal    Abdominal: Soft  Bowel sounds are normal  She exhibits no distension  There is no tenderness  Obese   Musculoskeletal: Normal range of motion  She exhibits no edema  Neurological: She is alert and oriented to person, place, and time  No focal neuro deficits although continues to remain sedated   Skin: Skin is warm  No rash noted  No erythema  Psychiatric:   Unable to assess   Nursing note and vitals reviewed  Discussion with Family: daughters at bedside    Goals of Care Discussions:  · Code Status at Discharge: Level 1 - Full Code  · Were there any Goals of Care Discussions during Hospitalization?: Yes  · Results of any General Goals of Care Discussions: full code   · POLST Completed: No   · If POLST Completed, Summary of POLST Agreement Provided Here: n/a   · OK to Rehospitalize if Needed? Yes    Discharge instructions/Information to patient and family:   See after visit summary section titled Discharge Instructions for information provided to patient and family  Planned Readmission: admit to [facility]      Discharge Statement:  I spent 40 minutes discharging the patient  This time was spent on the day of discharge  I had direct contact with the patient on the day of discharge   Greater than 50% of the total time was spent examining patient, answering all patient questions, arranging and discussing plan of care with patient as well as directly providing post-discharge instructions  Additional time then spent on discharge activities      ** Please Note: This note has been constructed using a voice recognition system **

## 2018-10-20 NOTE — ASSESSMENT & PLAN NOTE
Lab Results   Component Value Date    HGBA1C 5 4 10/19/2018       Recent Labs      10/19/18   1134  10/19/18   1550  10/19/18   2031  10/20/18   1153   POCGLU  120  107  115  105       Blood Sugar Average: Last 72 hrs:  (P) 105     A1c at goal  Due to poor oral intake she is at risk for hypoglycemia  Serial Accu-Cheks  Hypoglycemia protocol  Hold oral hypoglycemic agent

## 2018-10-20 NOTE — LETTER
[address] [facility]  Dept: [phone]    October 28, 2018     Patient: Ang Hancock   YOB: 1945   Date of Visit: 10/20/2018       To Whom it May Concern:    Ang Hancock is under my professional care  She was seen in the hospital from 10/20/2018   to 10/28/18  She may return to work on the date she is cleared to go back to work from her family doctor  If you have any questions or concerns, please don't hesitate to call           Sincerely,          Esme Salazar, DO

## 2018-10-21 ENCOUNTER — APPOINTMENT (INPATIENT)
Dept: MRI IMAGING | Facility: HOSPITAL | Age: 73
DRG: 871 | End: 2018-10-21
Payer: COMMERCIAL

## 2018-10-21 LAB
ANION GAP SERPL CALCULATED.3IONS-SCNC: 7 MMOL/L (ref 4–13)
ATRIAL RATE: 74 BPM
BASOPHILS # BLD AUTO: 0.03 THOUSANDS/ΜL (ref 0–0.1)
BASOPHILS NFR BLD AUTO: 0 % (ref 0–1)
BUN SERPL-MCNC: 6 MG/DL (ref 5–25)
CALCIUM SERPL-MCNC: 9.5 MG/DL (ref 8.3–10.1)
CHLORIDE SERPL-SCNC: 105 MMOL/L (ref 100–108)
CO2 SERPL-SCNC: 30 MMOL/L (ref 21–32)
CORTIS SERPL-MCNC: 74.1 UG/DL
CREAT SERPL-MCNC: 0.82 MG/DL (ref 0.6–1.3)
EOSINOPHIL # BLD AUTO: 0.08 THOUSAND/ΜL (ref 0–0.61)
EOSINOPHIL NFR BLD AUTO: 1 % (ref 0–6)
ERYTHROCYTE [DISTWIDTH] IN BLOOD BY AUTOMATED COUNT: 12.3 % (ref 11.6–15.1)
GFR SERPL CREATININE-BSD FRML MDRD: 72 ML/MIN/1.73SQ M
GLUCOSE SERPL-MCNC: 100 MG/DL (ref 65–140)
GLUCOSE SERPL-MCNC: 113 MG/DL (ref 65–140)
GLUCOSE SERPL-MCNC: 123 MG/DL (ref 65–140)
GLUCOSE SERPL-MCNC: 132 MG/DL (ref 65–140)
GLUCOSE SERPL-MCNC: 94 MG/DL (ref 65–140)
HCT VFR BLD AUTO: 30.6 % (ref 34.8–46.1)
HGB BLD-MCNC: 10.5 G/DL (ref 11.5–15.4)
IMM GRANULOCYTES # BLD AUTO: 0.04 THOUSAND/UL (ref 0–0.2)
IMM GRANULOCYTES NFR BLD AUTO: 0 % (ref 0–2)
LYMPHOCYTES # BLD AUTO: 1.37 THOUSANDS/ΜL (ref 0.6–4.47)
LYMPHOCYTES NFR BLD AUTO: 13 % (ref 14–44)
MCH RBC QN AUTO: 30 PG (ref 26.8–34.3)
MCHC RBC AUTO-ENTMCNC: 34.3 G/DL (ref 31.4–37.4)
MCV RBC AUTO: 87 FL (ref 82–98)
MONOCYTES # BLD AUTO: 0.71 THOUSAND/ΜL (ref 0.17–1.22)
MONOCYTES NFR BLD AUTO: 7 % (ref 4–12)
NEUTROPHILS # BLD AUTO: 7.98 THOUSANDS/ΜL (ref 1.85–7.62)
NEUTS SEG NFR BLD AUTO: 79 % (ref 43–75)
NRBC BLD AUTO-RTO: 0 /100 WBCS
P AXIS: 59 DEGREES
PLATELET # BLD AUTO: 171 THOUSANDS/UL (ref 149–390)
PMV BLD AUTO: 11.2 FL (ref 8.9–12.7)
POTASSIUM SERPL-SCNC: 2.7 MMOL/L (ref 3.5–5.3)
PR INTERVAL: 172 MS
QRS AXIS: 32 DEGREES
QRSD INTERVAL: 94 MS
QT INTERVAL: 372 MS
QTC INTERVAL: 412 MS
RBC # BLD AUTO: 3.5 MILLION/UL (ref 3.81–5.12)
SODIUM SERPL-SCNC: 142 MMOL/L (ref 136–145)
T WAVE AXIS: 85 DEGREES
VANCOMYCIN TROUGH SERPL-MCNC: 9.7 UG/ML (ref 10–20)
VENTRICULAR RATE: 74 BPM
WBC # BLD AUTO: 10.21 THOUSAND/UL (ref 4.31–10.16)

## 2018-10-21 PROCEDURE — 99232 SBSQ HOSP IP/OBS MODERATE 35: CPT | Performed by: INTERNAL MEDICINE

## 2018-10-21 PROCEDURE — 99233 SBSQ HOSP IP/OBS HIGH 50: CPT | Performed by: INTERNAL MEDICINE

## 2018-10-21 PROCEDURE — A9585 GADOBUTROL INJECTION: HCPCS | Performed by: INTERNAL MEDICINE

## 2018-10-21 PROCEDURE — 85025 COMPLETE CBC W/AUTO DIFF WBC: CPT | Performed by: INTERNAL MEDICINE

## 2018-10-21 PROCEDURE — 99232 SBSQ HOSP IP/OBS MODERATE 35: CPT | Performed by: PSYCHIATRY & NEUROLOGY

## 2018-10-21 PROCEDURE — 70553 MRI BRAIN STEM W/O & W/DYE: CPT

## 2018-10-21 PROCEDURE — 80048 BASIC METABOLIC PNL TOTAL CA: CPT | Performed by: INTERNAL MEDICINE

## 2018-10-21 PROCEDURE — 82533 TOTAL CORTISOL: CPT | Performed by: INTERNAL MEDICINE

## 2018-10-21 PROCEDURE — 82948 REAGENT STRIP/BLOOD GLUCOSE: CPT

## 2018-10-21 PROCEDURE — 80202 ASSAY OF VANCOMYCIN: CPT | Performed by: INTERNAL MEDICINE

## 2018-10-21 PROCEDURE — 93010 ELECTROCARDIOGRAM REPORT: CPT | Performed by: INTERNAL MEDICINE

## 2018-10-21 RX ORDER — POTASSIUM CHLORIDE 14.9 MG/ML
20 INJECTION INTRAVENOUS ONCE
Status: COMPLETED | OUTPATIENT
Start: 2018-10-21 | End: 2018-10-21

## 2018-10-21 RX ORDER — DILTIAZEM HYDROCHLORIDE 240 MG/1
240 CAPSULE, COATED, EXTENDED RELEASE ORAL DAILY
Status: DISCONTINUED | OUTPATIENT
Start: 2018-10-21 | End: 2018-10-28 | Stop reason: HOSPADM

## 2018-10-21 RX ORDER — THIAMINE MONONITRATE (VIT B1) 100 MG
100 TABLET ORAL DAILY
Status: DISCONTINUED | OUTPATIENT
Start: 2018-10-21 | End: 2018-10-28 | Stop reason: HOSPADM

## 2018-10-21 RX ORDER — ACETAMINOPHEN 325 MG/1
650 TABLET ORAL EVERY 6 HOURS PRN
Status: DISCONTINUED | OUTPATIENT
Start: 2018-10-21 | End: 2018-10-28 | Stop reason: HOSPADM

## 2018-10-21 RX ORDER — SODIUM CHLORIDE AND POTASSIUM CHLORIDE .9; .15 G/100ML; G/100ML
100 SOLUTION INTRAVENOUS CONTINUOUS
Status: DISCONTINUED | OUTPATIENT
Start: 2018-10-21 | End: 2018-10-21

## 2018-10-21 RX ORDER — SODIUM CHLORIDE 9 MG/ML
75 INJECTION, SOLUTION INTRAVENOUS CONTINUOUS
Status: DISCONTINUED | OUTPATIENT
Start: 2018-10-21 | End: 2018-10-28 | Stop reason: HOSPADM

## 2018-10-21 RX ORDER — POTASSIUM CHLORIDE AND SODIUM CHLORIDE 900; 300 MG/100ML; MG/100ML
100 INJECTION, SOLUTION INTRAVENOUS CONTINUOUS
Status: DISCONTINUED | OUTPATIENT
Start: 2018-10-21 | End: 2018-10-21

## 2018-10-21 RX ORDER — POTASSIUM CHLORIDE 20MEQ/15ML
40 LIQUID (ML) ORAL ONCE
Status: COMPLETED | OUTPATIENT
Start: 2018-10-21 | End: 2018-10-21

## 2018-10-21 RX ORDER — DEXTROSE AND SODIUM CHLORIDE 5; .9 G/100ML; G/100ML
100 INJECTION, SOLUTION INTRAVENOUS CONTINUOUS
Status: DISCONTINUED | OUTPATIENT
Start: 2018-10-21 | End: 2018-10-21

## 2018-10-21 RX ORDER — ONDANSETRON 2 MG/ML
4 INJECTION INTRAMUSCULAR; INTRAVENOUS EVERY 4 HOURS PRN
Status: DISCONTINUED | OUTPATIENT
Start: 2018-10-21 | End: 2018-10-28 | Stop reason: HOSPADM

## 2018-10-21 RX ADMIN — VANCOMYCIN HYDROCHLORIDE 750 MG: 750 INJECTION, SOLUTION INTRAVENOUS at 13:08

## 2018-10-21 RX ADMIN — DEXTROSE AND SODIUM CHLORIDE 100 ML/HR: 5; .9 INJECTION, SOLUTION INTRAVENOUS at 08:26

## 2018-10-21 RX ADMIN — AMPICILLIN SODIUM 2000 MG: 2 INJECTION, POWDER, FOR SOLUTION INTRAMUSCULAR; INTRAVENOUS at 06:10

## 2018-10-21 RX ADMIN — CEFTRIAXONE SODIUM 2000 MG: 2 INJECTION, POWDER, FOR SOLUTION INTRAMUSCULAR; INTRAVENOUS at 13:09

## 2018-10-21 RX ADMIN — GADOBUTROL 9 ML: 604.72 INJECTION INTRAVENOUS at 12:51

## 2018-10-21 RX ADMIN — AMPICILLIN SODIUM 2000 MG: 2 INJECTION, POWDER, FOR SOLUTION INTRAMUSCULAR; INTRAVENOUS at 09:54

## 2018-10-21 RX ADMIN — ONDANSETRON 4 MG: 2 INJECTION INTRAMUSCULAR; INTRAVENOUS at 16:06

## 2018-10-21 RX ADMIN — ACYCLOVIR SODIUM 525 MG: 50 INJECTION, SOLUTION INTRAVENOUS at 21:42

## 2018-10-21 RX ADMIN — AMPICILLIN SODIUM 2000 MG: 2 INJECTION, POWDER, FOR SOLUTION INTRAMUSCULAR; INTRAVENOUS at 02:37

## 2018-10-21 RX ADMIN — SODIUM CHLORIDE 100 ML/HR: 0.9 INJECTION, SOLUTION INTRAVENOUS at 09:52

## 2018-10-21 RX ADMIN — POTASSIUM CHLORIDE 20 MEQ: 200 INJECTION, SOLUTION INTRAVENOUS at 08:46

## 2018-10-21 RX ADMIN — SODIUM CHLORIDE 125 ML/HR: 0.9 INJECTION, SOLUTION INTRAVENOUS at 03:44

## 2018-10-21 RX ADMIN — ACYCLOVIR SODIUM 525 MG: 50 INJECTION, SOLUTION INTRAVENOUS at 06:10

## 2018-10-21 RX ADMIN — ACYCLOVIR SODIUM 525 MG: 50 INJECTION, SOLUTION INTRAVENOUS at 14:47

## 2018-10-21 RX ADMIN — ENOXAPARIN SODIUM 40 MG: 40 INJECTION SUBCUTANEOUS at 13:09

## 2018-10-21 RX ADMIN — SODIUM CHLORIDE 100 ML/HR: 0.9 INJECTION, SOLUTION INTRAVENOUS at 22:40

## 2018-10-21 RX ADMIN — POTASSIUM CHLORIDE 40 MEQ: 20 SOLUTION ORAL at 06:40

## 2018-10-21 RX ADMIN — THIAMINE HYDROCHLORIDE 100 MG: 100 INJECTION, SOLUTION INTRAMUSCULAR; INTRAVENOUS at 08:22

## 2018-10-21 RX ADMIN — CEFTRIAXONE SODIUM 2000 MG: 2 INJECTION, POWDER, FOR SOLUTION INTRAMUSCULAR; INTRAVENOUS at 01:34

## 2018-10-21 RX ADMIN — ACETAMINOPHEN 650 MG: 325 TABLET, FILM COATED ORAL at 14:47

## 2018-10-21 RX ADMIN — DILTIAZEM HYDROCHLORIDE 240 MG: 240 CAPSULE, COATED, EXTENDED RELEASE ORAL at 09:52

## 2018-10-21 RX ADMIN — VANCOMYCIN HYDROCHLORIDE 750 MG: 750 INJECTION, SOLUTION INTRAVENOUS at 00:54

## 2018-10-21 RX ADMIN — POTASSIUM CHLORIDE 20 MEQ: 200 INJECTION, SOLUTION INTRAVENOUS at 06:53

## 2018-10-21 RX ADMIN — AMPICILLIN SODIUM 2000 MG: 2 INJECTION, POWDER, FOR SOLUTION INTRAMUSCULAR; INTRAVENOUS at 14:02

## 2018-10-21 NOTE — ASSESSMENT & PLAN NOTE
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

## 2018-10-21 NOTE — ASSESSMENT & PLAN NOTE
Secondary to HSV encephalitis versus gram-positive mendoza sepsis  Continue empiric acyclovir with normal saline for renal protection  Continue empiric ampicillin/vancomycin/ceftriaxone while awaiting final CSF studies

## 2018-10-21 NOTE — PROGRESS NOTES
Progress Note - Infectious Disease   Edyta Marcelo 68 y o  female MRN: [id number]  Unit/Bed#: [address] 227[address]01 Encounter: [id number]      Impression/Recommendations:    1  Severe sepsis, present on admission to [address] [place] [address] [address] [facility] on 10/19  With reported fever of 104, leukocytosis, lactic acidosis  May be secondary to primary CNS process, as stated below  Bacteremia is also a possibility, although less likely  Doubt UTI as the cause given severity of encephalopathy  Patient was started empirically on vancomycin and cefepime  Leukocytosis and lactic acidosis has improved  No fevers here  Fortunately, remains hemodynamically stable, nontoxic      -  Antibiotic plan as below  - monitor temperatures and hemodynamics  - check CBC in a m   -  Supportive care    2  Aseptic meningitis  Lumbar puncture revealed 62 WBCs  Glucose was 71, protein 72  Doubt bacterial meningitis based on patient's clinical presentation, LP findings  CSF gram stain and culture are negative  Consideration for viral meningitis/encephalitis  Also low suspicion for HSV encephalitis as MRI does not show any temporal lobe enhancement  We can also see CSF pleocytosis with seizures     -continue IV acyclovir for now pending HSV PCR  -hold additional antibiotics  -follow up CSF comprehensive PCR  -follow-up CSF for West Nile virus      3  Acute encephalopathy  Consideration for viral encephalitis based on patient's presentation  Cannot definitively rule out bacterial etiology as presentation was very acute in onset, although doubtful as stated above  CT head was negative  Also consideration for seizure activity  MRI with no focal findings      -follow-up neurology recommendations  -followup remainder of CSF studies  -serial exams      4  Gram-positive bacteremia  So far, 1 of 2 blood cultures drawn at [facility] now positive for lactococcus  This is likely a skin contaminant    The other blood culture remains negative      -continue to follow up blood culture data  -no additional antibiotic for this      5  Cushings disease  Patient was recently started on Korlym about 1 month ago  This is currently on hold      Antibiotics:    Vancomycin/ceftriaxone/ampicillin/acyclovir    Discussed with Dr Ghanshyam Kamara  Subjective:  Patient is much more awake today and conversant  However, her speech is still very slow and she is somewhat confused  Has not had any fevers  Blood pressures are stable  No tremors  Still continues to complain of frontal headache, which has been persistent for several weeks  No neck pain or stiffness  Objective:  Vitals:  Temp:  [97 6 °F (36 4 °C)-98 4 °F (36 9 °C)] 98 4 °F (36 9 °C)  HR:  [67-86] 69  Resp:  [18-20] 20  BP: (135-153)/(74-84) 153/78  SpO2:  [93 %-96 %] 96 %  Temp (24hrs), Av °F (36 7 °C), Min:97 6 °F (36 4 °C), Max:98 4 °F (36 9 °C)  Current: Temperature: 98 4 °F (36 9 °C)    Physical Exam:   General:  Well-nourished, well-developed, in no acute distress  Eyes:  Conjunctive clear with no hemorrhages or effusions  Oropharynx:  No ulcers, no lesions  Neck:  Supple, no lymphadenopathy  Lungs:  Clear to auscultation bilaterally, no accessory muscle use  Cardiac:  Regular rate and rhythm, no murmurs  Abdomen:  Soft, non-tender, non-distented  Extremities:  No peripheral cyanosis, clubbing, or edema  Skin:  No rashes, no ulcers  Neurological:  Moves all four extremities spontaneously, sensation grossly intact, no meningeal signs  Speech is slow    Lab Results:  I have personally reviewed pertinent labs      Results from last 7 days  Lab Units 10/21/18  0532 10/20/18  0603 10/19/18  1830 10/19/18  1131   SODIUM mmol/L 142 139 139  139 140   POTASSIUM mmol/L 2 7* 3 0* 3 0*  3 0* 2 6*   CHLORIDE mmol/L 105 103 100  101 97*   CO2 mmol/L 30 28 31  30 32*   BUN mg/dL 6 9 8  8 9   CREATININE mg/dL 0 82 0 89 0 83  0 83 0 88   EGFR ml/min/1 73sq m 72 65 71  71 66   CALCIUM mg/dL 9 5 9 7 9 6  9 7 11 0*   AST U/L  --  13  -- 15   ALT U/L  --  11  --  11   ALK PHOS U/L  --  27*  --  33*       Results from last 7 days  Lab Units 10/21/18  0532 10/20/18  0603 10/19/18  1830 10/19/18  1131   WBC Thousand/uL 10 21* 9 70  --  12 90*   HEMOGLOBIN g/dL 10 5* 11 3*  --  12 9   PLATELETS Thousands/uL 171 172 188 227       Results from last 7 days  Lab Units 10/20/18  1457 10/19/18  1202 10/19/18  1131   BLOOD CULTURE   --   --  Lactococcus garvieae*  No Growth at 24 hrs  GRAM STAIN RESULT  4+ Polys  2+ Mononuclear Cells  No bacteria seen  --  Gram positive cocci in chains  Gram negative rods  Gram positive cocci in clusters   URINE CULTURE   --  10,000-19,000 cfu/ml   --        Imaging Studies:   I have personally reviewed pertinent imaging study reports and images in PACS  MRI with no acute focal findings  EKG, Pathology, and Other Studies:   I have personally reviewed pertinent reports

## 2018-10-21 NOTE — PROGRESS NOTES
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 gram-positive mendoza sepsis  Continue empiric acyclovir with normal saline for renal protection  Continue empiric ampicillin/vancomycin/ceftriaxone while awaiting final CSF studies  * Acute encephalopathy   Assessment & Plan    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  Less likely from mifepristone, however the patient feels that since taking this medication she has not felt well  Will continue to hold mifepristone  Rule out arrhythmia  Watch for seizure    Clinically improved with empiric acyclovir and ceftriaxone/vancomycin/ampicillin combination   Changed thiamine to p o  Follow up final blood cultures, CSF studies  MRI pending  Advanced diet  Ambulate as tolerated     Hypertension   Assessment & Plan    Uncontrolled Secondary to acute illness  Add labetalol for systolic blood pressure greater than 180  Now that she is awake alert and able to eat, restart diltiazem  Hold ARB for now while on acyclovir     Cushing's disease ([facility] [address] )   Assessment & Plan    Hold mifepristone  AM cortisol Pending       Hypokalemia   Assessment & Plan    Received several doses of IV potassium today  This should improve with resumption of food intake  Recheck BMP in a m       Diabetes mellitus [facility])   Assessment & Plan    Lab Results   Component Value Date    HGBA1C 5 4 10/19/2018       Recent Labs      10/19/18   2031  10/20/18   1153  10/20/18   1615  10/21/18   0135   POCGLU  115  105  119  100       Blood Sugar Average: Last 72 hrs:  (P) 108     A1c at goal  Advance diet  Serial Accu-Cheks  Hypoglycemia protocol  Hold oral hypoglycemic agent       VTE Pharmacologic Prophylaxis:   Pharmacologic: Enoxaparin (Lovenox)  Mechanical VTE Prophylaxis in Place: Yes    Patient Centered Rounds: I have performed bedside rounds with nursing staff today  Education and Discussions with Family / Patient:  Spoke with daughter Jeb Ybarra and gave update    Time Spent for Care: 25 minutes  More than 50% of total time spent on counseling and coordination of care as described above  Current Length of Stay: 1 day(s)    Current Patient Status: Inpatient   Certification Statement: The patient will continue to require additional inpatient hospital stay due to Encephalitis versus sepsis    Discharge Plan: To be determined    Code Status: Level 1 - Full Code      Subjective:   Much improved mental status today  She is awake and alert with fluent speech  No fever overnight  She feels hungry  Objective:     Vitals:   Temp (24hrs), Av 2 °F (36 8 °C), Min:97 6 °F (36 4 °C), Max:98 9 °F (37 2 °C)    Temp:  [97 6 °F (36 4 °C)-98 9 °F (37 2 °C)] 97 6 °F (36 4 °C)  HR:  [60-86] 67  Resp:  [18-20] 18  BP: (135-180)/(74-87) 153/77  SpO2:  [93 %-97 %] 96 %  There is no height or weight on file to calculate BMI  Input and Output Summary (last 24 hours): Intake/Output Summary (Last 24 hours) at 10/21/18 0932  Last data filed at 10/21/18 1739   Gross per 24 hour   Intake             1500 ml   Output             1222 ml   Net              278 ml       Physical Exam:     Physical Exam   Constitutional:   Obese   HENT:   Alopecia   Eyes: Conjunctivae and EOM are normal  No scleral icterus  Neck: Neck supple  No JVD present  Cardiovascular: Regular rhythm  No murmur heard  Pulmonary/Chest: Effort normal  No respiratory distress  She has no wheezes  Abdominal: Soft  She exhibits no distension  There is no tenderness  Musculoskeletal: She exhibits no edema  Neurological: She is alert  Skin: Skin is warm and dry  Psychiatric: She has a normal mood and affect  Her behavior is normal        Additional Data:     Labs:      Results from last 7 days  Lab Units 10/21/18  0532   WBC Thousand/uL 10 21*   HEMOGLOBIN g/dL 10 5*   HEMATOCRIT % 30 6*   PLATELETS Thousands/uL 171   NEUTROS PCT % 79*   LYMPHS PCT % 13*   MONOS PCT % 7   EOS PCT % 1       Results from last 7 days  Lab Units 10/21/18  0532 10/20/18  0603   SODIUM mmol/L 142 139   POTASSIUM mmol/L 2 7* 3 0*   CHLORIDE mmol/L 105 103   CO2 mmol/L 30 28   BUN mg/dL 6 9   CREATININE mg/dL 0 82 0 89   ANION GAP mmol/L 7 8   CALCIUM mg/dL 9 5 9 7   ALBUMIN g/dL  --  4 0   TOTAL BILIRUBIN mg/dL  --  0 90   ALK PHOS U/L  --  27*   ALT U/L  --  11   AST U/L  --  13       Results from last 7 days  Lab Units 10/19/18  1131   INR  1 19       Results from last 7 days  Lab Units 10/21/18  0135 10/20/18  1615 10/20/18  1153 10/19/18  2031 10/19/18  1550 10/19/18  1134   POC GLUCOSE mg/dl 100 119 105 115 107 120       Results from last 7 days  Lab Units 10/19/18  1830   HEMOGLOBIN A1C % 5 4       Results from last 7 days  Lab Units 10/20/18  0603 10/19/18  1830 10/19/18  1337 10/19/18  1131   LACTIC ACID mmol/L  --   --  1 5 2 6*   PROCALCITONIN ng/ml <0 05 <0 05  --   --            * I Have Reviewed All Lab Data Listed Above  * Additional Pertinent Lab Tests Reviewed: All Labs Within Last 24 Hours Reviewed    Imaging:    Imaging Reports Reviewed Today Include:  None      Recent Cultures (last 7 days):       Results from last 7 days  Lab Units 10/20/18  1457 10/19/18  1202 10/19/18  1131   BLOOD CULTURE   --   --  No Growth at 24 hrs     GRAM STAIN RESULT  4+ Polys  2+ Mononuclear Cells  No bacteria seen  --  Gram positive cocci in chains  Gram negative rods  Gram positive cocci in clusters   URINE CULTURE   --  10,000-19,000 cfu/ml   --        Last 24 Hours Medication List:     Current Facility-Administered Medications:  acetaminophen 325 mg Rectal Q4H PRN Hemal Galvez MD acyclovir 10 mg/kg (Ideal) Intravenous Q8H Dayna Aldea, DO Last Rate: 525 mg (10/21/18 0610)   ampicillin 2,000 mg Intravenous Q4H Dayna Aldea, DO Last Rate: 2,000 mg (10/21/18 0610)   cefTRIAXone 2,000 mg Intravenous Q12H Dayna Aldea, DO Last Rate: 2,000 mg (10/21/18 0134)   enoxaparin 40 mg Subcutaneous Q24H [facility] Renay Paula MD    labetalol 10 mg Intravenous Q6H PRN Renay Paula MD    potassium chloride 20 mEq Intravenous Once Montana Kauffman, CRNP Last Rate: 20 mEq (10/21/18 0846)   sodium chloride 100 mL/hr Intravenous Continuous Renay Paula MD    thiamine 100 mg Oral Daily Renay Paula MD    vancomycin 750 mg Intravenous Q12H [facility] Dayna Aldea, DO Last Rate: 750 mg (10/21/18 0054)        Today, Patient Was Seen By: Renay Paula MD    ** Please Note: Dictation voice to text software may have been used in the creation of this document   **

## 2018-10-21 NOTE — ASSESSMENT & PLAN NOTE
Received several doses of IV potassium today  This should improve with resumption of food intake  Recheck BMP in a m

## 2018-10-21 NOTE — PROGRESS NOTES
Progress Note - Neurology   Marisol Higginbotham 68 y o  female [id number]  Unit/Bed#: [facility] 2 /[facility] 2 M*    Assessment:  Lucy Oconnell is a 68 y o  female with a history of Afib on Aspirin and Plavix, HTN, DM2, Cushing's disease on Mifepristone who presented to [facility] on 10/19 with fever of 104, leukocytosis, lactic acidosis, acute onset confusion and aphasia  Patient was transferred to [facility] and started on broad-spectrum a empiric antibiotics and acyclovir  LP was completed on 10/20  Initial studies revealing a moderately elevated WBC  Additional CSF studies pending  Imaging has been unremarkable  Patient has had significant clinical improvement and continues on acyclovir  Plan:  Acute encephalopathy, Aphasia  Patient's exam much improved from prior exam   Patient alert and fully oriented with only minimal difficulties with speech  Etiology still unclear; however, it does not appear to be bacterial in nature based on her initial CSF studies  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, especially as patient has contineed to improve while on Acyclovir  Even though the MRI does not reveal any of the typical temporal enhancement often seen with HSV, this does not disprove this diagnosis especially as the patient was treated so quickly  Additionally, for the same reason, even if the Comprehensive PCR does not confirm HSV encephalitis, this too can be a false negative  For this reason and due to the severity of the patient's symptoms, we feel it would be prudent for the patient to be treated with a full course of antivirals regardless of the CSF PCR results      Patient continues to be afebrile  White count 10 21  LP completed 10/20 with resulted CSF studies as follows:    - WBC 62 (88% neutrophils, 11% monos, 1% lymphs), glucose 71, protein 72, gram stain negative  Pending CSF studies as follows:   - comprehensive PCR, West Nile virus, CSF culture  ID following  Patient currently on Acyclovir  CT head negative for acute abnormality  MRI negative for acute abnormality  Mild chronic microangiopathy noted  CT abdomen and pelvis revealing perinephric stranding though no evidence of hydronephrosis or hydroureter  Additionally a right ovarian cyst is noted  Subjective:   Patient afebrile  WBC 10 21  Today on exam, the patient states she is much improved  She does admit to intermittent nausea, as well as bifrontal headache with radiation to the to the right temporal region and ear  Today the patient alert and is oriented fully which is much different than her prior exam   She is able to answer questions  The patient's 2 daughters as well as several friends are at bedside  The patient's daughter who found her mom altered, was present during our exam   She again recounted the event stating that upon her arrival she called her mom's name in heard no response  She then yelled her mom's name and heard noises coming from her mom's bedroom  When she arrived the patient was supine in bed  She was noted to be tremulous and talking gibberish " She went to get her phone to call EMS and when she returned the patient was on the floor  She also had noticed that there was stool on top of the toilet  Her mom was unable to follow directions  When EMS came they did check her blood sugar which the patient's daughter stated was fine        Past Medical History:   Diagnosis Date    [facility]     Cushing's disease [facility]     Diabetes mellitus [facility] [facility]     Hypertension     Kidney stones     Migraine     Renal disorder     Rotator cuff injury 01/2017    bilat     Past Surgical History:   Procedure Laterality Date    HYSTERECTOMY       Family history:  Family History   Problem Relation Age of Onset    Heart disease Mother        Social History     Social History    Marital status: /Civil Union     Spouse name: N/A    Number of children: N/A    Years of education: N/A     Social History Main Topics    Smoking status: Never Smoker    Smokeless tobacco: Never Used    Alcohol use No    Drug use: No    Sexual activity: Not Asked     Other Topics Concern    None     Social History Narrative    None       Scheduled Meds:  Current Facility-Administered Medications:  acetaminophen 650 mg Oral Q6H PRN Dwayne Do MD    acyclovir 10 mg/kg (Ideal) Intravenous Q8H Dayna Aldea, DO Last Rate: 525 mg (10/21/18 1447)   diltiazem 240 mg Oral Daily Dwayne Do MD    enoxaparin 40 mg Subcutaneous Q24H [address] Dwayne Do MD    insulin lispro 1-5 Units Subcutaneous TID [facility] Dwayne Do MD    labetalol 10 mg Intravenous Q6H PRN Dwayne Do MD    sodium chloride 100 mL/hr Intravenous Continuous Dwayne Do MD Last Rate: 100 mL/hr (10/21/18 9319)   thiamine 100 mg Oral Daily Dwayne Do MD      Continuous Infusions:  sodium chloride 100 mL/hr Last Rate: 100 mL/hr (10/21/18 0924)     PRN Meds:   acetaminophen    labetalol    ROS: A 12 point ROS was completed and other than the above mentioned complaints in the HPI and those listed below, all remaining systems were negative  - significant nausea  - poor hearing, which has been getting worse over the past 6 months or so    Vitals: /78 (BP Location: Left arm)   Pulse 69   Temp 98 4 °F (36 9 °C) (Temporal)   Resp 20   SpO2 96%     Physical Exam:   Physical Exam   Constitutional: She is oriented to person, place, and time  She appears well-developed and well-nourished  No distress  Supine in bed   HENT:   Head: Normocephalic and atraumatic  Eyes: Pupils are equal, round, and reactive to light  Conjunctivae and EOM are normal  Right eye exhibits no discharge  Left eye exhibits no discharge  No scleral icterus  Neck: Normal range of motion  Neck supple  Cardiovascular: Normal rate and regular rhythm  Exam reveals no gallop and no friction rub  No murmur heard  Pulmonary/Chest: Effort normal and breath sounds normal  No stridor  No respiratory distress   She has no wheezes  She has no rales  She exhibits no tenderness  Musculoskeletal: Normal range of motion  She exhibits no edema, tenderness or deformity  Lymphadenopathy:     She has no cervical adenopathy  Neurological: She is alert and oriented to person, place, and time  She has normal strength  Finger-nose-finger test: No elliott dysmetria or ataxia though patient did have difficulty following this command and required multiple verbal cues  Skin: Skin is warm and dry  No rash noted  No erythema  Psychiatric: She has a normal mood and affect  Her behavior is normal  Judgment and thought content normal    pleasant     Neurologic Exam     Mental Status   Oriented to person, place, and time  Patient is alert and fully oriented  She is able to identify people in the room  She is able to recall the birth dates of her daughters  She is able to follow one-step commands however has difficulty with multi step directions, as highlighted when trying to evaluate finger to nose  She maintains attention throughout exam   No evidence of dysarthria  Patient able to name all objects on NIHSS stroke cards correct except [place] for which she called it another made up word starting with an H      Cranial Nerves     CN II   Visual fields full to confrontation  Visual acuity: normal (Grossly)    CN III, IV, VI   Pupils are equal, round, and reactive to light  Extraocular motions are normal    Conjugate gaze: present    CN V   Facial sensation intact  CN VII   Facial expression full, symmetric  CN VIII   Hearing: impaired    CN XII   CN XII normal      Motor Exam   Muscle bulk: normal  Overall muscle tone: normal    Strength   Strength 5/5 throughout  Sensory Exam   Light touch normal      Gait, Coordination, and Reflexes     Coordination   Finger-nose-finger test: No elliott dysmetria or ataxia though patient did have difficulty following this command and required multiple verbal cues        Labs:  Recent Results (from the past 24 hour(s))   Fingerstick Glucose (POCT)    Collection Time: 10/20/18  4:15 PM   Result Value Ref Range    POC Glucose 119 65 - 140 mg/dl   Fingerstick Glucose (POCT)    Collection Time: 10/21/18  1:35 AM   Result Value Ref Range    POC Glucose 100 65 - 140 mg/dl   Basic metabolic panel    Collection Time: 10/21/18  5:32 AM   Result Value Ref Range    Sodium 142 136 - 145 mmol/L    Potassium 2 7 (LL) 3 5 - 5 3 mmol/L    Chloride 105 100 - 108 mmol/L    CO2 30 21 - 32 mmol/L    ANION GAP 7 4 - 13 mmol/L    BUN 6 5 - 25 mg/dL    Creatinine 0 82 0 60 - 1 30 mg/dL    Glucose 113 65 - 140 mg/dL    Calcium 9 5 8 3 - 10 1 mg/dL    eGFR 72 ml/min/1 73sq m   CBC and differential    Collection Time: 10/21/18  5:32 AM   Result Value Ref Range    WBC 10 21 (H) 4 31 - 10 16 Thousand/uL    RBC 3 50 (L) 3 81 - 5 12 Million/uL    Hemoglobin 10 5 (L) 11 5 - 15 4 g/dL    Hematocrit 30 6 (L) 34 8 - 46 1 %    MCV 87 82 - 98 fL    MCH 30 0 26 8 - 34 3 pg    MCHC 34 3 31 4 - 37 4 g/dL    RDW 12 3 11 6 - 15 1 %    MPV 11 2 8 9 - 12 7 fL    Platelets 225 373 - 326 Thousands/uL    nRBC 0 /100 WBCs    Neutrophils Relative 79 (H) 43 - 75 %    Immat GRANS % 0 0 - 2 %    Lymphocytes Relative 13 (L) 14 - 44 %    Monocytes Relative 7 4 - 12 %    Eosinophils Relative 1 0 - 6 %    Basophils Relative 0 0 - 1 %    Neutrophils Absolute 7 98 (H) 1 85 - 7 62 Thousands/µL    Immature Grans Absolute 0 04 0 00 - 0 20 Thousand/uL    Lymphocytes Absolute 1 37 0 60 - 4 47 Thousands/µL    Monocytes Absolute 0 71 0 17 - 1 22 Thousand/µL    Eosinophils Absolute 0 08 0 00 - 0 61 Thousand/µL    Basophils Absolute 0 03 0 00 - 0 10 Thousands/µL   Cortisol    Collection Time: 10/21/18  5:32 AM   Result Value Ref Range    Cortisol, Random 74 1 ug/dL   Fingerstick Glucose (POCT)    Collection Time: 10/21/18 11:06 AM   Result Value Ref Range    POC Glucose 132 65 - 140 mg/dl   Vancomycin, trough Please contact pharmacy with results (consult) - draw peripherally    Collection Time: 10/21/18 12:02 PM   Result Value Ref Range    Vancomycin Tr 9 7 (L) 10 0 - 20 0 ug/mL     Imaging: I have personally reviewed pertinent imaging and PACS reports       VTE Prophylaxis: Enoxaparin (Lovenox)

## 2018-10-21 NOTE — ASSESSMENT & PLAN NOTE
Lab Results   Component Value Date    HGBA1C 5 4 10/19/2018       Recent Labs      10/19/18   2031  10/20/18   1153  10/20/18   1615  10/21/18   0135   POCGLU  115  105  119  100       Blood Sugar Average: Last 72 hrs:  (P) 108     A1c at goal  Advance diet  Serial Accu-Cheks  Hypoglycemia protocol  Hold oral hypoglycemic agent

## 2018-10-21 NOTE — ASSESSMENT & PLAN NOTE
Uncontrolled Secondary to acute illness  Add labetalol for systolic blood pressure greater than 180  Now that she is awake alert and able to eat, restart diltiazem    Hold ARB for now while on acyclovir

## 2018-10-21 NOTE — UTILIZATION REVIEW
Thank you,  [address]  Utilization Review Department  Phone: [phone]; Fax [phone]  ATTENTION: Please call with any questions or concerns to [phone]  and carefully follow the prompts so that you are directed to the right person  Send all requests for admission clinical reviews, approved or denied determinations and any other requests to fax [phone]  All voicemails are confidential      Initial Clinical Review    Admission: Date/Time/Statement: inpatient 10/20/18 @ [facility] LEVEL OF CARE DUE TO SEPSIS/UTI  Orders Placed This Encounter   Procedures    Inpatient Admission     Standing Status:   Standing     Number of Occurrences:   1     Order Specific Question:   Admitting Physician     Answer:   Mango Zuniga [[id number]]     Order Specific Question:   Level of Care     Answer:   Med Surg [[id number]]     Order Specific Question:   Bed Type     Answer:   Florentino [[id number]]     Order Specific Question:   Estimated length of stay     Answer:   More than 2 Midnights     Order Specific Question:   Certification     Answer:   I certify that inpatient services are medically necessary for this patient for a duration of greater than two midnights  See H&P and MD Progress Notes for additional information about the patient's course of treatment  History of Illness: 67 yo fem to ED from [facility] due to admit needed for higher level of care due to sepsis and UTI  Has  hallucinations and bizarre behavior since 10/19/18  In usual state of health until the past month-started new med-mifepristone, more forgetful since then but still functioning  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  C/o R temporal headache with memory lapses   On 10/18, dtr had dinner with pt then on 10/19, patient was screaming and hallucinating, calling out to dead  and speaking to people who weren't there   On arrival, incoherent speech, unable to provide history, not following commands  bld c&s done at [facility] growing 1/2 gm pos rods  Temperature Pulse Respirations Blood Pressure SpO2   10/20/18 1100 10/20/18 1100 10/20/18 1100 10/20/18 1100 10/20/18 1100   98 9 °F (37 2 °C) 60 20 (!) 180/87 97 %      Temp Source Heart Rate Source Patient Position - Orthostatic VS BP Location FiO2 (%)   10/20/18 1100 10/20/18 1100 10/20/18 1100 10/20/18 1100 --   Temporal Monitor Lying Right arm       Pain Score       10/20/18 1116       2        Wt Readings from Last 1 Encounters:   10/20/18 88 5 kg (195 lb)     Abnormal Labs/Diagnostic Test Results:   k 2 6, 3, 3, 2 7    Cl 97    Gluc 133, 123, 126    Ca 11  Alk phos 33, 27  mb 1 3   Ammonia 67  Trop < 03,  04,  05       Lactic acid 2 6   Wbc 12 9, 9 7, 10 21  hgb 12 9, 11 3, 10 5     hct 39, 32 2, 30 6  Pt 13 8      LP: clear CSF; 62 wbc   72 protein  UA: +1 gluc   Tr bld   Tr leuks   2-4 rbc/wbc   occ mucous/bacteria/epithelials  Urine c&s: mixed contaminants  MRI brain:    1  Mild, chronic microangiopathy  2   No acute infarction, intracranial hemorrhage or mass  3   Scattered mild to moderate sinusitis  Past Medical/Surgical History:    Active Ambulatory Problems     Diagnosis Date Noted    Diabetes mellitus ([facility] [address] ) 01/28/2017    Hypertension 01/28/2017    Sepsis ([facility] [address] ) 10/19/2018    Hypokalemia 10/19/2018    Acute encephalopathy 10/19/2018    Cushing's disease ([address] ) 10/19/2018       Past Medical History:   Diagnosis Date    A-fib ([address]     Cushing's disease ([address]     Diabetes mellitus ([address]     Hypertension     Kidney stones     Migraine     Renal disorder     Rotator cuff injury 01/2017       Admitting Diagnosis: Severe sepsis ([address] ) [A41 9, R65 20]    Age/Sex: 68 y o  female    Assessment/Plan: 69 yo female directly admitted from [facility] to [facility] CLOSED due to higher level of care required for acute encephalopathy 2nd to sepsis vs  Toxic metabolic medication side effect from mifepristone  Sepsis is 2nd to HSV encephalitis vs  Gm pos rods, possibly listeria  R/o arrhythmia, watch for seizures  Unimproved with cefepime and vanco, will need LP done by neuro, MRI brain with attention to temporal lobe, start empiric acyclovir for HSV, will need antbx coverage for listeria-ampicillin  Cons ID  change antibiotics from cefepime/vanc to ampicillin/ceftriaxone/vancomycin      Admission Orders:  Scheduled Meds:   Current Facility-Administered Medications:  acetaminophen 325 mg Rectal Q4H PRN   acyclovir 10 mg/kg (Ideal) Intravenous Q8H   ampicillin 2,000 mg Intravenous Q4H   cefTRIAXone 2,000 mg Intravenous Q12H   diltiazem 240 mg Oral Daily   enoxaparin 40 mg Subcutaneous Q24H MARQUEZ   insulin lispro 1-5 Units Subcutaneous TID AC   labetalol 10 mg Intravenous Q6H PRN   sodium chloride was 125/hr changed to  100 mL/hr Intravenous Continuous   thiamine 100 mg Oral Daily   vancomycin 750 mg Intravenous Q12H [address]   IV hydralazine x 1 on 10/20  D5 /hr dc 10/21 @0923    Gluc checks ac / hs  scd's  Turn q2h  MRI brain  Cbc, cmp in am  CSF studies: rbc, c&s, gm stain, west nile virus, cmp  hse diet  Cons ID  Cons neuro    Per ID: severe sepsis with fever up to 104, could be 2nd to primary CNS process with possible bacteremia  Doubt UTI as cause given severity of encephalopathy  Empiric vanco/cefepime  start IV acyclovir 10 milligrams/kilogram q 8 hours  Recommend good IV hydration and close monitoring of renal function  - start IV ampicillin 2 g q 4 hours  - start IV ceftriaxone 2 g q 12 hours  - start IV vancomycin  Consult pharmacy for dosing recommendations with goal trough of 15-20   - followup lumbar puncture results  - check CSF glucose, protein, Gram stain, culture, comprehensive PCR, West Nile  - follow-up MRI brain  Gram-positive bacteremia  So far, 1 of 2 blood cultures drawn at [facility] is positive for gram-positive rods      Listeria is a consideration        Per neuro 10/20:   Assessment:  Patient exhibits mixed aphasia, with subtle additional evidence of left hemispheric dysfunction  In conjunction with her fever on presentation, concern would be for CNS infection (HSV or bacterial)  The lack of procalcitonin elevation is puzzling  It is possible that she simply suffered an embolic stroke, not yet visible on CT, and perhaps with fever and lactic acidosis, along with the observation of fecal incontinence are resultant from an associated nocturnal seizure  I do not think the timing quite fits to attribute this to strictly medication effect  Plan:  1  LP performed at the bedside  2  MRI brain  3  Empiric antimicrobial coverage, as prescribed by infectious disease consultant   4   Would not advocate empiric anticonvulsant treatment at this time, but that may be reconsidered pending her progress (certainly if she has any witnessed seizure occurrence)    ==============================================  The below clinical review was completed for [address] to remove it from this fax    ==============================================

## 2018-10-22 LAB
ALBUMIN SERPL BCP-MCNC: 2.9 G/DL (ref 3.5–5)
ALP SERPL-CCNC: 31 U/L (ref 46–116)
ALT SERPL W P-5'-P-CCNC: 12 U/L (ref 12–78)
ANION GAP SERPL CALCULATED.3IONS-SCNC: 7 MMOL/L (ref 4–13)
AST SERPL W P-5'-P-CCNC: 10 U/L (ref 5–45)
BASOPHILS # BLD AUTO: 0.02 THOUSANDS/ΜL (ref 0–0.1)
BASOPHILS NFR BLD AUTO: 0 % (ref 0–1)
BILIRUB SERPL-MCNC: 0.56 MG/DL (ref 0.2–1)
BUN SERPL-MCNC: 5 MG/DL (ref 5–25)
C GATTII+NEOFOR DNA CSF QL NAA+NON-PROBE: NOT DETECTED
CALCIUM SERPL-MCNC: 9.3 MG/DL (ref 8.3–10.1)
CHLORIDE SERPL-SCNC: 105 MMOL/L (ref 100–108)
CMV DNA CSF QL NAA+NON-PROBE: NOT DETECTED
CO2 SERPL-SCNC: 31 MMOL/L (ref 21–32)
CREAT SERPL-MCNC: 0.87 MG/DL (ref 0.6–1.3)
E COLI K1 DNA CSF QL NAA+NON-PROBE: NOT DETECTED
EOSINOPHIL # BLD AUTO: 0.16 THOUSAND/ΜL (ref 0–0.61)
EOSINOPHIL NFR BLD AUTO: 2 % (ref 0–6)
ERYTHROCYTE [DISTWIDTH] IN BLOOD BY AUTOMATED COUNT: 12.5 % (ref 11.6–15.1)
EV RNA CSF QL NAA+NON-PROBE: NOT DETECTED
GFR SERPL CREATININE-BSD FRML MDRD: 67 ML/MIN/1.73SQ M
GLUCOSE SERPL-MCNC: 105 MG/DL (ref 65–140)
GLUCOSE SERPL-MCNC: 110 MG/DL (ref 65–140)
GLUCOSE SERPL-MCNC: 113 MG/DL (ref 65–140)
GLUCOSE SERPL-MCNC: 89 MG/DL (ref 65–140)
GLUCOSE SERPL-MCNC: 99 MG/DL (ref 65–140)
GP B STREP DNA CSF QL NAA+NON-PROBE: NOT DETECTED
HAEM INFLU DNA CSF QL NAA+NON-PROBE: NOT DETECTED
HCT VFR BLD AUTO: 30.2 % (ref 34.8–46.1)
HGB BLD-MCNC: 10.1 G/DL (ref 11.5–15.4)
HHV6 DNA CSF QL NAA+NON-PROBE: NOT DETECTED
HSV1 DNA CSF QL NAA+NON-PROBE: NOT DETECTED
HSV2 DNA CSF QL NAA+NON-PROBE: NOT DETECTED
IMM GRANULOCYTES # BLD AUTO: 0.04 THOUSAND/UL (ref 0–0.2)
IMM GRANULOCYTES NFR BLD AUTO: 1 % (ref 0–2)
L MONOCYTOG DNA CSF QL NAA+NON-PROBE: NOT DETECTED
LYMPHOCYTES # BLD AUTO: 1.23 THOUSANDS/ΜL (ref 0.6–4.47)
LYMPHOCYTES NFR BLD AUTO: 15 % (ref 14–44)
MAGNESIUM SERPL-MCNC: 1.3 MG/DL (ref 1.6–2.6)
MCH RBC QN AUTO: 29.4 PG (ref 26.8–34.3)
MCHC RBC AUTO-ENTMCNC: 33.4 G/DL (ref 31.4–37.4)
MCV RBC AUTO: 88 FL (ref 82–98)
MONOCYTES # BLD AUTO: 0.67 THOUSAND/ΜL (ref 0.17–1.22)
MONOCYTES NFR BLD AUTO: 8 % (ref 4–12)
N MEN DNA CSF QL NAA+NON-PROBE: NOT DETECTED
NEUTROPHILS # BLD AUTO: 6.24 THOUSANDS/ΜL (ref 1.85–7.62)
NEUTS SEG NFR BLD AUTO: 74 % (ref 43–75)
NRBC BLD AUTO-RTO: 0 /100 WBCS
PARECHOVIRUS A RNA CSF QL NAA+NON-PROBE: NOT DETECTED
PLATELET # BLD AUTO: 159 THOUSANDS/UL (ref 149–390)
PMV BLD AUTO: 11 FL (ref 8.9–12.7)
POTASSIUM SERPL-SCNC: 2.8 MMOL/L (ref 3.5–5.3)
PROT SERPL-MCNC: 5.9 G/DL (ref 6.4–8.2)
RBC # BLD AUTO: 3.43 MILLION/UL (ref 3.81–5.12)
S PNEUM DNA CSF QL NAA+NON-PROBE: NOT DETECTED
SODIUM SERPL-SCNC: 143 MMOL/L (ref 136–145)
VZV DNA CSF QL NAA+NON-PROBE: NOT DETECTED
WBC # BLD AUTO: 8.36 THOUSAND/UL (ref 4.31–10.16)

## 2018-10-22 PROCEDURE — 80053 COMPREHEN METABOLIC PANEL: CPT | Performed by: INTERNAL MEDICINE

## 2018-10-22 PROCEDURE — 82948 REAGENT STRIP/BLOOD GLUCOSE: CPT

## 2018-10-22 PROCEDURE — 83735 ASSAY OF MAGNESIUM: CPT | Performed by: INTERNAL MEDICINE

## 2018-10-22 PROCEDURE — 99232 SBSQ HOSP IP/OBS MODERATE 35: CPT | Performed by: INTERNAL MEDICINE

## 2018-10-22 PROCEDURE — 85025 COMPLETE CBC W/AUTO DIFF WBC: CPT | Performed by: INTERNAL MEDICINE

## 2018-10-22 RX ORDER — MAGNESIUM SULFATE HEPTAHYDRATE 40 MG/ML
2 INJECTION, SOLUTION INTRAVENOUS ONCE
Status: COMPLETED | OUTPATIENT
Start: 2018-10-22 | End: 2018-10-22

## 2018-10-22 RX ORDER — POTASSIUM CHLORIDE 14.9 MG/ML
20 INJECTION INTRAVENOUS
Status: COMPLETED | OUTPATIENT
Start: 2018-10-22 | End: 2018-10-22

## 2018-10-22 RX ADMIN — POTASSIUM CHLORIDE 20 MEQ: 200 INJECTION, SOLUTION INTRAVENOUS at 14:02

## 2018-10-22 RX ADMIN — ACYCLOVIR SODIUM 525 MG: 50 INJECTION, SOLUTION INTRAVENOUS at 21:45

## 2018-10-22 RX ADMIN — Medication 100 MG: at 08:30

## 2018-10-22 RX ADMIN — ACYCLOVIR SODIUM 525 MG: 50 INJECTION, SOLUTION INTRAVENOUS at 13:49

## 2018-10-22 RX ADMIN — DILTIAZEM HYDROCHLORIDE 240 MG: 240 CAPSULE, COATED, EXTENDED RELEASE ORAL at 08:30

## 2018-10-22 RX ADMIN — ACYCLOVIR SODIUM 525 MG: 50 INJECTION, SOLUTION INTRAVENOUS at 05:02

## 2018-10-22 RX ADMIN — ENOXAPARIN SODIUM 40 MG: 40 INJECTION SUBCUTANEOUS at 08:30

## 2018-10-22 RX ADMIN — POTASSIUM CHLORIDE 20 MEQ: 200 INJECTION, SOLUTION INTRAVENOUS at 11:38

## 2018-10-22 RX ADMIN — MAGNESIUM SULFATE IN WATER 2 G: 40 INJECTION, SOLUTION INTRAVENOUS at 13:53

## 2018-10-22 RX ADMIN — POTASSIUM CHLORIDE 20 MEQ: 200 INJECTION, SOLUTION INTRAVENOUS at 16:18

## 2018-10-22 NOTE — SOCIAL WORK
CM met with patient at bedside to address discharge needs  CM pointed out name/number on the white board and communicated she was that individual  Patient had two visitors present; CM asked permission to speak in front of them, which was granted  Patient lives with her dtr, Valery Law; [address]  Patient's bedroom is located on the 2nd floor; patient denies difficulty with steps  Patient is independent with ADLs and functional mobility  Food shopping & meal prep is done by patient  Patient does not use any DMEs for ambulation purposes  Patient is able to ambulate without assistance from a seated or laying position  No hx of VNA reported  Patient is not involved in any community activities  Patient is employed  PCP identified as Dr Jose Juan Durant  Patient's LACE score was below 70 (it was 66), therefore no TCM/HRR appt will be scheduled  No POA identified but dtr, Ambrosio Flanagan, is permitted to be patient's healthcare representative and spokesperson for the family  Dtr Valery Law is designated caregiver if/when needed  Patient uses [facility] in [address] [place] [address] for Rx needs; patient made aware of [address] to use at discharge if needed  Patient does drive; reports one of her daughters are available to transport home  Is this not a readmission within the last 30 days  Patient reports she has and takes all her prescribed medications; she also reports she has multiple upcoming physician appointments; one for endo, cardio and ophthalmologist    Patient does not have any needs/concerns that she would like CM to address while she is here  No discharge needs present at this time  CM will remain available and follow as needed  CM also encouraged patient to follow up with all recommended appointments after discharge  Patient advised of importance for patient and family to participate in managing patients medical well being  GIA did receive a call from [facility], [address] [place] [address]    Spoke to TAMEKA ([phone])  She offered to assist in any d/c planning  CM expressed that pt works six days a week and seems to be independent, so at this point in time it appears as though she will not have any d/c needs  She requested a call back if that should change

## 2018-10-22 NOTE — PROGRESS NOTES
[facility] Progress Note  Patient: Lisbeth Ruiz 68 y o  female   MRN: [id number]  PCP: Jackie Crawford  Unit/Bed#: [facility] [address] 227-01 Encounter: [id number]  Date Of Visit: 10/22/18      Assessment/plan  1  Severe sepsis poa due to aseptic meningitis- appreciate ID recommendations  There was concern for bacterial infection as one of the blood cultures was positive for gram positive bacteremia that ended up being lactococcus which is a skin contaminant  Due to this she was treated originally with vanco/cefepime and acyclovir  Vanco/cefepime have been d/kush she was continued on acyclovir  Pt is s/p LP on 10/20  Awaiting results  2  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  Appreciate neurology recommendations and ID  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  Continue acyclovir  Awaiting seriologies from LP  Pt is improving  3  Cushing disease- continue to hold mifepristone  Cortisol level wnl    4  htn- continue diltiazem  Continue to hold arb while on acyclovir  Continue prn IV antihypertensives    5  Hypokalemia- replace and check magnesium    6  Type 2 diabetes- a1c is 5 4  Continue insulin sliding scale  Pt is on metformin at home  dispo- will consult physical therapy      Subjective:   Pt seen and examined  Pt improved  She is not confused today  She feels stronger today  She denies headache and neck stiffness  No double/blurred vision  No f/c no cp no sob no n/v/d no abd pain    Objective:     Vitals: Blood pressure 160/75, pulse 61, temperature 97 6 °F (36 4 °C), temperature source Temporal, resp  rate 18, SpO2 94 %  ,There is no height or weight on file to calculate BMI  Lab, Imaging and other studies:    Results from last 7 days  Lab Units 10/22/18  0508  10/19/18  1131   WBC Thousand/uL 8 36  < > 12 90*   HEMOGLOBIN g/dL 10 1*  < > 12 9   HEMATOCRIT % 30 2*  < > 39 0   PLATELETS Thousands/uL 159  < > 227   INR   --   --  1 19   < > = values in this interval not displayed      Results from last 7 days  Lab Units 10/22/18  0508   SODIUM mmol/L 143   POTASSIUM mmol/L 2 8*   CHLORIDE mmol/L 105   CO2 mmol/L 31   BUN mg/dL 5   CREATININE mg/dL 0 87   CALCIUM mg/dL 9 3   ALK PHOS U/L 31*   ALT U/L 12   AST U/L 10       Results from last 7 days  Lab Units 10/20/18  0603 10/19/18  1830 10/19/18  1509   TROPONIN I ng/mL 0 03 0 05* 0 04*     Lab Results   Component Value Date    BLOODCX No Growth at 48 hrs  10/19/2018    BLOODCX Lactococcus garvieae (A) 10/19/2018    URINECX 10,000-19,000 cfu/ml  10/19/2018    URINECX <1000 cfu/ml Mixed Contaminants X2 01/29/2017     Scheduled Meds:   Current Facility-Administered Medications:  acetaminophen 650 mg Oral Q6H PRN Saleem Hurtado MD    acyclovir 10 mg/kg (Ideal) Intravenous Q8H Dayna Solis DO Last Rate: 525 mg (10/22/18 0502)   diltiazem 240 mg Oral Daily Saleem Hurtado MD    enoxaparin 40 mg Subcutaneous Q24H [address] Saleem Hurtado MD    insulin lispro 1-5 Units Subcutaneous TID [facility] Saleem Hurtado MD    labetalol 10 mg Intravenous Q6H PRN Saleem Hurtado MD    ondansetron 4 mg Intravenous Q4H PRN Saleem Hurtado MD    potassium chloride 20 mEq Intravenous Q2H Sammie Yung DO    sodium chloride 100 mL/hr Intravenous Continuous Saleem Hurtado MD Last Rate: 100 mL/hr (10/21/18 2240)   thiamine 100 mg Oral Daily Saleem Hurtado MD      Continuous Infusions:   sodium chloride 100 mL/hr Last Rate: 100 mL/hr (10/21/18 2240)     PRN Meds:   acetaminophen    labetalol    ondansetron      Physical exam:  Physical Exam  General appearance: alert and oriented, in no acute distress  Head: Normocephalic, without obvious abnormality, atraumatic  Eyes: conjunctivae/corneas clear  PERRL, EOM's intact  Fundi benign    Neck: no adenopathy, no carotid bruit, no JVD, supple, symmetrical, trachea midline and thyroid not enlarged, symmetric, no tenderness/mass/nodules  Lungs: clear to auscultation bilaterally  Heart: regular rate and rhythm, S1, S2 normal, no murmur, click, rub or gallop  Abdomen: soft, non-tender; bowel sounds normal; no masses,  no organomegaly  Extremities: extremities normal, warm and well-perfused; no cyanosis, clubbing, or edema  Pulses: 2+ and symmetric  Skin: Skin color, texture, turgor normal  No rashes or lesions  Neurologic: Grossly normal      VTE Pharmacologic Prophylaxis: Enoxaparin (Lovenox)  VTE Mechanical Prophylaxis: sequential compression device    Counseling / Coordination of Care  Total floor / unit time spent today 20 minutes    Current Length of Stay: 2 day(s)    Current Patient Status: Inpatient     Code Status: Level 1 - Full Code

## 2018-10-22 NOTE — PROGRESS NOTES
Progress Note - Infectious Disease   Beronica Salgado 68 y o  female MRN: [id number]  Unit/Bed#: [facility] [address] 227-01 Encounter: [id number]      Impression/Recommendations:    1  Severe sepsis, present on admission to [address] [place] [address] [address] [facility] on 10/19   With reported fever of 104, leukocytosis, lactic acidosis  May be secondary to primary CNS process, as stated below   Bacteremia is also a possibility, although less likely  Doubt UTI as the cause given severity of encephalopathy  Patient was started empirically on vancomycin and cefepime   Leukocytosis and lactic acidosis has improved   No fevers here  Fortunately, remains hemodynamically stable, nontoxic      -  Antibiotic plan as below  - monitor temperatures and hemodynamics  - check CBC in a m   -  Supportive care     2  Aseptic meningitis/encephalitis  Lumbar puncture revealed 62 WBCs  Glucose was 71, protein 72  Doubt bacterial meningitis based on patient's clinical presentation, LP findings  CSF gram stain and culture are negative  Consideration for viral meningitis/encephalitis  Also lower suspicion for HSV encephalitis as MRI does not show any temporal lobe enhancement  We can also see CSF pleocytosis with seizures      -continue IV acyclovir for now pending HSV PCR  -hold additional antibiotics  -follow up CSF comprehensive PCR  -follow-up CSF for West Nile virus      3  Acute encephalopathy   Consideration for viral encephalitis based on patient's presentation  Berto Kan definitively rule out bacterial etiology as presentation was very acute in onset, although doubtful as stated above  CT head was negative  Also consideration for seizure activity  MRI with no focal findings  Mental status has shown significant improvement      -follow-up neurology recommendations  -followup remainder of CSF studies  -serial exams      4  Gram-positive bacteremia   So far, 1 of 2 blood cultures drawn at [address] [place] [address] [address] now positive for lactococcus  This is likely a skin contaminant    The other blood culture remains negative      -continue to follow up blood culture data  -no additional antibiotic for this      5  Cushings disease   Patient was recently started on Korlym about 1 month ago  Crist Buerger is currently on hold      Antibiotics:  Acyclovir IV D3    Discussed with patient and her daughters at bedside  Subjective:  Patient more awake and alert today  Headache has improved  No fevers, chills, nausea, vomiting, diarrhea, focal weakness  Objective:  Vitals:  Temp:  [97 6 °F (36 4 °C)-98 4 °F (36 9 °C)] 97 6 °F (36 4 °C)  HR:  [61-69] 61  Resp:  [18-20] 18  BP: (149-160)/(75-78) 160/75  SpO2:  [94 %-96 %] 94 %  Temp (24hrs), Av 1 °F (36 7 °C), Min:97 6 °F (36 4 °C), Max:98 4 °F (36 9 °C)  Current: Temperature: 97 6 °F (36 4 °C)    Physical Exam:   General:  No acute distress  Psychiatric:  Awake and alert  Pulmonary:  Normal respiratory excursion without accessory muscle use  Abdomen:  Soft, nontender  Extremities:  No edema  No Meningeal signs  Skin:  No rashes    Lab Results:  I have personally reviewed pertinent labs  Results from last 7 days  Lab Units 10/22/18  0508 10/21/18  0532 10/20/18  0603  10/19/18  1131   SODIUM mmol/L 143 142 139  < > 140   POTASSIUM mmol/L 2 8* 2 7* 3 0*  < > 2 6*   CHLORIDE mmol/L 105 105 103  < > 97*   CO2 mmol/L 31 30 28  < > 32*   BUN mg/dL 5 6 9  < > 9   CREATININE mg/dL 0 87 0 82 0 89  < > 0 88   EGFR ml/min/1 73sq m 67 72 65  < > 66   CALCIUM mg/dL 9 3 9 5 9 7  < > 11 0*   AST U/L 10  --  13  --  15   ALT U/L 12  --  11  --  11   ALK PHOS U/L 31*  --  27*  --  33*   < > = values in this interval not displayed  Results from last 7 days  Lab Units 10/22/18  0508 10/21/18  0532 10/20/18  0603   WBC Thousand/uL 8 36 10 21* 9 70   HEMOGLOBIN g/dL 10 1* 10 5* 11 3*   PLATELETS Thousands/uL 159 171 172       Results from last 7 days  Lab Units 10/20/18  1457 10/19/18  1202 10/19/18  1131   BLOOD CULTURE   --   --  No Growth at 48 hrs    Lactococcus kimberly*   GRAM STAIN RESULT  4+ Polys  2+ Mononuclear Cells  No bacteria seen  --  Gram positive cocci in chains  Gram negative rods  Gram positive cocci in clusters   URINE CULTURE   --  10,000-19,000 cfu/ml   --        Imaging Studies:   I have personally reviewed pertinent imaging study reports and images in PACS  EKG, Pathology, and Other Studies:   I have personally reviewed pertinent reports

## 2018-10-22 NOTE — UTILIZATION REVIEW
Notification of Discharge  This is a Notification of Discharge from our facility [address]  Please be advised that this patient has been discharge from our facility  Below you will find the admission and discharge date and time including the patients disposition  PRESENTATION DATE: 10/19/2018 11:20 AM  IP ADMISSION DATE: 10/19/18 1305  DISCHARGE DATE: 10/20/2018 10:23 AM  DISPOSITION: [facility] in the [facility] [address] [facility]  Phone: [phone]; Fax [phone]  ATTENTION: The Network Utilization Review Department is now centralized for our 9 Facilities  Make a note that we have a new phone and fax numbers for our Department  Please call with any questions or concerns to [phone] and carefully follow the prompts so that you are directed to the right person  All voicemails are confidential  Fax any determinations, approvals, denials, and requests for initial or continue stay review clinical to [phone]  Due to HIGH CALL volume, it would be easier if you could please send faxed requests to expedite your requests and in part, help us provide discharge notifications faster

## 2018-10-22 NOTE — PLAN OF CARE
Problem: DISCHARGE PLANNING - CARE MANAGEMENT  Goal: Discharge to post-acute care or home with appropriate resources  INTERVENTIONS:  - Conduct assessment to determine patient/family and health care team treatment goals, and need for post-acute services based on payer coverage, community resources, and patient preferences, and barriers to discharge  - Address psychosocial, clinical, and financial barriers to discharge as identified in assessment in conjunction with the patient/family and health care team  - Arrange appropriate level of post-acute services according to patients   needs and preference and payer coverage in collaboration with the physician and health care team  - Communicate with and update the patient/family, physician, and health care team regarding progress on the discharge plan  - Arrange appropriate transportation to post-acute venues  Outcome: Adequate for Discharge  CM to follow if needs should be identified, but at this time, pt seems independent

## 2018-10-23 LAB
ANION GAP SERPL CALCULATED.3IONS-SCNC: 7 MMOL/L (ref 4–13)
BACTERIA BLD CULT: ABNORMAL
BACTERIA CSF CULT: NO GROWTH
BUN SERPL-MCNC: 5 MG/DL (ref 5–25)
CALCIUM SERPL-MCNC: 9.8 MG/DL (ref 8.3–10.1)
CHLORIDE SERPL-SCNC: 104 MMOL/L (ref 100–108)
CO2 SERPL-SCNC: 31 MMOL/L (ref 21–32)
CREAT SERPL-MCNC: 0.92 MG/DL (ref 0.6–1.3)
ERYTHROCYTE [DISTWIDTH] IN BLOOD BY AUTOMATED COUNT: 12.4 % (ref 11.6–15.1)
GFR SERPL CREATININE-BSD FRML MDRD: 62 ML/MIN/1.73SQ M
GLUCOSE SERPL-MCNC: 107 MG/DL (ref 65–140)
GLUCOSE SERPL-MCNC: 110 MG/DL (ref 65–140)
GLUCOSE SERPL-MCNC: 115 MG/DL (ref 65–140)
GLUCOSE SERPL-MCNC: 149 MG/DL (ref 65–140)
GLUCOSE SERPL-MCNC: 93 MG/DL (ref 65–140)
GRAM STN SPEC: ABNORMAL
HCT VFR BLD AUTO: 33.3 % (ref 34.8–46.1)
HGB BLD-MCNC: 11.3 G/DL (ref 11.5–15.4)
MCH RBC QN AUTO: 30 PG (ref 26.8–34.3)
MCHC RBC AUTO-ENTMCNC: 33.9 G/DL (ref 31.4–37.4)
MCV RBC AUTO: 88 FL (ref 82–98)
PLATELET # BLD AUTO: 211 THOUSANDS/UL (ref 149–390)
PMV BLD AUTO: 10.5 FL (ref 8.9–12.7)
POTASSIUM SERPL-SCNC: 3.1 MMOL/L (ref 3.5–5.3)
RBC # BLD AUTO: 3.77 MILLION/UL (ref 3.81–5.12)
SODIUM SERPL-SCNC: 142 MMOL/L (ref 136–145)
WBC # BLD AUTO: 10.15 THOUSAND/UL (ref 4.31–10.16)

## 2018-10-23 PROCEDURE — 99232 SBSQ HOSP IP/OBS MODERATE 35: CPT | Performed by: INTERNAL MEDICINE

## 2018-10-23 PROCEDURE — 82948 REAGENT STRIP/BLOOD GLUCOSE: CPT

## 2018-10-23 PROCEDURE — 80048 BASIC METABOLIC PNL TOTAL CA: CPT | Performed by: INTERNAL MEDICINE

## 2018-10-23 PROCEDURE — 99232 SBSQ HOSP IP/OBS MODERATE 35: CPT | Performed by: PSYCHIATRY & NEUROLOGY

## 2018-10-23 PROCEDURE — G8979 MOBILITY GOAL STATUS: HCPCS

## 2018-10-23 PROCEDURE — 97163 PT EVAL HIGH COMPLEX 45 MIN: CPT

## 2018-10-23 PROCEDURE — 85027 COMPLETE CBC AUTOMATED: CPT | Performed by: INTERNAL MEDICINE

## 2018-10-23 PROCEDURE — G8978 MOBILITY CURRENT STATUS: HCPCS

## 2018-10-23 PROCEDURE — 99233 SBSQ HOSP IP/OBS HIGH 50: CPT | Performed by: INTERNAL MEDICINE

## 2018-10-23 RX ORDER — POTASSIUM CHLORIDE 14.9 MG/ML
20 INJECTION INTRAVENOUS
Status: COMPLETED | OUTPATIENT
Start: 2018-10-23 | End: 2018-10-23

## 2018-10-23 RX ADMIN — ACYCLOVIR SODIUM 525 MG: 50 INJECTION, SOLUTION INTRAVENOUS at 21:17

## 2018-10-23 RX ADMIN — ACYCLOVIR SODIUM 525 MG: 50 INJECTION, SOLUTION INTRAVENOUS at 13:37

## 2018-10-23 RX ADMIN — ACYCLOVIR SODIUM 525 MG: 50 INJECTION, SOLUTION INTRAVENOUS at 05:35

## 2018-10-23 RX ADMIN — POTASSIUM CHLORIDE 20 MEQ: 200 INJECTION, SOLUTION INTRAVENOUS at 10:53

## 2018-10-23 RX ADMIN — Medication 100 MG: at 08:25

## 2018-10-23 RX ADMIN — POTASSIUM CHLORIDE 20 MEQ: 200 INJECTION, SOLUTION INTRAVENOUS at 12:35

## 2018-10-23 RX ADMIN — ACETAMINOPHEN 650 MG: 325 TABLET, FILM COATED ORAL at 07:32

## 2018-10-23 RX ADMIN — SODIUM CHLORIDE 100 ML/HR: 0.9 INJECTION, SOLUTION INTRAVENOUS at 16:51

## 2018-10-23 RX ADMIN — ENOXAPARIN SODIUM 40 MG: 40 INJECTION SUBCUTANEOUS at 08:25

## 2018-10-23 RX ADMIN — DILTIAZEM HYDROCHLORIDE 240 MG: 240 CAPSULE, COATED, EXTENDED RELEASE ORAL at 08:25

## 2018-10-23 NOTE — PLAN OF CARE
CARDIOVASCULAR - ADULT     Maintains optimal cardiac output and hemodynamic stability Progressing     Absence of cardiac dysrhythmias or at baseline rhythm Progressing        DISCHARGE PLANNING     Discharge to home or other facility with appropriate resources Progressing        DISCHARGE PLANNING - CARE MANAGEMENT     Discharge to post-acute care or home with appropriate resources Progressing        INFECTION - ADULT     Absence or prevention of progression during hospitalization Progressing     Absence of fever/infection during neutropenic period Progressing        Knowledge Deficit     Patient/family/caregiver demonstrates understanding of disease process, treatment plan, medications, and discharge instructions Progressing        MUSCULOSKELETAL - ADULT     Maintain or return mobility to safest level of function Progressing     Maintain proper alignment of affected body part Progressing        NEUROSENSORY - ADULT     Achieves stable or improved neurological status Progressing     Absence of seizures Progressing     Remains free of injury related to seizures activity Progressing     Achieves maximal functionality and self care Progressing        Potential for Falls     Patient will remain free of falls Progressing        Prexisting or High Potential for Compromised Skin Integrity     Skin integrity is maintained or improved Progressing        SAFETY ADULT     Maintain or return to baseline ADL function Progressing     Maintain or return mobility status to optimal level Progressing        SKIN/TISSUE INTEGRITY - ADULT     Skin integrity remains intact Progressing     Incision(s), wounds(s) or drain site(s) healing without S/S of infection Progressing     Oral mucous membranes remain intact Progressing

## 2018-10-23 NOTE — PROGRESS NOTES
Progress Note - Infectious Disease   Estela Izaguirre 68 y o  female MRN: [id number]  Unit/Bed#: [facility] [address] 227-01 Encounter: [id number]      mpression/Recommendations:    1  Severe sepsis, present on admission to [address] [place] [address] [address]  on 10/19   With reported fever of 104, leukocytosis, lactic acidosis  May be secondary to primary CNS process, as stated below   Bacteremia is also a possibility, although less likely  Doubt UTI as the cause given severity of encephalopathy  Patient was started empirically on vancomycin and cefepime   Leukocytosis and lactic acidosis has improved   No fevers here  Fortunately, remains hemodynamically stable, nontoxic      -  Antibiotic plan as below  - monitor temperatures and hemodynamics  - check CBC in a m   -  Supportive care     2  Aseptic meningitis/encephalitis   Lumbar puncture revealed 62 WBCs   Glucose was 71, protein 72  Doubt bacterial meningitis based on patient's clinical presentation, LP findings   CSF gram stain and culture are negative   Consideration for viral meningitis/encephalitis   Also lower suspicion for HSV encephalitis as MRI does not show any temporal lobe enhancement   We can also see CSF pleocytosis with seizures  HSV PCR now negative  Based on negative MRI findings and CSF PCR, I have a very low suspicion for HSV encephalitis  However, may be false negative in the acute setting  We can consider repeating LP and rechecking for a late positive HSV PCR     -continue IV acyclovir for now  -hold additional antibiotics  -follow-up CSF for West Nile virus  -consider repeating LP to reassess HSV PCR, cell count  Patient and her daughter will consider this option       3  Acute encephalopathy   Consideration for viral encephalitis based on patient's presentation  Akrtik Reason definitively rule out bacterial etiology as presentation was very acute in onset, although doubtful as stated above   CT head was negative   Also consideration for seizure activity   MRI with no focal findings  Mental status has continued to show significant improvement      -follow-up neurology recommendations  -followup remainder of CSF studies  -serial exams      4  Gram-positive bacteremia   So far, 1 of 2 blood cultures drawn at [facility] positive for lactococcus   This is likely a skin contaminant   The other blood culture remains negative      -continue to follow up blood culture data  -no additional antibiotic for this      5  Cushings disease   Patient was recently started on Korlym about 1 month ago   This is currently on hold      Antibiotics:  Acyclovir IV D4     Discussed with patient and her daughter at bedside, and with Dr Norberto Quiroga        Subjective:  Patient much more awake and alert  Sitting in chair comfortably  Complains of mild headache  No neck pain or stiffness  No focal weakness  Denies fevers, chills, or sweats  Denies nausea, vomiting, or diarrhea  Objective:  Vitals:  Temp:  [97 5 °F (36 4 °C)-99 °F (37 2 °C)] 97 5 °F (36 4 °C)  HR:  [62-75] 62  Resp:  [18-19] 19  BP: (149-176)/(71-82) 176/78  SpO2:  [86 %-97 %] 97 %  Temp (24hrs), Av 2 °F (36 8 °C), Min:97 5 °F (36 4 °C), Max:99 °F (37 2 °C)  Current: Temperature: 97 5 °F (36 4 °C)    Physical Exam:   General:  Well-nourished, well-developed, in no acute distress  Eyes:  Conjunctive clear with no hemorrhages or effusions  Oropharynx:  No ulcers, no lesions  Neck:  Supple, no lymphadenopathy  Lungs:  Clear to auscultation bilaterally, no accessory muscle use  Cardiac:  Regular rate and rhythm, no murmurs  Abdomen:  Soft, non-tender, non-distented  Extremities:  No peripheral cyanosis, clubbing, or edema  Skin:  No rashes, no ulcers  Neurological:  Moves all four extremities spontaneously, sensation grossly intact    Lab Results:  I have personally reviewed pertinent labs      Results from last 7 days  Lab Units 10/23/18  0526 10/22/18  0508 10/21/18  0532 10/20/18  0603  10/19/18  1131   SODIUM mmol/L 142 143 142 139  < > 140   POTASSIUM mmol/L 3 1* 2 8* 2 7* 3 0*  < > 2 6*   CHLORIDE mmol/L 104 105 105 103  < > 97*   CO2 mmol/L 31 31 30 28  < > 32*   BUN mg/dL 5 5 6 9  < > 9   CREATININE mg/dL 0 92 0 87 0 82 0 89  < > 0 88   EGFR ml/min/1 73sq m 62 67 72 65  < > 66   CALCIUM mg/dL 9 8 9 3 9 5 9 7  < > 11 0*   AST U/L  --  10  --  13  --  15   ALT U/L  --  12  --  11  --  11   ALK PHOS U/L  --  31*  --  27*  --  33*   < > = values in this interval not displayed  Results from last 7 days  Lab Units 10/23/18  0526 10/22/18  0508 10/21/18  0532   WBC Thousand/uL 10 15 8 36 10 21*   HEMOGLOBIN g/dL 11 3* 10 1* 10 5*   PLATELETS Thousands/uL 211 159 171       Results from last 7 days  Lab Units 10/20/18  1457 10/19/18  1202 10/19/18  1131   BLOOD CULTURE   --   --  Lactococcus garvieae*  Staphylococcus coagulase negative*  P[employer]a aggl[employer]ans*  No Growth at 72 hrs  GRAM STAIN RESULT  4+ Polys  2+ Mononuclear Cells  No bacteria seen  --  Gram positive cocci in chains  Gram negative rods  Gram positive cocci in clusters   URINE CULTURE   --  10,000-19,000 cfu/ml   --        Imaging Studies:   I have personally reviewed pertinent imaging study reports and images in PACS  EKG, Pathology, and Other Studies:   I have personally reviewed pertinent reports

## 2018-10-23 NOTE — PROGRESS NOTES
Progress Note - Neurology   [facility] 68 y o  female MRN: [id number]  Unit/Bed#: [address] 2 [address] 227-01 Encounter: [id number]    Assessment/ Plan:  1)  Acute encephalopathy with aphasia and sepsis - suspicious for viral meningitis, no clear agent at this time  -MRI B demonstrates no acute intracranial abnormality including no abnormal FLAIR signal   -Appreciate Infectious Disease     -Acyclovir Day 4,    -CSF studies to date:    Abnormal: Protein (72), WBC (62, percent neutrophils, 1% lymphs, 11% mononucleocytes)    Normal: Glucose, Culture and gram stain, Comprehensive PCR     Pending: West Nile    -Serum studies:     Abnormal: Blood Cxs (1 of 2 positive for lactococcus - likely skin contaminate)  Normal: Procalcitonin, ammonia   -Continue supportive care   -Medical management per primary team   -Will follow peripherally, please monitor exam and call with questions     Subjective:   From Nathan Blanc PA-C's progress note on 10/21/18:  "[facility] is a 68 y o  female with a history of Afib on Aspirin and Plavix, HTN, DM2, Cushing's disease on Mifepristone who presented to [facility] on 10/19 with fever of 104, leukocytosis, lactic acidosis, acute onset confusion and aphasia  Patient was transferred to [facility] and started on broad-spectrum a empiric antibiotics and acyclovir  LP was completed on 10/20  Initial studies revealing a moderately elevated WBC"     No acute events overnight  Pt continues to improve clinically  LP studies thus far unrevealing for a bacterial or viral infectious agent, with consideration of repeating LP as HSV may be present with a negative preliminary CSF PCR  A 12 point ROS was negative with exception of musculoskeletal chest pain and difficulty with full inspiration      ROS:  See Subjective     Medications:  Scheduled Meds:  Current Facility-Administered Medications:  acetaminophen 650 mg Oral Q6H PRN Saleem Hurtado MD    acyclovir 10 mg/kg (Ideal) Intravenous Q8H Dayna Aldea, DO Last Rate: 525 mg (10/23/18 0535)   diltiazem 240 mg Oral Daily Addie Rob MD    enoxaparin 40 mg Subcutaneous Q24H [address] Addie Rob MD    insulin lispro 1-5 Units Subcutaneous TID [facility] Addie Rob MD    labetalol 10 mg Intravenous Q6H PRN Addie Rob MD    ondansetron 4 mg Intravenous Q4H PRN Addie Rob MD    potassium chloride 20 mEq Intravenous Q2H Sammie Yung DO Last Rate: 20 mEq (10/23/18 1235)   sodium chloride 100 mL/hr Intravenous Continuous Addie Rob MD Last Rate: 100 mL/hr (10/23/18 0000)   thiamine 100 mg Oral Daily Addie Rob MD      Continuous Infusions:  sodium chloride 100 mL/hr Last Rate: 100 mL/hr (10/23/18 0000)     PRN Meds:   acetaminophen    labetalol    ondansetron      Vitals: Blood pressure (!) 176/78, pulse 62, temperature 97 5 °F (36 4 °C), temperature source Temporal, resp  rate 19, SpO2 97 %  ,There is no height or weight on file to calculate BMI  Physical Exam:   Physical Exam   Constitutional: She is oriented to person, place, and time  She appears well-developed and well-nourished  No distress  HENT:   Head: Normocephalic and atraumatic  Right Ear: External ear normal    Left Ear: External ear normal    Mouth/Throat: Oropharynx is clear and moist  No oropharyngeal exudate  Eyes: Conjunctivae are normal  Right eye exhibits no discharge  Left eye exhibits no discharge  No scleral icterus  Neck: Normal range of motion  Neck supple  No meningeal irritation noted    Pulmonary/Chest: Effort normal  No respiratory distress  Musculoskeletal: Normal range of motion  She exhibits no edema, tenderness or deformity  Neurological: She is oriented to person, place, and time  She has normal strength  She has a normal Finger-Nose-Finger Test    Skin: Skin is warm and dry  No rash noted  She is not diaphoretic  No erythema  No pallor  Psychiatric: She has a normal mood and affect  Her behavior is normal    Nursing note and vitals reviewed       Neurologic Exam Mental Status   Oriented to person, place, and time  Follows 3 step commands  Attention: normal  Concentration: normal    Level of consciousness: alert  Knowledge: good  Repetition and naming intact  Pt demonstrates mild cognitive slowing - able to eventually perform simple calculations but required numerous prompts  Able to name months backwards until July and missed several months  Could name current and immediate past president only without error  Minor difficulty with word finding appears more consistent with cognitive slowing rather than true aphasia  Cranial Nerves   Cranial nerves II through XII intact  Motor Exam   Muscle bulk: normal  Overall muscle tone: normal    Strength   Strength 5/5 throughout  Sensory Exam   Light touch normal      Gait, Coordination, and Reflexes     Gait  Gait: (deferred for safety)    Coordination   Finger to nose coordination: normal    Tremor   Resting tremor: absent    Reflexes   Right plantar: normal  Left plantar: normal  Right ankle clonus: absent  Left ankle clonus: absent      Lab, Imaging and other studies: I have personally reviewed pertinent reports       I have personally reviewed pertinent images in PACs  Recent Results (from the past 24 hour(s))   Fingerstick Glucose (POCT)    Collection Time: 10/22/18  4:04 PM   Result Value Ref Range    POC Glucose 110 65 - 140 mg/dl   Fingerstick Glucose (POCT)    Collection Time: 10/22/18  9:12 PM   Result Value Ref Range    POC Glucose 99 65 - 140 mg/dl   Basic metabolic panel    Collection Time: 10/23/18  5:26 AM   Result Value Ref Range    Sodium 142 136 - 145 mmol/L    Potassium 3 1 (L) 3 5 - 5 3 mmol/L    Chloride 104 100 - 108 mmol/L    CO2 31 21 - 32 mmol/L    ANION GAP 7 4 - 13 mmol/L    BUN 5 5 - 25 mg/dL    Creatinine 0 92 0 60 - 1 30 mg/dL    Glucose 110 65 - 140 mg/dL    Calcium 9 8 8 3 - 10 1 mg/dL    eGFR 62 ml/min/1 73sq m   CBC    Collection Time: 10/23/18  5:26 AM   Result Value Ref Range    WBC 10 15 4 31 - 10 16 Thousand/uL    RBC 3 77 (L) 3 81 - 5 12 Million/uL    Hemoglobin 11 3 (L) 11 5 - 15 4 g/dL    Hematocrit 33 3 (L) 34 8 - 46 1 %    MCV 88 82 - 98 fL    MCH 30 0 26 8 - 34 3 pg    MCHC 33 9 31 4 - 37 4 g/dL    RDW 12 4 11 6 - 15 1 %    Platelets 855 326 - 166 Thousands/uL    MPV 10 5 8 9 - 12 7 fL   Fingerstick Glucose (POCT)    Collection Time: 10/23/18  7:27 AM   Result Value Ref Range    POC Glucose 93 65 - 140 mg/dl   Fingerstick Glucose (POCT)    Collection Time: 10/23/18 11:08 AM   Result Value Ref Range    POC Glucose 107 65 - 140 mg/dl   ]    VTE Prophylaxis: Sequential compression device (Venodyne)  and Enoxaparin (Lovenox)    Counseling / Coordination of Care  Total time spent today 35 minutes  Greater than 50% of total time was spent with the patient and / or family counseling and / or coordination of care  A description of the counseling / coordination of care: The patient was seen examined by myself the attending physician  The chart was reviewed thoroughly, including laboratory values and imaging studies  The patient was counseled in the room

## 2018-10-23 NOTE — PHYSICAL THERAPY NOTE
PT EVALUATION    Pt  Name: Bhargavi Barrios  Pt  Age: 68 y o  MRN: [id number]  LENGTH OF STAY: 3    Patient Active Problem List   Diagnosis    [facility] mellitus [facility]    Hypertension    Sepsis ([facility] )    Hypokalemia    Acute encephalopathy    [facility]'s [facility] ([facility] )       Admitting Diagnoses:   Severe sepsis ([facility] ) [A41 9, R65 20]    Past Medical History:   Diagnosis Date    A-fib ([facility] )     [facility]ushing's disease ([facility]     Diabetes mellitus ([facility]     Hypertension     Kidney stones     Migraine     Renal disorder     Rotator cuff injury 01/2017    bilat       Past Surgical History:   Procedure Laterality Date    HYSTERECTOMY         Imaging Studies:  MRI brain w wo contrast   Final Result by Zeny Beard MD (10/21 7345)         1  Mild, chronic microangiopathy  2   No acute infarction, intracranial hemorrhage or mass  3   Scattered mild to moderate sinusitis        Workstation performed: [id number]            10/23/18 1600   Note Type   Note type Eval only   Pain Assessment   Pain Score No Pain   Home Living   Type of [address] Two level;Stairs to enter with rails  (12-14steps to 2nd level; 2STE)   Home Equipment Other (Comment)  (none)   Prior Function   Level of [place] Independent with ADLs and functional mobility  (w/o AD)   Lives With Daughter  (who works)   Veronica in the last 6 months 1 to 4  (1x)   Vocational Full time employment   Comments (+) driving   Restrictions/Precautions   [facility] Bearing Precautions Per Order No   Other Precautions Multiple lines;Telemetry;O2;Fall Risk   General   Family/Caregiver Present Yes  (daughters)   Cognition   Arousal/Participation Alert   Orientation Level Oriented to person;Oriented to place;Oriented to time   Following Commands Follows one step commands without difficulty   RUE Assessment   RUE Assessment WFL   RUE Strength   RUE Overall Strength Within Functional Limits - able to perform ADL tasks with strength   LUE Assessment   LUE Assessment WFL   LUE Strength   LUE Overall Strength Within Functional Limits - able to perform ADL tasks with strength   RLE Assessment   RLE Assessment WFL   Strength RLE   RLE Overall Strength 4+/5   LLE Assessment   LLE Assessment WFL   Strength LLE   LLE Overall Strength 4+/5   Coordination   Movements are Fluid and Coordinated 1   Sensation WFL   Bed Mobility   Supine to Sit 5  Supervision   Additional items HOB elevated; Bedrails; Increased time required;Verbal cues   Sit to Supine 5  Supervision   Additional items Increased time required;Verbal cues   Transfers   Sit to Stand 5  Supervision   Additional items Bedrails; Increased time required;Verbal cues   Stand to Sit 5  Supervision   Additional items Armrests; Increased time required;Verbal cues   Additional Comments cues for techniques   Ambulation/Elevation   Gait pattern Decreased foot clearance; Short stride; Excessively slow   Gait Assistance 4  Minimal assist   Additional items Assist x 1;Verbal cues; Tactile cues   Assistive Device None   Distance 240'x1   Stair Management Assistance Not tested   Balance   Static Sitting Normal   Static Standing Fair +   Ambulatory Fair -   Endurance Deficit   Endurance Deficit No   Activity Tolerance   Activity Tolerance Patient tolerated treatment well   Nurse Made Aware gill   Assessment   Prognosis Good   Problem List Decreased strength;Decreased endurance; Impaired balance;Decreased mobility;Obesity   Assessment Pt  68 y  o female transferred from [facility] for Acute encephalopathy w/ Severe sepsis (HCC) A41 9, R65 20  Pt admitted for aseptic mengitis/encephalitis likely viral  S/p lumbar puncture on 10/20/18  CT head negative for acute abnormality  MRI negative for acute abnormality  Pt referred to PT for mobility assessment & D/C planning   No official activity orders in chart but MARIA FERNANDA Aldana cleared pt for OOB as tolerated & reported that pt has been ambulating in unit w/ Nsg staff  Activity Orders request in place, MD to f/u  PTA, pt reports being I w/o AD + still works & drives  On eval, pt demonstrate dec mobility, balance, endurance & amb  Pt require S for bed mobility & transfers however require minAx1 for amb w/o AD + cues for techniques  Gait deviations as above, slow w/ dec foot clearance + noted slight LOBs x2 but pt able to catch self + minAx1  No SOB & dizziness reported t/o session  However noted dec O2 sat w/ 1li O2 during amb but resolved w/ rest  O2 sats as follows: 91% w/ O2; 87% w/ O2 during amb; 92% w/ O2 after resting & breathing techniques  Nsg staff most recent vital signs as follows: /81 (BP Location: Left arm)   Pulse 71   Temp 98 2 °F (36 8 °C) (Temporal)   Resp 18   SpO2 97%   At end of session, pt back in bed w/o issues, call bell & phone in reach  Will continue skilled PT to improve function & safety  At this time, will anticipate good progress in PT for safe D/C to home  Will recommend either HHPT or OPPT & family support at D/C, pending progress  Pt will need to achieve PT goals prior to D/C for safe return to home  CM to follow  Nsg staff to continue to mobilized pt (OOB in chair for all meals & ambulate in room/unit) as tolerated to prevent further decline in function  Nsg notified      Barriers to Discharge Inaccessible home environment;Decreased caregiver support   Goals   Patient Goals to get better   STG Expiration Date 10/30/18   Short Term Goal #1 Goals to be met in 7 days; pt will be able to: 1) inc strength & balance by 1/2 grade to improve overall functional mobility & dec fall risk; 2) inc bed mobility to I for pt to be able to get in/OOB safely w/ proper techniques 100% of the time, to dec caregiver assistance & safely function at home; 3) inc transfers to I for pt to transition safely from one surface to another w/o % of the time, to dec caregiver assistance & safely function at home; 4) inc amb w/ appropriate AD approx  >500' w/ I/modified I for pt to ambulate community distances w/o any % of the time, to dec caregiver assistance & safely function at home; 5) inc barthel score to 85 to decrease overall risk for falls; 6) negotiate stairs w/ S for inc safety during stair mgt inside/outside of home & dec caregiver assistance; 7) pt/caregiver ed   Treatment Day 0   Plan   Treatment/Interventions Functional transfer training;LE strengthening/ROM; Elevations; Therapeutic exercise; Endurance training;Patient/family training;Bed mobility;Gait training;Spoke to nursing;Family   PT Frequency Other (Comment)  (4-5x/wk)   Recommendation   Recommendation Home PT; Home with family support  (pending progress)   Equipment Recommended Other (Comment)  (offered SPC but pt declined)   PT - OK to Discharge No  (pt to achieve PT goals prior to D/C home)   Barthel Index   Feeding 10   Bathing 0   Grooming Score 5   Dressing Score 5   Bladder Score 10   Bowels Score 10   Toilet Use Score 5   Transfers (Bed/Chair) Score 10   Mobility (Level Surface) Score 10   Stairs Score 0   Barthel Index Score 65   Hx/personal factors: co-morbidities, inaccessible home, dec caregiver support, advanced age, mutliple lines, h/o of falls, fall risk, obesity and O2  Examination: dec mobility, dec balance, dec endurance, dec amb, moderate fall risk  Clinical: unpredictable (ongoing medical status, abnormal lab values and moderate fall risk)  Complexity: high     Verle Collie, PT

## 2018-10-23 NOTE — PLAN OF CARE
Problem: PHYSICAL THERAPY ADULT  Goal: Performs mobility at highest level of function for planned discharge setting  See evaluation for individualized goals  Treatment/Interventions: Functional transfer training, LE strengthening/ROM, Elevations, Therapeutic exercise, Endurance training, Patient/family training, Bed mobility, Gait training, Spoke to nursing, Family  Equipment Recommended: Other (Comment) (offered SPC but pt declined)       See flowsheet documentation for full assessment, interventions and recommendations  Outcome: Progressing  Prognosis: Good  Problem List: Decreased strength, Decreased endurance, Impaired balance, Decreased mobility, Obesity  Assessment: Pt  68 y  o female transferred from [facility] for Acute encephalopathy w/ Severe sepsis (HCC) A41 9, R65 20  Pt admitted for aseptic mengitis/encephalitis likely viral  S/p lumbar puncture on 10/20/18  CT head negative for acute abnormality  MRI negative for acute abnormality  Pt referred to PT for mobility assessment & D/C planning  No official activity orders in chart but MARIA FERNANDA Unger cleared pt for OOB as tolerated & reported that pt has been ambulating in unit w/ Nsg staff  Activity Orders request in place, MD to f/u  PTA, pt reports being I w/o AD + still works & drives  On eval, pt demonstrate dec mobility, balance, endurance & amb  Pt require S for bed mobility & transfers however require minAx1 for amb w/o AD + cues for techniques  Gait deviations as above, slow w/ dec foot clearance + noted slight LOBs x2 but pt able to catch self + minAx1  No SOB & dizziness reported t/o session  However noted dec O2 sat w/ 1li O2 during amb but resolved w/ rest  O2 sats as follows: 91% w/ O2; 87% w/ O2 during amb; 92% w/ O2 after resting & breathing techniques  Nsg staff most recent vital signs as follows: /81 (BP Location: Left arm)   Pulse 71   Temp 98 2 °F (36 8 °C) (Temporal)   Resp 18   SpO2 97%    At end of session, pt back in bed w/o issues, call bell & phone in reach  Will continue skilled PT to improve function & safety  At this time, will anticipate good progress in PT for safe D/C to home  Will recommend either HHPT or OPPT & family support at D/C, pending progress  Pt will need to achieve PT goals prior to D/C for safe return to home  CM to follow  Nsg staff to continue to mobilized pt (OOB in chair for all meals & ambulate in room/unit) as tolerated to prevent further decline in function  Nsg notified  Barriers to Discharge: Inaccessible home environment, Decreased caregiver support     Recommendation: Home PT, Home with family support (pending progress)     PT - OK to Discharge: No (pt to achieve PT goals prior to D/C home)    See flowsheet documentation for full assessment

## 2018-10-23 NOTE — PROGRESS NOTES
[facility] Progress Note  Patient: Lucy Oconnell 68 y o  female   MRN: [id number]  PCP: Joselin Hou  Unit/Bed#: [address] 227-01 Encounter: [id number]  Date Of Visit: 10/23/18      Assessment/plan  1  Severe sepsis poa due to aseptic meningitis- appreciate ID recommendations  There was concern for bacterial infection as one of the blood cultures was positive for gram positive bacteremia that ended up being lactococcus which is a skin contaminant  Due to this she was treated originally with vanco/cefepime and acyclovir  Vanco/cefepime have been d/kush she was continued on acyclovir  Pt is s/p LP on 10/20  LP results are negative  Please see number 2       2  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  Appreciate neurology recommendations and ID  Possibly HSV but LP was negative  Question if sample was obtained too early  Question if should repeat LP  Was present as ID spoke with patient about this  Will continue acyclovir for now and rediscuss with pt tomorrow about repeat of LP       3  Cushing disease- continue to hold mifepristone  Pt does not wish to take this medication any further       4  htn- continue diltiazem  Continue to hold arb while on acyclovir  Continue prn IV antihypertensives     5  Hypokalemia- continue replacement       6  Type 2 diabetes- a1c is 5 4  Continue insulin sliding scale  Pt is on metformin at home  7  hypomg- replaced     dispo- await physical therapy eval      Subjective:   Pt seen and examined  Pt is doing very well today  She has a mild headache  No f/c no cp no sob no n/v/d no abd pain  No confusion  Objective:     Vitals: Blood pressure (!) 176/78, pulse 62, temperature 97 5 °F (36 4 °C), temperature source Temporal, resp  rate 19, SpO2 97 %  ,There is no height or weight on file to calculate BMI      Lab, Imaging and other studies:    Results from last 7 days  Lab Units 10/23/18  0526  10/19/18  1131   WBC Thousand/uL 10 15  < > 12 90* HEMOGLOBIN g/dL 11 3*  < > 12 9   HEMATOCRIT % 33 3*  < > 39 0   PLATELETS Thousands/uL 211  < > 227   INR   --   --  1 19   < > = values in this interval not displayed  Results from last 7 days  Lab Units 10/23/18  0526 10/22/18  0508   SODIUM mmol/L 142 143   POTASSIUM mmol/L 3 1* 2 8*   CHLORIDE mmol/L 104 105   CO2 mmol/L 31 31   BUN mg/dL 5 5   CREATININE mg/dL 0 92 0 87   CALCIUM mg/dL 9 8 9 3   ALK PHOS U/L  --  31*   ALT U/L  --  12   AST U/L  --  10       Results from last 7 days  Lab Units 10/20/18  0603 10/19/18  1830 10/19/18  1509   TROPONIN I ng/mL 0 03 0 05* 0 04*     Lab Results   Component Value Date    BLOODCX No Growth at 72 hrs   10/19/2018    BLOODCX Lactococcus garvieae (A) 10/19/2018    BLOODCX Staphylococcus coagulase negative (A) 10/19/2018    BLOODCX Gram Negative Mo (A) 10/19/2018    URINECX 10,000-19,000 cfu/ml  10/19/2018    URINECX <1000 cfu/ml Mixed Contaminants X2 01/29/2017       Scheduled Meds:   Current Facility-Administered Medications:  acetaminophen 650 mg Oral Q6H PRN Nadiya Ortiz MD    acyclovir 10 mg/kg (Ideal) Intravenous Q8H Dayna Solis DO Last Rate: 525 mg (10/23/18 0535)   diltiazem 240 mg Oral Daily Nadiya Ortiz MD    enoxaparin 40 mg Subcutaneous Q24H [facility] Nadiya Ortiz MD    insulin lispro 1-5 Units Subcutaneous TID [facility] Nadiya Ortiz MD    labetalol 10 mg Intravenous Q6H PRN Nadiya Ortiz MD    ondansetron 4 mg Intravenous Q4H PRN Nadiya Ortiz MD    potassium chloride 20 mEq Intravenous Q2H Sammie Yung DO Last Rate: 20 mEq (10/23/18 1053)   sodium chloride 100 mL/hr Intravenous Continuous Nadiya Ortiz MD Last Rate: 100 mL/hr (10/23/18 0000)   thiamine 100 mg Oral Daily Nadiya Ortiz MD      Continuous Infusions:   sodium chloride 100 mL/hr Last Rate: 100 mL/hr (10/23/18 0000)     PRN Meds:   acetaminophen    labetalol    ondansetron      Physical exam:  Physical Exam  General appearance: alert and oriented, in no acute distress  Head: Normocephalic, without obvious abnormality, atraumatic  Eyes: conjunctivae/corneas clear  PERRL, EOM's intact  Fundi benign    Neck: no adenopathy, no carotid bruit, no JVD, supple, symmetrical, trachea midline and thyroid not enlarged, symmetric, no tenderness/mass/nodules  Lungs: clear to auscultation bilaterally  Heart: regular rate and rhythm, S1, S2 normal, no murmur, click, rub or gallop  Abdomen: soft, non-tender; bowel sounds normal; no masses,  no organomegaly  Extremities: extremities normal, warm and well-perfused; no cyanosis, clubbing, or edema  Pulses: 2+ and symmetric  Skin: Skin color, texture, turgor normal  No rashes or lesions  Neurologic: Grossly normal      VTE Pharmacologic Prophylaxis: Enoxaparin (Lovenox)  VTE Mechanical Prophylaxis: sequential compression device    Counseling / Coordination of Care  Total floor / unit time spent today 20 minutes    Current Length of Stay: 3 day(s)    Current Patient Status: Inpatient       Code Status: Level 1 - Full Code

## 2018-10-23 NOTE — UTILIZATION REVIEW
Continued Stay Review    Date:  10/23/2018    Vital Signs: /81 (BP Location: Left arm)   Pulse 71   Temp 98 2 °F (36 8 °C) (Temporal)   Resp 18   SpO2 97%      Medications:   Scheduled Meds:   Current Facility-Administered Medications:          acyclovir 10 mg/kg (Ideal) Intravenous Q8H     diltiazem 240 mg Oral Daily     enoxaparin 40 mg Subcutaneous Q24H MARQUEZ     insulin lispro 1-5 Units Subcutaneous TID AC                             thiamine 100 mg Oral Daily       Continuous Infusions:   sodium chloride 100 mL/hr Last Rate: 100 mL/hr (10/23/18 0000)     PRN Meds:   Acetaminophen x 1    labetalol    ondansetron    Abnormal Labs/Diagnostic Results:   H&H 11 3 / 33 3  K 3 1    Age/Sex: 68 y o  female     C/O mild Headache today    Assessment/Plan:  69 yo female admitted with Severe sepsis poa due to aseptic meningitis- appreciate ID recommendations  There was concern for bacterial infection as one of the blood cultures was positive for gram positive bacteremia that ended up being lactococcus which is a skin contaminant  Due to this she was treated originally with vanco/cefepime and acyclovir  Vanco/cefepime have been d/kush she was continued on acyclovir  Pt is s/p LP on 10/20  LP results are negative  Please see number 2    2  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  Possibly HSV but LP was negative  Question if sample was obtained too early  Question if should repeat LP  Was present as ID spoke with patient about this  Will continue acyclovir for now and rediscuss with pt tomorrow about repeat of LP  Continue to hold arb while on acyclovir  Continue prn IV antihypertensives  Replete K  Continue insulin sliding scale     KCL 20 IV X 2 doses today for K 3 1  On O2  @ 2 liters NC    Discharge Plan: To be determined  PT Eval pending      Thank you,  [address] Utilization Review Department  Phone: [phone];  Fax [phone]  ATTENTION: Please call with any questions or concerns to [phone]  and carefully follow the prompts so that you are directed to the right person  Send all requests for admission clinical reviews, approved or denied determinations and any other requests to fax [phone]   All voicemails are confidential

## 2018-10-24 ENCOUNTER — APPOINTMENT (INPATIENT)
Dept: MRI IMAGING | Facility: HOSPITAL | Age: 73
DRG: 871 | End: 2018-10-24
Payer: COMMERCIAL

## 2018-10-24 ENCOUNTER — APPOINTMENT (INPATIENT)
Dept: NEUROLOGY | Facility: HOSPITAL | Age: 73
DRG: 871 | End: 2018-10-24
Payer: COMMERCIAL

## 2018-10-24 ENCOUNTER — APPOINTMENT (INPATIENT)
Dept: RADIOLOGY | Facility: HOSPITAL | Age: 73
DRG: 871 | End: 2018-10-24
Payer: COMMERCIAL

## 2018-10-24 LAB
ANION GAP SERPL CALCULATED.3IONS-SCNC: 7 MMOL/L (ref 4–13)
BACTERIA BLD CULT: NORMAL
BUN SERPL-MCNC: 5 MG/DL (ref 5–25)
CALCIUM SERPL-MCNC: 9.4 MG/DL (ref 8.3–10.1)
CHLORIDE SERPL-SCNC: 103 MMOL/L (ref 100–108)
CO2 SERPL-SCNC: 32 MMOL/L (ref 21–32)
CREAT SERPL-MCNC: 0.87 MG/DL (ref 0.6–1.3)
GFR SERPL CREATININE-BSD FRML MDRD: 67 ML/MIN/1.73SQ M
GLUCOSE SERPL-MCNC: 104 MG/DL (ref 65–140)
GLUCOSE SERPL-MCNC: 128 MG/DL (ref 65–140)
GLUCOSE SERPL-MCNC: 148 MG/DL (ref 65–140)
GLUCOSE SERPL-MCNC: 99 MG/DL (ref 65–140)
MAGNESIUM SERPL-MCNC: 1.6 MG/DL (ref 1.6–2.6)
POTASSIUM SERPL-SCNC: 2.6 MMOL/L (ref 3.5–5.3)
SODIUM SERPL-SCNC: 142 MMOL/L (ref 136–145)

## 2018-10-24 PROCEDURE — A9585 GADOBUTROL INJECTION: HCPCS | Performed by: INTERNAL MEDICINE

## 2018-10-24 PROCEDURE — 99232 SBSQ HOSP IP/OBS MODERATE 35: CPT | Performed by: PSYCHIATRY & NEUROLOGY

## 2018-10-24 PROCEDURE — 99232 SBSQ HOSP IP/OBS MODERATE 35: CPT | Performed by: INTERNAL MEDICINE

## 2018-10-24 PROCEDURE — 83735 ASSAY OF MAGNESIUM: CPT | Performed by: INTERNAL MEDICINE

## 2018-10-24 PROCEDURE — 80048 BASIC METABOLIC PNL TOTAL CA: CPT | Performed by: INTERNAL MEDICINE

## 2018-10-24 PROCEDURE — 71046 X-RAY EXAM CHEST 2 VIEWS: CPT

## 2018-10-24 PROCEDURE — 82948 REAGENT STRIP/BLOOD GLUCOSE: CPT

## 2018-10-24 PROCEDURE — 95816 EEG AWAKE AND DROWSY: CPT

## 2018-10-24 PROCEDURE — 99233 SBSQ HOSP IP/OBS HIGH 50: CPT | Performed by: INTERNAL MEDICINE

## 2018-10-24 PROCEDURE — 70553 MRI BRAIN STEM W/O & W/DYE: CPT

## 2018-10-24 PROCEDURE — 95816 EEG AWAKE AND DROWSY: CPT | Performed by: PSYCHIATRY & NEUROLOGY

## 2018-10-24 RX ORDER — POTASSIUM CHLORIDE 20MEQ/15ML
40 LIQUID (ML) ORAL ONCE
Status: COMPLETED | OUTPATIENT
Start: 2018-10-24 | End: 2018-10-24

## 2018-10-24 RX ORDER — POTASSIUM CHLORIDE 14.9 MG/ML
20 INJECTION INTRAVENOUS ONCE
Status: COMPLETED | OUTPATIENT
Start: 2018-10-24 | End: 2018-10-24

## 2018-10-24 RX ORDER — POTASSIUM CHLORIDE 20 MEQ/1
40 TABLET, EXTENDED RELEASE ORAL 2 TIMES DAILY
Status: DISCONTINUED | OUTPATIENT
Start: 2018-10-24 | End: 2018-10-27

## 2018-10-24 RX ORDER — MAGNESIUM SULFATE HEPTAHYDRATE 40 MG/ML
2 INJECTION, SOLUTION INTRAVENOUS ONCE
Status: COMPLETED | OUTPATIENT
Start: 2018-10-24 | End: 2018-10-24

## 2018-10-24 RX ORDER — POTASSIUM CHLORIDE 20 MEQ/1
40 TABLET, EXTENDED RELEASE ORAL ONCE
Status: DISCONTINUED | OUTPATIENT
Start: 2018-10-24 | End: 2018-10-24

## 2018-10-24 RX ADMIN — LABETALOL 20 MG/4 ML (5 MG/ML) INTRAVENOUS SYRINGE 10 MG: at 23:34

## 2018-10-24 RX ADMIN — DILTIAZEM HYDROCHLORIDE 240 MG: 240 CAPSULE, COATED, EXTENDED RELEASE ORAL at 08:46

## 2018-10-24 RX ADMIN — GADOBUTROL 8 ML: 604.72 INJECTION INTRAVENOUS at 22:55

## 2018-10-24 RX ADMIN — ACYCLOVIR SODIUM 525 MG: 50 INJECTION, SOLUTION INTRAVENOUS at 21:47

## 2018-10-24 RX ADMIN — ENOXAPARIN SODIUM 40 MG: 40 INJECTION SUBCUTANEOUS at 08:46

## 2018-10-24 RX ADMIN — ACYCLOVIR SODIUM 525 MG: 50 INJECTION, SOLUTION INTRAVENOUS at 05:38

## 2018-10-24 RX ADMIN — POTASSIUM CHLORIDE 20 MEQ: 200 INJECTION, SOLUTION INTRAVENOUS at 07:32

## 2018-10-24 RX ADMIN — POTASSIUM CHLORIDE 40 MEQ: 1500 TABLET, EXTENDED RELEASE ORAL at 17:43

## 2018-10-24 RX ADMIN — Medication 100 MG: at 08:46

## 2018-10-24 RX ADMIN — POTASSIUM CHLORIDE 40 MEQ: 20 SOLUTION ORAL at 07:27

## 2018-10-24 RX ADMIN — ACYCLOVIR SODIUM 525 MG: 50 INJECTION, SOLUTION INTRAVENOUS at 13:51

## 2018-10-24 RX ADMIN — LABETALOL 20 MG/4 ML (5 MG/ML) INTRAVENOUS SYRINGE 10 MG: at 00:30

## 2018-10-24 RX ADMIN — SODIUM CHLORIDE 100 ML/HR: 0.9 INJECTION, SOLUTION INTRAVENOUS at 21:47

## 2018-10-24 RX ADMIN — SODIUM CHLORIDE 100 ML/HR: 0.9 INJECTION, SOLUTION INTRAVENOUS at 10:50

## 2018-10-24 RX ADMIN — MAGNESIUM SULFATE IN WATER 2 G: 40 INJECTION, SOLUTION INTRAVENOUS at 12:05

## 2018-10-24 NOTE — PROGRESS NOTES
[facility] Progress Note  Patient: Jhoan Gilliland 68 y o  female   MRN: [id number]  PCP: Raghu Melvin  Unit/Bed#: [facility] [address] 227[address]01 Encounter: [id number]  Date Of Visit: 10/24/18      Assessment/plan  1  Severe sepsis poa due to aseptic meningitis- appreciate ID recommendations  There was concern for bacterial infection as one of the blood cultures was positive for gram positive bacteremia that ended up being lactococcus which is a skin contaminant  Due to this she was treated originally with vanco/cefepime and acyclovir  Vanco/cefepime have been d/kush she was continued on acyclovir  Pt is s/p LP on 10/20  LP results are negative  Please see number 2       2  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  Appreciate neurology recommendations and ID  Possibly HSV but LP was negative  Question if sample was obtained too early  Question if should repeat LP  Pt now admits to hallucinations  Favor repeating LP  Awaiting to see if any further studies would be warranted  Hallucinations could be related to acyclovir  LP would help us determine if we could d/c acyclovir  Neurology will re eval pt today       3  Cushing disease- continue to hold mifepristone  Pt does not wish to take this medication any further       4  htn- continue diltiazem  Continue to hold arb while on acyclovir  Continue prn IV antihypertensives     5  Hypokalemia- continue replacement  Check bmp in am       6  Type 2 diabetes- a1c is 5 4  Continue insulin sliding scale  Pt is on metformin at home       7  hypomg- will write for magnesium again  Check mg level  8  Dyspnea- could be atelectasis  Will check cxr       dispo- appreciate physical therapy recommendations  Home health physical therapy vrs home pt    Subjective:   Pt seen and examined  Pt admits that she is having hallucinations today  She states they have been present since she has been here  She sees writing on the wall at times   She sees two of the tv and sometimes flashes next to the tv  She states it does not change if she closes one eye  She states she still has a headache  No neck stiffness  No f/c no cp she is still sob at times  No n/v/d no abd pain  Objective:     Vitals: Blood pressure 153/83, pulse 67, temperature 97 9 °F (36 6 °C), temperature source Temporal, resp  rate 18, SpO2 97 %  ,There is no height or weight on file to calculate BMI  Lab, Imaging and other studies:    Results from last 7 days  Lab Units 10/23/18  0526  10/19/18  1131   WBC Thousand/uL 10 15  < > 12 90*   HEMOGLOBIN g/dL 11 3*  < > 12 9   HEMATOCRIT % 33 3*  < > 39 0   PLATELETS Thousands/uL 211  < > 227   INR   --   --  1 19   < > = values in this interval not displayed  Results from last 7 days  Lab Units 10/24/18  0442  10/22/18  0508   SODIUM mmol/L 142  < > 143   POTASSIUM mmol/L 2 6*  < > 2 8*   CHLORIDE mmol/L 103  < > 105   CO2 mmol/L 32  < > 31   BUN mg/dL 5  < > 5   CREATININE mg/dL 0 87  < > 0 87   CALCIUM mg/dL 9 4  < > 9 3   ALK PHOS U/L  --   --  31*   ALT U/L  --   --  12   AST U/L  --   --  10   < > = values in this interval not displayed  Results from last 7 days  Lab Units 10/20/18  0603 10/19/18  1830 10/19/18  1509   TROPONIN I ng/mL 0 03 0 05* 0 04*     Lab Results   Component Value Date    BLOODCX No Growth After 4 Days   10/19/2018    BLOODCX Lactococcus garvieae (A) 10/19/2018    BLOODCX Staphylococcus coagulase negative (A) 10/19/2018    BLOODCX Pantoea agglomerans (A) 10/19/2018    URINECX 10,000-19,000 cfu/ml  10/19/2018    URINECX <1000 cfu/ml Mixed Contaminants X2 01/29/2017     Scheduled Meds:   Current Facility-Administered Medications:  acetaminophen 650 mg Oral Q6H PRN Nia Multani MD    acyclovir 10 mg/kg (Ideal) Intravenous Q8H Dayna Solis DO Last Rate: 525 mg (10/24/18 0538)   diltiazem 240 mg Oral Daily Nia Multani MD    enoxaparin 40 mg Subcutaneous Q24H [address] Nia Multani MD    insulin lispro 1-5 Units Subcutaneous TID [facility] Nia Multani MD labetalol 10 mg Intravenous Q6H PRN Wilma Masters MD    magnesium sulfate 2 g Intravenous Once Sammie Yung DO    ondansetron 4 mg Intravenous Q4H PRN Wilma Masters MD    potassium chloride 40 mEq Oral BID Sammie Yung DO    sodium chloride 100 mL/hr Intravenous Continuous Wilma Masters MD Last Rate: 100 mL/hr (10/24/18 1050)   thiamine 100 mg Oral Daily Wilma Masters MD      Continuous Infusions:   sodium chloride 100 mL/hr Last Rate: 100 mL/hr (10/24/18 1050)     PRN Meds:   acetaminophen    labetalol    ondansetron      Physical exam:  Physical Exam  General appearance: alert and oriented, in no acute distress  Head: Normocephalic, without obvious abnormality, atraumatic  Eyes: conjunctivae/corneas clear  PERRL, EOM's intact  Fundi benign    Neck: no adenopathy, no carotid bruit, no JVD, supple, symmetrical, trachea midline and thyroid not enlarged, symmetric, no tenderness/mass/nodules  Lungs: clear to auscultation bilaterally  Heart: regular rate and rhythm, S1, S2 normal, no murmur, click, rub or gallop  Abdomen: soft, non-tender; bowel sounds normal; no masses,  no organomegaly  Extremities: extremities normal, warm and well-perfused; no cyanosis, clubbing, or edema  Pulses: 2+ and symmetric  Skin: Skin color, texture, turgor normal  No rashes or lesions  Neurologic: Grossly normal      VTE Pharmacologic Prophylaxis: Enoxaparin (Lovenox)  VTE Mechanical Prophylaxis: sequential compression device    Counseling / Coordination of Care  Total floor / unit time spent today 20 minutes     Current Length of Stay: 4 day(s)    Current Patient Status: Inpatient       Code Status: Level 1 - Full Code

## 2018-10-24 NOTE — PROGRESS NOTES
Progress Note - Infectious Disease   Willis Mercer 68 y o  female MRN: [id number]  Unit/Bed#: [facility] [address] 227[address]01 Encounter: [id number]      mpression/Recommendations:    1  Severe sepsis, present on admission to [facility] [address]  on 10/19   With reported fever of 104, leukocytosis, lactic acidosis  May be secondary to primary CNS process, as stated below   Bacteremia is also a possibility, although less likely  Doubt UTI as the cause given severity of encephalopathy  Patient was started empirically on vancomycin and cefepime   Leukocytosis and lactic acidosis has improved   No fevers here  Fortunately, remains hemodynamically stable, nontoxic      -  Antibiotic plan as below  - monitor temperatures and hemodynamics  - check CBC in a m   -  Supportive care     2  Aseptic meningitis/encephalitis   Lumbar puncture revealed 62 WBCs   Glucose was 71, protein 72  Doubt bacterial meningitis based on patient's clinical presentation, LP findings   CSF gram stain and culture are negative   Consideration for viral meningitis/encephalitis   Also lower suspicion for HSV encephalitis as MRI does not show any temporal lobe enhancement   We can also see CSF pleocytosis with seizures  HSV PCR now negative  Based on negative MRI findings and CSF PCR, I have a very low suspicion for HSV encephalitis  However, may be false negative in the acute setting  We can consider repeating LP and rechecking for a late positive HSV PCR     -continue IV acyclovir for now  -hold additional antibiotics  -follow-up CSF for West Nile virus  -recommend repeat LP, which the patient is agreeable to  Would send for repeat HSV PCR, cell count, glucose, protein, comprehensive CSF PCR       3  Acute encephalopathy   Consideration for viral encephalitis based on patient's presentation  Doubt bacterial meningitis as stated above    CT head was negative   Also consideration for seizure activity   MRI with no focal findings   Mental status continues to improve every day  4  Visual hallucinations  Unclear etiology  May be related to aseptic meningitis  Also potential side effect of IV acyclovir, although difficult to pinpoint when these symptoms started  Consideration for noninfectious etiology altogether as we never definitively proved her presentation was all related to CNS infection  -recommend repeat LP, as stated above   -follow-up further recommendations by Neurology     5  Gram-positive bacteremia   So far, 1 of 2 blood cultures drawn at [facility] positive for lactococcus   This is likely a skin contaminant   The other blood culture remains negative      -continue to follow up blood culture data  -no additional antibiotic for this      6  Cushings disease   Patient was recently started on Korlym about 1 month ago  This has been held      Antibiotics:  Acyclovir IV D5     Discussed with patient and her daughter at bedside, and with Dr Ajit Lopez and Dr Santino Hairston        Subjective: Today, patient states she has been having episodes of visual hallucinations and double vision  She is seeing writing on the wall  She also sees flashes of light  Continues to have a mild frontal headache  No neck pain or stiffness  No fevers, chills  She was ambulating down the [place] without difficulty  Tolerating oral intake      Objective:  Vitals:  Temp:  [97 9 °F (36 6 °C)-98 3 °F (36 8 °C)] 97 9 °F (36 6 °C)  HR:  [67-74] 67  Resp:  [18] 18  BP: (148-192)/(78-95) 153/83  SpO2:  [97 %] 97 %  Temp (24hrs), Av 1 °F (36 7 °C), Min:97 9 °F (36 6 °C), Max:98 3 °F (36 8 °C)  Current: Temperature: 97 9 °F (36 6 °C)    Physical Exam:   General:  Awake, alert  Eyes:  Conjunctive clear with no hemorrhages or effusions  Oropharynx:  No ulcers, no lesions  Neck:  Supple, no lymphadenopathy  Lungs:  Clear to auscultation bilaterally, no accessory muscle use  Cardiac:  Regular rate and rhythm, no murmurs  Abdomen:  Soft, non-tender, non-distented  Extremities:  No peripheral cyanosis, clubbing, or edema  Skin:  No rashes, no ulcers  Neurological:  Moves all four extremities spontaneously, no meningeal signs    Lab Results:  I have personally reviewed pertinent labs  Results from last 7 days  Lab Units 10/24/18  0442 10/23/18  0526 10/22/18  0508  10/20/18  0603  10/19/18  1131   SODIUM mmol/L 142 142 143  < > 139  < > 140   POTASSIUM mmol/L 2 6* 3 1* 2 8*  < > 3 0*  < > 2 6*   CHLORIDE mmol/L 103 104 105  < > 103  < > 97*   CO2 mmol/L 32 31 31  < > 28  < > 32*   BUN mg/dL 5 5 5  < > 9  < > 9   CREATININE mg/dL 0 87 0 92 0 87  < > 0 89  < > 0 88   EGFR ml/min/1 73sq m 67 62 67  < > 65  < > 66   CALCIUM mg/dL 9 4 9 8 9 3  < > 9 7  < > 11 0*   AST U/L  --   --  10  --  13  --  15   ALT U/L  --   --  12  --  11  --  11   ALK PHOS U/L  --   --  31*  --  27*  --  33*   < > = values in this interval not displayed  Results from last 7 days  Lab Units 10/23/18  0526 10/22/18  0508 10/21/18  0532   WBC Thousand/uL 10 15 8 36 10 21*   HEMOGLOBIN g/dL 11 3* 10 1* 10 5*   PLATELETS Thousands/uL 211 159 171       Results from last 7 days  Lab Units 10/20/18  1457 10/19/18  1202 10/19/18  1131   BLOOD CULTURE   --   --  No Growth After 4 Days  Lactococcus garvieae*  Staphylococcus coagulase negative*  Pantoea agglomerans*   GRAM STAIN RESULT  4+ Polys  2+ Mononuclear Cells  No bacteria seen  --  Gram positive cocci in chains  Gram negative rods  Gram positive cocci in clusters   URINE CULTURE   --  10,000-19,000 cfu/ml   --        Imaging Studies:   I have personally reviewed pertinent imaging study reports and images in PACS  EKG, Pathology, and Other Studies:   I have personally reviewed pertinent reports

## 2018-10-24 NOTE — PROGRESS NOTES
Progress Note - Neurology   [facility] 68 y o  female MRN: [id number]  Unit/Bed#: [facility] [address] 227[address]01 Encounter: [id number]    Assessment/ Plan:  1)  Acute encephalopathy with aphasia and sepsis - suspicious for viral meningitis, no clear agent at this time  -MRI B on 10/21/18 demonstrates no acute intracranial abnormality including no abnormal FLAIR signal    -will repeat today    -Appreciate Infectious Disease     -Acyclovir Day 5    -repeat LP to be completed today or tomorrow    -CSF studies to date:    Abnormal: Protein (72), WBC (62, percent neutrophils, 1% lymphs, 11% mononucleocytes)    Normal: Glucose, Culture and gram stain, Comprehensive PCR     Pending: West Nile    -Serum studies:     Abnormal: Blood Cxs (1 of 2 positive for lactococcus - likely skin contaminate)  Normal: Procalcitonin, ammonia   -Continue supportive care   -Medical management per primary team   -Will continue to follow, please monitor exam and notify with changes     Subjective:   From Ronak Dotson PA-C's progress note on 10/21/18:  "[facility] is a 68 y o  female with a history of Afib on Aspirin and Plavix, HTN, DM2, Cushing's disease on Mifepristone who presented to [facility] on 10/19 with fever of 104, leukocytosis, lactic acidosis, acute onset confusion and aphasia  Patient was transferred to [facility] and started on broad-spectrum a empiric antibiotics and acyclovir  LP was completed on 10/20  Initial studies revealing a moderately elevated WBC"     On exam today, the pt reports that she has been experiencing visual hallucinations, in the form of "flashing lights, leaves in front of the TV, a woman's shadow on the wall, and writing on the wall that I couldn't read "  Additionally, the patient reports a severe by temporal headache last night, relieved spontaneously this morning  Does not report that it is worse when lying flat  No associated nausea, vomiting, photophobia or phonophobia    Remainder of review of systems is negative  Patient with improved cognition, readily able to complete complex calculations  Per patient's daughter, the patient has had arrest of speech at times and staring spells, not appreciated on exam      ROS:  See Subjective     Medications:  Scheduled Meds:    Current Facility-Administered Medications:  acetaminophen 650 mg Oral Q6H PRN Miley Huerta MD    acyclovir 10 mg/kg (Ideal) Intravenous Q8H Dayna Aldea, DO Last Rate: 525 mg (10/24/18 0538)   diltiazem 240 mg Oral Daily Miley Huerta MD    enoxaparin 40 mg Subcutaneous Q24H [address] Miley Huerta MD    insulin lispro 1-5 Units Subcutaneous TID [facility] Miley Huerta MD    labetalol 10 mg Intravenous Q6H PRN Miley Huerta MD    magnesium sulfate 2 g Intravenous Once Sammie Ambron, DO    ondansetron 4 mg Intravenous Q4H PRN Miley Huerta MD    potassium chloride 40 mEq Oral BID Sammie Ambron, DO    sodium chloride 100 mL/hr Intravenous Continuous Miley Huerta MD Last Rate: 100 mL/hr (10/24/18 1050)   thiamine 100 mg Oral Daily Miley Huerta MD      Continuous Infusions:    sodium chloride 100 mL/hr Last Rate: 100 mL/hr (10/24/18 1050)     PRN Meds:   acetaminophen    labetalol    ondansetron      Vitals: Blood pressure 153/83, pulse 67, temperature 97 9 °F (36 6 °C), temperature source Temporal, resp  rate 18, SpO2 97 %  ,There is no height or weight on file to calculate BMI  Physical Exam:   Physical Exam   Constitutional: She is oriented to person, place, and time  She appears well-developed and well-nourished  No distress  HENT:   Head: Normocephalic and atraumatic  Right Ear: External ear normal    Left Ear: External ear normal    Mouth/Throat: No oropharyngeal exudate  Eyes: Conjunctivae are normal  Right eye exhibits no discharge  Left eye exhibits no discharge  No scleral icterus  Neck: Normal range of motion  Neck supple  Pulmonary/Chest: Effort normal  No respiratory distress  Musculoskeletal: Normal range of motion   She exhibits no edema, tenderness or deformity  Neurological: She is oriented to person, place, and time  She has normal strength  Skin: Skin is warm and dry  No rash noted  She is not diaphoretic  No erythema  No pallor  Psychiatric: Her speech is normal    Appears anxious  Report visual hallucinations as detailed in subjective   Nursing note and vitals reviewed  Neurologic Exam     Mental Status   Oriented to person, place, and time  Follows 2 step commands  Attention: normal  Concentration: normal    Speech: speech is normal   Level of consciousness: alert  Knowledge: good  Able to perform simple calculations  Able to name object  Able to repeat  Normal comprehension  Cranial Nerves   Cranial nerves II through XII intact  Motor Exam   Muscle bulk: normal  Overall muscle tone: normal    Strength   Strength 5/5 throughout  Sensory Exam   Light touch normal        Lab, Imaging and other studies: I have personally reviewed pertinent reports       I have personally reviewed pertinent images in PACs  Recent Results (from the past 24 hour(s))   Fingerstick Glucose (POCT)    Collection Time: 10/23/18  4:19 PM   Result Value Ref Range    POC Glucose 115 65 - 140 mg/dl   Fingerstick Glucose (POCT)    Collection Time: 10/23/18  9:17 PM   Result Value Ref Range    POC Glucose 149 (H) 65 - 140 mg/dl   Basic metabolic panel    Collection Time: 10/24/18  4:42 AM   Result Value Ref Range    Sodium 142 136 - 145 mmol/L    Potassium 2 6 (LL) 3 5 - 5 3 mmol/L    Chloride 103 100 - 108 mmol/L    CO2 32 21 - 32 mmol/L    ANION GAP 7 4 - 13 mmol/L    BUN 5 5 - 25 mg/dL    Creatinine 0 87 0 60 - 1 30 mg/dL    Glucose 104 65 - 140 mg/dL    Calcium 9 4 8 3 - 10 1 mg/dL    eGFR 67 ml/min/1 73sq m   Fingerstick Glucose (POCT)    Collection Time: 10/24/18  7:03 AM   Result Value Ref Range    POC Glucose 99 65 - 140 mg/dl   Fingerstick Glucose (POCT)    Collection Time: 10/24/18 10:52 AM   Result Value Ref Range    POC Glucose 148 (H) 65 - 140 mg/dl   Magnesium    Collection Time: 10/24/18 11:43 AM   Result Value Ref Range    Magnesium 1 6 1 6 - 2 6 mg/dL   ]    VTE Prophylaxis: Sequential compression device (Venodyne)  and Enoxaparin (Lovenox)    Counseling / Coordination of Care  Total time spent today 35 minutes  Greater than 50% of total time was spent with the patient and / or family counseling and / or coordination of care  A description of the counseling / coordination of care: The patient was seen examined by myself the attending physician  The chart was reviewed thoroughly, including laboratory values and imaging studies  The patient was counseled in the room

## 2018-10-25 ENCOUNTER — APPOINTMENT (INPATIENT)
Dept: RADIOLOGY | Facility: HOSPITAL | Age: 73
DRG: 871 | End: 2018-10-25
Payer: COMMERCIAL

## 2018-10-25 LAB
ANION GAP SERPL CALCULATED.3IONS-SCNC: 9 MMOL/L (ref 4–13)
APPEARANCE CSF: NORMAL
BUN SERPL-MCNC: 4 MG/DL (ref 5–25)
CALCIUM SERPL-MCNC: 9.1 MG/DL (ref 8.3–10.1)
CHLORIDE SERPL-SCNC: 103 MMOL/L (ref 100–108)
CO2 SERPL-SCNC: 32 MMOL/L (ref 21–32)
CREAT SERPL-MCNC: 0.85 MG/DL (ref 0.6–1.3)
ERYTHROCYTE [DISTWIDTH] IN BLOOD BY AUTOMATED COUNT: 12.6 % (ref 11.6–15.1)
GFR SERPL CREATININE-BSD FRML MDRD: 69 ML/MIN/1.73SQ M
GLUCOSE CSF-MCNC: 66 MG/DL (ref 50–80)
GLUCOSE SERPL-MCNC: 106 MG/DL (ref 65–140)
GLUCOSE SERPL-MCNC: 117 MG/DL (ref 65–140)
GLUCOSE SERPL-MCNC: 129 MG/DL (ref 65–140)
GLUCOSE SERPL-MCNC: 145 MG/DL (ref 65–140)
GLUCOSE SERPL-MCNC: 191 MG/DL (ref 65–140)
HCT VFR BLD AUTO: 31.1 % (ref 34.8–46.1)
HGB BLD-MCNC: 10.6 G/DL (ref 11.5–15.4)
MAGNESIUM SERPL-MCNC: 1.9 MG/DL (ref 1.6–2.6)
MCH RBC QN AUTO: 30 PG (ref 26.8–34.3)
MCHC RBC AUTO-ENTMCNC: 34.1 G/DL (ref 31.4–37.4)
MCV RBC AUTO: 88 FL (ref 82–98)
PLATELET # BLD AUTO: 249 THOUSANDS/UL (ref 149–390)
PMV BLD AUTO: 10.6 FL (ref 8.9–12.7)
POTASSIUM SERPL-SCNC: 2.7 MMOL/L (ref 3.5–5.3)
PROT CSF-MCNC: 55 MG/DL (ref 15–45)
RBC # BLD AUTO: 3.53 MILLION/UL (ref 3.81–5.12)
RBC # CSF MANUAL: 365 UL (ref 0–10)
SODIUM SERPL-SCNC: 144 MMOL/L (ref 136–145)
TOTAL CELLS COUNTED BLD: NO
TUBE # CSF: 4
WBC # BLD AUTO: 8.28 THOUSAND/UL (ref 4.31–10.16)
WBC # CSF AUTO: 0 /UL (ref 0–5)

## 2018-10-25 PROCEDURE — 83735 ASSAY OF MAGNESIUM: CPT | Performed by: INTERNAL MEDICINE

## 2018-10-25 PROCEDURE — 84157 ASSAY OF PROTEIN OTHER: CPT | Performed by: PHYSICIAN ASSISTANT

## 2018-10-25 PROCEDURE — 77003 FLUOROGUIDE FOR SPINE INJECT: CPT

## 2018-10-25 PROCEDURE — 82948 REAGENT STRIP/BLOOD GLUCOSE: CPT

## 2018-10-25 PROCEDURE — 77003 FLUOROGUIDE FOR SPINE INJECT: CPT | Performed by: RADIOLOGY

## 2018-10-25 PROCEDURE — 87529 HSV DNA AMP PROBE: CPT | Performed by: PHYSICIAN ASSISTANT

## 2018-10-25 PROCEDURE — 62270 DX LMBR SPI PNXR: CPT | Performed by: RADIOLOGY

## 2018-10-25 PROCEDURE — 62270 DX LMBR SPI PNXR: CPT

## 2018-10-25 PROCEDURE — 009U3ZX DRAINAGE OF SPINAL CANAL, PERCUTANEOUS APPROACH, DIAGNOSTIC: ICD-10-PCS | Performed by: INTERNAL MEDICINE

## 2018-10-25 PROCEDURE — 99232 SBSQ HOSP IP/OBS MODERATE 35: CPT | Performed by: INTERNAL MEDICINE

## 2018-10-25 PROCEDURE — 99233 SBSQ HOSP IP/OBS HIGH 50: CPT | Performed by: INTERNAL MEDICINE

## 2018-10-25 PROCEDURE — 80048 BASIC METABOLIC PNL TOTAL CA: CPT | Performed by: INTERNAL MEDICINE

## 2018-10-25 PROCEDURE — 89051 BODY FLUID CELL COUNT: CPT | Performed by: PHYSICIAN ASSISTANT

## 2018-10-25 PROCEDURE — 89050 BODY FLUID CELL COUNT: CPT | Performed by: PHYSICIAN ASSISTANT

## 2018-10-25 PROCEDURE — 85027 COMPLETE CBC AUTOMATED: CPT | Performed by: INTERNAL MEDICINE

## 2018-10-25 PROCEDURE — 82945 GLUCOSE OTHER FLUID: CPT | Performed by: PHYSICIAN ASSISTANT

## 2018-10-25 RX ORDER — POTASSIUM CHLORIDE 14.9 MG/ML
20 INJECTION INTRAVENOUS ONCE
Status: COMPLETED | OUTPATIENT
Start: 2018-10-25 | End: 2018-10-25

## 2018-10-25 RX ORDER — LOSARTAN POTASSIUM 50 MG/1
50 TABLET ORAL DAILY
Status: DISCONTINUED | OUTPATIENT
Start: 2018-10-26 | End: 2018-10-26

## 2018-10-25 RX ORDER — LORAZEPAM 2 MG/ML
1 INJECTION INTRAMUSCULAR ONCE
Status: COMPLETED | OUTPATIENT
Start: 2018-10-25 | End: 2018-10-25

## 2018-10-25 RX ORDER — LANOLIN ALCOHOL/MO/W.PET/CERES
3 CREAM (GRAM) TOPICAL
Status: DISCONTINUED | OUTPATIENT
Start: 2018-10-25 | End: 2018-10-28 | Stop reason: HOSPADM

## 2018-10-25 RX ADMIN — LABETALOL 20 MG/4 ML (5 MG/ML) INTRAVENOUS SYRINGE 10 MG: at 12:05

## 2018-10-25 RX ADMIN — DILTIAZEM HYDROCHLORIDE 240 MG: 240 CAPSULE, COATED, EXTENDED RELEASE ORAL at 08:16

## 2018-10-25 RX ADMIN — ACYCLOVIR SODIUM 525 MG: 50 INJECTION, SOLUTION INTRAVENOUS at 21:18

## 2018-10-25 RX ADMIN — POTASSIUM CHLORIDE 40 MEQ: 1500 TABLET, EXTENDED RELEASE ORAL at 17:11

## 2018-10-25 RX ADMIN — ACYCLOVIR SODIUM 525 MG: 50 INJECTION, SOLUTION INTRAVENOUS at 14:06

## 2018-10-25 RX ADMIN — Medication 100 MG: at 08:16

## 2018-10-25 RX ADMIN — POTASSIUM CHLORIDE 20 MEQ: 200 INJECTION, SOLUTION INTRAVENOUS at 11:33

## 2018-10-25 RX ADMIN — ACYCLOVIR SODIUM 525 MG: 50 INJECTION, SOLUTION INTRAVENOUS at 05:43

## 2018-10-25 RX ADMIN — SODIUM CHLORIDE 100 ML/HR: 0.9 INJECTION, SOLUTION INTRAVENOUS at 11:31

## 2018-10-25 RX ADMIN — SODIUM CHLORIDE 75 ML/HR: 0.9 INJECTION, SOLUTION INTRAVENOUS at 21:24

## 2018-10-25 RX ADMIN — LORAZEPAM 1 MG: 2 INJECTION INTRAMUSCULAR; INTRAVENOUS at 10:01

## 2018-10-25 RX ADMIN — MELATONIN TAB 3 MG 3 MG: 3 TAB at 21:18

## 2018-10-25 RX ADMIN — POTASSIUM CHLORIDE 40 MEQ: 1500 TABLET, EXTENDED RELEASE ORAL at 08:16

## 2018-10-25 RX ADMIN — INSULIN LISPRO 1 UNITS: 100 INJECTION, SOLUTION INTRAVENOUS; SUBCUTANEOUS at 17:11

## 2018-10-25 NOTE — PROGRESS NOTES
Progress Note - Infectious Disease   Homa Cavazos 68 y o  female MRN: [id number]  Unit/Bed#: [facility]  2 [place] 227-01 Encounter: [id number]    mpression/Recommendations:    1  Severe sepsis, present on admission to [address] [place] [address] [address] [facility] on 10/19   With reported fever of 104, leukocytosis, lactic acidosis  May be secondary to primary CNS process, as stated below   Bacteremia is also a possibility, although less likely  Doubt UTI as the cause given severity of encephalopathy  Patient was started empirically on vancomycin and cefepime   Leukocytosis and lactic acidosis has improved   No fevers here  Fortunately, remains hemodynamically stable, nontoxic      -  Antibiotic plan as below  - monitor temperatures and hemodynamics  - check CBC in a m   -  Supportive care     2  Aseptic meningitis/encephalitis  Initial lumbar puncture revealed 62 WBCs   Glucose was 71, protein 72  Doubt bacterial meningitis based on patient's clinical presentation, LP findings   CSF gram stain and culture were negative   Consideration for viral meningitis/encephalitis   Also lower suspicion for HSV encephalitis as MRI does not show any temporal lobe enhancement   We can also see CSF pleocytosis with seizures   HSV PCR now negative   Based on negative MRI findings and CSF PCR, I have a very low suspicion for HSV encephalitis   However, may be false negative in the acute setting  Patient now status post repeat LP  Repeat MRI brain still with no focal findings was      -follow up repeat LP findings  -continue IV acyclovir for now pending repeat HSV PCR  If negative, would discontinue IV acyclovir   -follow-up CSF for West Nile virus      3  Acute encephalopathy   Consideration for viral encephalitis based on patient's presentation  Doubt bacterial meningitis as stated above  CT head was negative   Also consideration for seizure activity   MRI with no focal findings   Mental status continues to improve every day      4  Visual hallucinations    Unclear etiology  May be related to aseptic meningitis  Also potential side effect of IV acyclovir, although difficult to pinpoint when these symptoms started  Consideration for noninfectious etiology altogether as we never definitively proved her presentation was all related to CNS infection      -follow up repeat LP findings  -follow-up further recommendations by Neurology     5  Gram-positive bacteremia   So far, 1 of 2 blood cultures drawn at [facility] positive for lactococcus   This is likely a skin contaminant   The other blood culture remains negative      -continue to follow up blood culture data  -no additional antibiotic for this      6  Cushings disease   Patient was recently started on Korlym about 1 month ago  This has been held      Antibiotics:  Acyclovir IV D6     Discussed with patient and her daughter at bedside, and with Dr Carmela Edmond and Dr Jorgensen Standard  Subjective:  Mild headache  Patient does have her lumbar puncture and is laying flat  Denies fevers, chills, or sweats  Denies nausea, vomiting, or diarrhea        Objective:  Vitals:  Temp:  [97 8 °F (36 6 °C)-98 8 °F (37 1 °C)] 98 8 °F (37 1 °C)  HR:  [67-72] 69  Resp:  [16-20] 18  BP: (164-198)/() 170/85  SpO2:  [92 %-97 %] 92 %  Temp (24hrs), Av 2 °F (36 8 °C), Min:97 8 °F (36 6 °C), Max:98 8 °F (37 1 °C)  Current: Temperature: 98 8 °F (37 1 °C)    Physical Exam:   General:  Well-nourished, well-developed, in no acute distress  Eyes:  Conjunctive clear with no hemorrhages or effusions  Oropharynx:  No ulcers, no lesions  Neck:  Supple, no lymphadenopathy  Lungs:  Clear to auscultation bilaterally, no accessory muscle use  Cardiac:  Regular rate and rhythm, no murmurs  Abdomen:  Soft, non-tender, non-distented  Extremities:  No peripheral cyanosis, clubbing, or edema  Skin:  No rashes, no ulcers  Neurological:  Moves all four extremities spontaneously, sensation grossly intact    Lab Results:  I have personally reviewed pertinent labs     Results from last 7 days  Lab Units 10/25/18  0443 10/24/18  0442 10/23/18  0526 10/22/18  0508  10/20/18  0603  10/19/18  1131   SODIUM mmol/L 144 142 142 143  < > 139  < > 140   POTASSIUM mmol/L 2 7* 2 6* 3 1* 2 8*  < > 3 0*  < > 2 6*   CHLORIDE mmol/L 103 103 104 105  < > 103  < > 97*   CO2 mmol/L 32 32 31 31  < > 28  < > 32*   BUN mg/dL 4* 5 5 5  < > 9  < > 9   CREATININE mg/dL 0 85 0 87 0 92 0 87  < > 0 89  < > 0 88   EGFR ml/min/1 73sq m 69 67 62 67  < > 65  < > 66   CALCIUM mg/dL 9 1 9 4 9 8 9 3  < > 9 7  < > 11 0*   AST U/L  --   --   --  10  --  13  --  15   ALT U/L  --   --   --  12  --  11  --  11   ALK PHOS U/L  --   --   --  31*  --  27*  --  33*   < > = values in this interval not displayed  Results from last 7 days  Lab Units 10/25/18  0443 10/23/18  0526 10/22/18  0508   WBC Thousand/uL 8 28 10 15 8 36   HEMOGLOBIN g/dL 10 6* 11 3* 10 1*   PLATELETS Thousands/uL 249 211 159       Results from last 7 days  Lab Units 10/20/18  1457 10/19/18  1202 10/19/18  1131   BLOOD CULTURE   --   --  No Growth After 5 Days  Lactococcus garvieae*  Staphylococcus coagulase negative*  Pantoea agglomerans*   GRAM STAIN RESULT  4+ Polys  2+ Mononuclear Cells  No bacteria seen  --  Gram positive cocci in chains  Gram negative rods  Gram positive cocci in clusters   URINE CULTURE   --  10,000-19,000 cfu/ml   --        Imaging Studies:   I have personally reviewed pertinent imaging study reports and images in PACS  Chest x-ray shows bilateral atelectasis and small effusions    MRI brain shows no acute infarction, hemorrhage  No acute disease    EKG, Pathology, and Other Studies:   I have personally reviewed pertinent reports

## 2018-10-25 NOTE — PROGRESS NOTES
[facility] Progress Note  Patient: Radha Hart 68 y o  female   MRN: [id number]  PCP: Sade Stewart  Unit/Bed#: [facility] [address] 227-01 Encounter: [id number]  Date Of Visit: 10/25/18      Assessment/plan  1  Severe sepsis poa due to aseptic meningitis- appreciate ID recommendations  There was concern for bacterial infection as one of the blood cultures was positive for gram positive bacteremia that ended up being lactococcus which is a skin contaminant  Due to this she was treated originally with vanco/cefepime and acyclovir  Vanco/cefepime have been d/kush she was continued on acyclovir  Pt is s/p LP on 10/20  LP results are negative  Please see number 2       2  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  Appreciate neurology recommendations and ID  Possibly HSV but LP was negative  Question if sample was obtained too early  Repeat LP due to visual hallucinations that could be from acyclovir to see if this truly is HSV and whether we can stop acyclovir  She is s/p the repeat LP today  Last hallucinations were last night  Will await results    3  Visual hallucinations- questionable etiology  Possibly due to acyclovir vrs meningitis vrs seizures- repeat mri negative  eeg is pending  Awaiting results of repeat lp     4  Cushing disease- continue to hold mifepristone  Pt does not wish to take this medication any further       5  htn- continue diltiazem  Restart arb at low dose  Monitor creatinine while on acyclovir  Continue IV prn antihypertensives       6  Hypokalemia- continue replacement  Check bmp in am       7  Type 2 diabetes- a1c is 5 4  Continue insulin sliding scale  Pt is on metformin at home       8  hypomg-resolved       9  Dyspnea due to small bilateral pleural effusions and atelectasis- will decrease IV fluids  Will try to give lasix after acyclovir if creatinine is stable       dispo- appreciate physical therapy recommendations   Home health physical therapy vrs home pt    Subjective:   Pt seen and examined  Pt denies further visual hallucinations  No weakness  No headache  No f/c no cp no sob no n/v/d no abd pain  Objective:     Vitals: Blood pressure 154/84, pulse 67, temperature 98 7 °F (37 1 °C), temperature source Temporal, resp  rate 18, SpO2 94 %  ,There is no height or weight on file to calculate BMI  Lab, Imaging and other studies:    Results from last 7 days  Lab Units 10/25/18  0443  10/19/18  1131   WBC Thousand/uL 8 28  < > 12 90*   HEMOGLOBIN g/dL 10 6*  < > 12 9   HEMATOCRIT % 31 1*  < > 39 0   PLATELETS Thousands/uL 249  < > 227   INR   --   --  1 19   < > = values in this interval not displayed  Results from last 7 days  Lab Units 10/25/18  0443  10/22/18  0508   SODIUM mmol/L 144  < > 143   POTASSIUM mmol/L 2 7*  < > 2 8*   CHLORIDE mmol/L 103  < > 105   CO2 mmol/L 32  < > 31   BUN mg/dL 4*  < > 5   CREATININE mg/dL 0 85  < > 0 87   CALCIUM mg/dL 9 1  < > 9 3   ALK PHOS U/L  --   --  31*   ALT U/L  --   --  12   AST U/L  --   --  10   < > = values in this interval not displayed  Results from last 7 days  Lab Units 10/20/18  0603 10/19/18  1830 10/19/18  1509   TROPONIN I ng/mL 0 03 0 05* 0 04*     Lab Results   Component Value Date    BLOODCX No Growth After 5 Days   10/19/2018    BLOODCX Lactococcus garvieae (A) 10/19/2018    BLOODCX Staphylococcus coagulase negative (A) 10/19/2018    BLOODCX Pantoea agglomerans (A) 10/19/2018    URINECX 10,000-19,000 cfu/ml  10/19/2018    URINECX <1000 cfu/ml Mixed Contaminants X2 01/29/2017     Scheduled Meds:   Current Facility-Administered Medications:  acetaminophen 650 mg Oral Q6H PRN Delvin Meeks MD    acyclovir 10 mg/kg (Ideal) Intravenous Q8H Dayna Solis DO Last Rate: 525 mg (10/25/18 1406)   diltiazem 240 mg Oral Daily Delvin Meeks MD    enoxaparin 40 mg Subcutaneous Q24H [address] Delvin Meeks MD    insulin lispro 1-5 Units Subcutaneous TID [facility] Delvin Meeks MD    labetalol 10 mg Intravenous Q6H PRN Delvin Meeks, MD    [START ON 10/26/2018] losartan 50 mg Oral Daily Sammie Ambron, DO    melatonin 3 mg Oral HS Sammie Ambron, DO    ondansetron 4 mg Intravenous Q4H PRN Saleem Hurtado MD    potassium chloride 40 mEq Oral BID Sammie Ambron, DO    sodium chloride 100 mL/hr Intravenous Continuous Saleem Hurtado MD Last Rate: 100 mL/hr (10/25/18 1131)   thiamine 100 mg Oral Daily Saleem Hurtado MD      Continuous Infusions:   sodium chloride 100 mL/hr Last Rate: 100 mL/hr (10/25/18 1131)     PRN Meds:   acetaminophen    labetalol    ondansetron      Physical exam:  Physical Exam  General appearance: alert and oriented, in no acute distress  Head: Normocephalic, without obvious abnormality, atraumatic  Eyes: conjunctivae/corneas clear  PERRL, EOM's intact  Fundi benign    Neck: no adenopathy, no carotid bruit, no JVD, supple, symmetrical, trachea midline and thyroid not enlarged, symmetric, no tenderness/mass/nodules  Lungs: clear to auscultation bilaterally  Heart: regular rate and rhythm, S1, S2 normal, no murmur, click, rub or gallop  Abdomen: soft, non-tender; bowel sounds normal; no masses,  no organomegaly  Extremities: extremities normal, warm and well-perfused; no cyanosis, clubbing, or edema  Pulses: 2+ and symmetric  Skin: Skin color, texture, turgor normal  No rashes or lesions  Neurologic: Grossly normal      VTE Pharmacologic Prophylaxis: Enoxaparin (Lovenox)  VTE Mechanical Prophylaxis: sequential compression device    Counseling / Coordination of Care  Total floor / unit time spent today 20 minutes    Current Length of Stay: 5 day(s)    Current Patient Status: Inpatient       Code Status: Level 1 - Full Code

## 2018-10-25 NOTE — PROGRESS NOTES
71-year-old female patient with acute encephalopathy with a facies and sepsis  Chart reviewed, labs checked  Stable to proceed with image guided lumbar puncture

## 2018-10-25 NOTE — SEDATION DOCUMENTATION
13 ml clear CSF drained from lumbar puncture  Labs sent as ordered  VSS  Discharge back to [facility] [phone]  Bedrest 1 hour per Dr Dorinda Garcia

## 2018-10-25 NOTE — BRIEF OP NOTE (RAD/CATH)
IR IMAGE GUIDED LUMBAR PUNCTURE:  Procedure Note    PATIENT NAME: Mallory Garvey  : 1945  MRN: [id number]     Pre-op Diagnosis:   1  Toxic metabolic encephalopathy      Post-op Diagnosis:   1  Toxic metabolic encephalopathy        Surgeon:   Leesa Dixon MD  Assistants:     Estimated Blood Loss:  None  Findings:  L2/L3 amenable  Specimens:  13 mL clear, colorless spinal fluid drained into 4 tubes and submitted for laboratory analysis  Complications:  None      Anesthesia:  Indu Cotto MD     Date: 10/25/2018  Time: 11:02 AM

## 2018-10-26 LAB
ANION GAP SERPL CALCULATED.3IONS-SCNC: 10 MMOL/L (ref 4–13)
BUN SERPL-MCNC: 3 MG/DL (ref 5–25)
CALCIUM SERPL-MCNC: 9.5 MG/DL (ref 8.3–10.1)
CHLORIDE SERPL-SCNC: 102 MMOL/L (ref 100–108)
CO2 SERPL-SCNC: 30 MMOL/L (ref 21–32)
CREAT SERPL-MCNC: 0.79 MG/DL (ref 0.6–1.3)
GFR SERPL CREATININE-BSD FRML MDRD: 75 ML/MIN/1.73SQ M
GLUCOSE SERPL-MCNC: 117 MG/DL (ref 65–140)
GLUCOSE SERPL-MCNC: 123 MG/DL (ref 65–140)
GLUCOSE SERPL-MCNC: 124 MG/DL (ref 65–140)
GLUCOSE SERPL-MCNC: 142 MG/DL (ref 65–140)
GLUCOSE SERPL-MCNC: 143 MG/DL (ref 65–140)
POTASSIUM SERPL-SCNC: 3 MMOL/L (ref 3.5–5.3)
SODIUM SERPL-SCNC: 142 MMOL/L (ref 136–145)

## 2018-10-26 PROCEDURE — 82948 REAGENT STRIP/BLOOD GLUCOSE: CPT

## 2018-10-26 PROCEDURE — 97530 THERAPEUTIC ACTIVITIES: CPT

## 2018-10-26 PROCEDURE — 99232 SBSQ HOSP IP/OBS MODERATE 35: CPT | Performed by: INTERNAL MEDICINE

## 2018-10-26 PROCEDURE — 97116 GAIT TRAINING THERAPY: CPT

## 2018-10-26 PROCEDURE — 80048 BASIC METABOLIC PNL TOTAL CA: CPT | Performed by: INTERNAL MEDICINE

## 2018-10-26 PROCEDURE — 99233 SBSQ HOSP IP/OBS HIGH 50: CPT | Performed by: INTERNAL MEDICINE

## 2018-10-26 RX ORDER — LOSARTAN POTASSIUM 50 MG/1
100 TABLET ORAL DAILY
Status: DISCONTINUED | OUTPATIENT
Start: 2018-10-27 | End: 2018-10-28 | Stop reason: HOSPADM

## 2018-10-26 RX ORDER — AMLODIPINE BESYLATE 5 MG/1
5 TABLET ORAL DAILY
Status: DISCONTINUED | OUTPATIENT
Start: 2018-10-27 | End: 2018-10-26

## 2018-10-26 RX ORDER — AMLODIPINE BESYLATE 5 MG/1
5 TABLET ORAL DAILY
Status: DISCONTINUED | OUTPATIENT
Start: 2018-10-26 | End: 2018-10-28 | Stop reason: HOSPADM

## 2018-10-26 RX ORDER — LABETALOL HYDROCHLORIDE 5 MG/ML
10 INJECTION, SOLUTION INTRAVENOUS EVERY 4 HOURS PRN
Status: DISCONTINUED | OUTPATIENT
Start: 2018-10-26 | End: 2018-10-28 | Stop reason: HOSPADM

## 2018-10-26 RX ORDER — LOSARTAN POTASSIUM 50 MG/1
50 TABLET ORAL ONCE
Status: COMPLETED | OUTPATIENT
Start: 2018-10-26 | End: 2018-10-26

## 2018-10-26 RX ORDER — HYDRALAZINE HYDROCHLORIDE 20 MG/ML
5 INJECTION INTRAMUSCULAR; INTRAVENOUS EVERY 6 HOURS PRN
Status: DISCONTINUED | OUTPATIENT
Start: 2018-10-26 | End: 2018-10-26

## 2018-10-26 RX ORDER — HYDRALAZINE HYDROCHLORIDE 20 MG/ML
10 INJECTION INTRAMUSCULAR; INTRAVENOUS EVERY 6 HOURS PRN
Status: DISCONTINUED | OUTPATIENT
Start: 2018-10-26 | End: 2018-10-28 | Stop reason: HOSPADM

## 2018-10-26 RX ADMIN — AMLODIPINE BESYLATE 5 MG: 5 TABLET ORAL at 17:39

## 2018-10-26 RX ADMIN — ENOXAPARIN SODIUM 40 MG: 40 INJECTION SUBCUTANEOUS at 08:12

## 2018-10-26 RX ADMIN — HYDRALAZINE HYDROCHLORIDE 5 MG: 20 INJECTION INTRAMUSCULAR; INTRAVENOUS at 11:30

## 2018-10-26 RX ADMIN — LOSARTAN POTASSIUM 50 MG: 50 TABLET ORAL at 11:30

## 2018-10-26 RX ADMIN — HYDRALAZINE HYDROCHLORIDE 10 MG: 20 INJECTION INTRAMUSCULAR; INTRAVENOUS at 20:33

## 2018-10-26 RX ADMIN — DILTIAZEM HYDROCHLORIDE 240 MG: 240 CAPSULE, COATED, EXTENDED RELEASE ORAL at 08:12

## 2018-10-26 RX ADMIN — SODIUM CHLORIDE 75 ML/HR: 0.9 INJECTION, SOLUTION INTRAVENOUS at 17:13

## 2018-10-26 RX ADMIN — LOSARTAN POTASSIUM 50 MG: 50 TABLET ORAL at 08:12

## 2018-10-26 RX ADMIN — POTASSIUM CHLORIDE 40 MEQ: 1500 TABLET, EXTENDED RELEASE ORAL at 08:12

## 2018-10-26 RX ADMIN — ACYCLOVIR SODIUM 525 MG: 50 INJECTION, SOLUTION INTRAVENOUS at 21:57

## 2018-10-26 RX ADMIN — ACYCLOVIR SODIUM 525 MG: 50 INJECTION, SOLUTION INTRAVENOUS at 14:03

## 2018-10-26 RX ADMIN — POTASSIUM CHLORIDE 40 MEQ: 1500 TABLET, EXTENDED RELEASE ORAL at 17:13

## 2018-10-26 RX ADMIN — MELATONIN TAB 3 MG 3 MG: 3 TAB at 21:57

## 2018-10-26 RX ADMIN — Medication 100 MG: at 08:12

## 2018-10-26 RX ADMIN — ACYCLOVIR SODIUM 525 MG: 50 INJECTION, SOLUTION INTRAVENOUS at 04:47

## 2018-10-26 NOTE — SOCIAL WORK
CM met with pt at bedside to discuss PT's recommendation of home PT and determination if she would be agreeable  Dtr, iSlas Tse, was present at bedside  CM made pt aware that home PT was being recommended, but pt declined  She reported that she "takes walks her development," and doesn't need therapy

## 2018-10-26 NOTE — PHYSICAL THERAPY NOTE
Physical Therapy Progress Note     10/26/18 1533   Pain Assessment   Pain Assessment No/denies pain   Pain Score No Pain   Hospital Pain Intervention(s) Ambulation/increased activity;Repositioned   Restrictions/Precautions   Weight Bearing Precautions Per Order No   Other Precautions Multiple lines; Fall Risk   General   Chart Reviewed Yes   Response to Previous Treatment Patient with no complaints from previous session  Family/Caregiver Present Yes  (Pt's daughter present during treatment session )   Subjective   Subjective Willing to participate in therapy this PM    Bed Mobility   Supine to Sit 5  Supervision   Additional items Assist x 1;HOB elevated; Bedrails; Increased time required;Verbal cues;LE management   Sit to Supine 5  Supervision   Additional items Assist x 1;Bedrails; Increased time required;Verbal cues;LE management   Transfers   Sit to Stand 5  Supervision   Additional items Assist x 1;Bedrails; Increased time required;Verbal cues   Stand to Sit 5  Supervision   Additional items Assist x 1;Bedrails; Increased time required;Verbal cues   Toilet transfer 6  Modified independent   Additional items Armrests; Increased time required;Standard toilet   Ambulation/Elevation   Gait pattern Decreased foot clearance; Inconsistent yifan   Gait Assistance 5  Supervision   Additional items Assist x 1;Verbal cues; Tactile cues   Assistive Device None   Distance 150'   Balance   Static Sitting Normal   Dynamic Sitting Fair   Static Standing Fair   Dynamic Standing Fair   Ambulatory Fair   Endurance Deficit   Endurance Deficit No   Activity Tolerance   Activity Tolerance Patient tolerated treatment well   Nurse Made Aware Yes   Assessment   Prognosis Good   Problem List Decreased strength;Decreased endurance; Impaired balance;Decreased mobility   Assessment Pt  supine in bed upon my arrival  Pt  reports feeling fatigue, however agreeable to therapeutic intervention   Able to complete transfers practicing proper technique with no noted LOB  Pt  requested to use br, able to complete self pericare  Continued with an amb  trial with no AD, use of IV pole for amb  trial  Attempted to discuss use of SPC, however pt  continues to refuse at this time  Discussion of stair training, however pt  deferred at this time due to fatigue  Returned to room and repositioned supine in bed at end of treatment session  Would continue to recommend HHPT and family support as needed when medically stable for d/c  Would benefit from stair training prior to d/c to ensure safe d/c plan  Barriers to Discharge Inaccessible home environment   Barriers to Discharge Comments LESA   Goals   Patient Goals To get better  STG Expiration Date 10/30/18   Treatment Day 1   Plan   Treatment/Interventions LE strengthening/ROM; Functional transfer training; Endurance training;Bed mobility;Gait training;Spoke to nursing;Spoke to case management; Family   Progress Progressing toward goals   PT Frequency Other (Comment)  (4-5x/wk)   Recommendation   Recommendation Home PT; Home with family support  (pt  refusing HHPT at this time )   Equipment Recommended Other (Comment)  (continues to decline use of SPC at this time )   PT - OK to Discharge Yes  (would benefit from stair training prior to d/c )     Shane Hernandez, PTA

## 2018-10-26 NOTE — PLAN OF CARE
INFECTION - ADULT     Absence or prevention of progression during hospitalization Progressing        Potential for Falls     Patient will remain free of falls Progressing        Prexisting or High Potential for Compromised Skin Integrity     Skin integrity is maintained or improved Progressing        SAFETY ADULT     Maintain or return to baseline ADL function Progressing        SKIN/TISSUE INTEGRITY - ADULT     Skin integrity remains intact Progressing

## 2018-10-26 NOTE — PLAN OF CARE
Problem: PHYSICAL THERAPY ADULT  Goal: Performs mobility at highest level of function for planned discharge setting  See evaluation for individualized goals  Treatment/Interventions: Functional transfer training, LE strengthening/ROM, Elevations, Therapeutic exercise, Endurance training, Patient/family training, Bed mobility, Gait training, Spoke to nursing, Family  Equipment Recommended: Other (Comment) (offered SPC but pt declined)       See flowsheet documentation for full assessment, interventions and recommendations  Outcome: Progressing  Prognosis: Good  Problem List: Decreased strength, Decreased endurance, Impaired balance, Decreased mobility  Assessment: Pt  supine in bed upon my arrival  Pt  reports feeling fatigue, however agreeable to therapeutic intervention  Able to complete transfers practicing proper technique with no noted LOB  Pt  requested to use br, able to complete self pericare  Continued with an amb  trial with no AD, use of IV pole for amb  trial  Attempted to discuss use of SPC, however pt  continues to refuse at this time  Discussion of stair training, however pt  deferred at this time due to fatigue  Returned to room and repositioned supine in bed at end of treatment session  Would continue to recommend HHPT and family support as needed when medically stable for d/c  Would benefit from stair training prior to d/c to ensure safe d/c plan  Barriers to Discharge: Inaccessible home environment  Barriers to Discharge Comments: LESA  Recommendation: Home PT, Home with family support (pt  refusing HHPT at this time )     PT - OK to Discharge: Yes (would benefit from stair training prior to d/c )    See flowsheet documentation for full assessment

## 2018-10-26 NOTE — PROGRESS NOTES
Progress Note - Infectious Disease   Rukhsana Reardon 68 y o  female MRN: [id number]  Unit/Bed#: [facility] [address] 227[address]01 Encounter: [id number]      mpression/Recommendations:    1  Severe sepsis, present on admission to [address] [place] [address] [address] [facility] on 10/19   With reported fever of 104, leukocytosis, lactic acidosis  May be secondary to primary CNS process, as stated below   Bacteremia is also a possibility, although less likely  Doubt UTI as the cause given severity of encephalopathy  Patient was started empirically on vancomycin and cefepime   Leukocytosis and lactic acidosis has improved   No fevers here  Fortunately, remains hemodynamically stable, nontoxic      - anti microbial plan as below  - monitor temperatures and hemodynamics  -  Supportive care     2  Aseptic meningitis/encephalitis  Initial lumbar puncture revealed 62 WBCs   Glucose was 71, protein 72  Doubt bacterial meningitis based on patient's clinical presentation, LP findings   CSF gram stain and culture were negative   Consideration for viral meningitis/encephalitis   Also lower suspicion for HSV encephalitis as MRI does not show any temporal lobe enhancement   We can also see CSF pleocytosis with seizures   Initial HSV PCR was negative   Based on negative MRI findings and CSF PCR, I have a very low suspicion for HSV encephalitis   However, may be false negative in the acute setting  Patient now status post repeat LP, now revealing 0 WBCs, normal glucose  Repeat MRI brain still with no focal findings  Repeat HSV PCR is pending      -continue IV acyclovir for now pending repeat HSV PCR  If negative, would discontinue IV acyclovir    -recommend continued IV hydration and close monitoring of renal function while receiving IV acyclovir  -follow-up CSF for West Nile virus      3  Acute encephalopathy   Consideration for viral encephalitis based on patient's presentation   Doubt bacterial meningitis as stated above   CT head was negative   Also consideration for seizure activity   MRI with no focal findings   Mental status continues to improve every day  Seems to be back to baseline per patient's daughter      4  Visual hallucinations   Unclear etiology   May be related to aseptic meningitis   Also potential side effect of IV acyclovir, although difficult to pinpoint when these symptoms started   Consideration for noninfectious etiology altogether as we never definitively proved her presentation was all related to CNS infection  This seems to be improving      -plan as stated above     5  Gram-positive bacteremia   So far, 1 of 2 blood cultures drawn at [facility] positive for lactococcus   This is likely a skin contaminant   The other blood culture remains negative      -continue to follow up blood culture data  -no additional antibiotic for this      6  Cushings disease   Patient was recently started on Korlym about 1 month ago  Wale Stover has been held      Antibiotics:  Acyclovir IV D7     Discussed with patient and her daughter at bedside, and with Dr Han Guzman   Patient is stable from ID standpoint  Will plan to see again on 10/29  Call with any questions in the interim  Subjective:  Patient feeling better today  Denies any headaches, double vision  She had an episode of visual hallucinations overnight, but none today  No fevers, chills, neck pain, neck stiffness  She is able to ambulate without difficulty      Objective:  Vitals:  Temp:  [98 8 °F (37 1 °C)] 98 8 °F (37 1 °C)  HR:  [65-76] 65  Resp:  [16-18] 16  BP: (173-201)/() 177/85  SpO2:  [90 %-95 %] 95 %  Temp (24hrs), Av 8 °F (37 1 °C), Min:98 8 °F (37 1 °C), Max:98 8 °F (37 1 °C)  Current: Temperature: 98 8 °F (37 1 °C)    Physical Exam:   General:  Well-nourished, well-developed, in no acute distress  Eyes:  Conjunctive clear with no hemorrhages or effusions  Oropharynx:  No ulcers, no lesions  Neck:  Supple, no lymphadenopathy  Lungs:  Clear to auscultation bilaterally, no accessory muscle use  Cardiac: Regular rate and rhythm, no murmurs  Abdomen:  Soft, non-tender, non-distented  Extremities:  No peripheral cyanosis, clubbing, or edema  Skin:  No rashes, no ulcers  Neurological:  Moves all four extremities spontaneously, sensation grossly intact    Lab Results:  I have personally reviewed pertinent labs  Results from last 7 days  Lab Units 10/26/18  0443 10/25/18  0443 10/24/18  0442  10/22/18  0508  10/20/18  0603   SODIUM mmol/L 142 144 142  < > 143  < > 139   POTASSIUM mmol/L 3 0* 2 7* 2 6*  < > 2 8*  < > 3 0*   CHLORIDE mmol/L 102 103 103  < > 105  < > 103   CO2 mmol/L 30 32 32  < > 31  < > 28   BUN mg/dL 3* 4* 5  < > 5  < > 9   CREATININE mg/dL 0 79 0 85 0 87  < > 0 87  < > 0 89   EGFR ml/min/1 73sq m 75 69 67  < > 67  < > 65   CALCIUM mg/dL 9 5 9 1 9 4  < > 9 3  < > 9 7   AST U/L  --   --   --   --  10  --  13   ALT U/L  --   --   --   --  12  --  11   ALK PHOS U/L  --   --   --   --  31*  --  27*   < > = values in this interval not displayed  Results from last 7 days  Lab Units 10/25/18  0443 10/23/18  0526 10/22/18  0508   WBC Thousand/uL 8 28 10 15 8 36   HEMOGLOBIN g/dL 10 6* 11 3* 10 1*   PLATELETS Thousands/uL 249 211 159       Results from last 7 days  Lab Units 10/20/18  1457   GRAM STAIN RESULT  4+ Polys  2+ Mononuclear Cells  No bacteria seen       Imaging Studies:   I have personally reviewed pertinent imaging study reports and images in PACS  EKG, Pathology, and Other Studies:   I have personally reviewed pertinent reports

## 2018-10-26 NOTE — PROGRESS NOTES
[facility] Progress Note  Patient: Nayla Kent 68 y o  female   MRN: [id number]  PCP: Calin Romero  Unit/Bed#: [facility] [address] [place] [address] Encounter: [id number]  Date Of Visit: 10/26/18      Assessment/plan  1  Severe sepsis poa due to aseptic meningitis- appreciate ID recommendations  There was concern for bacterial infection as one of the blood cultures was positive for gram positive bacteremia that ended up being lactococcus which is a skin contaminant  Due to this she was treated originally with vanco/cefepime and acyclovir  Vanco/cefepime have been d/kush she was continued on acyclovir  Pt is s/p LP on 10/20  LP results are negative  Please see number 2       2  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  Appreciate neurology recommendations and ID  Possibly HSV but LP was negative  Question if sample was obtained too early  Repeat LP due to visual hallucinations that could be from acyclovir to see if this truly is HSV and whether we can stop acyclovir  She is s/p the repeat LP yesterday  She did have some hallucinations last night       3  Visual hallucinations- questionable etiology  Possibly due to acyclovir vrs meningitis vrs seizures- repeat mri negative  eeg is pending  Awaiting results of repeat lp     4  Cushing disease- continue to hold mifepristone  Pt does not wish to take this medication any further       5  htn- continue diltiazem  Increase losartan to home dose  Monitor creatinine while on acyclovir  Continue IV prn antihypertensives       6  Hypokalemia- continue replacement  Check bmp in am       7  Type 2 diabetes- a1c is 5 4  Continue insulin sliding scale  Pt is on metformin at home       8  hypomg-resolved       9  Dyspnea due to small bilateral pleural effusions and atelectasis- continue IV fluids at a low rate  Will try to give lasix after acyclovir if creatinine is stable       dispo- appreciate physical therapy recommendations   Home health physical therapy vrs home pt    Subjective:   Pt seen and examined  Pt states she slept better last night with the melatonin  She states she did have some visual hallucinations last night  She woke up with a headache but no further headache now  No f/c no cp no headache  No blurred or double vision  No sob no n/v/d no abd pain    Objective:     Vitals: Blood pressure (!) 177/85, pulse 65, temperature 98 8 °F (37 1 °C), temperature source Tympanic, resp  rate 16, SpO2 95 %  ,There is no height or weight on file to calculate BMI  Lab, Imaging and other studies:    Results from last 7 days  Lab Units 10/25/18  0443   WBC Thousand/uL 8 28   HEMOGLOBIN g/dL 10 6*   HEMATOCRIT % 31 1*   PLATELETS Thousands/uL 249       Results from last 7 days  Lab Units 10/26/18  0443  10/22/18  0508   SODIUM mmol/L 142  < > 143   POTASSIUM mmol/L 3 0*  < > 2 8*   CHLORIDE mmol/L 102  < > 105   CO2 mmol/L 30  < > 31   BUN mg/dL 3*  < > 5   CREATININE mg/dL 0 79  < > 0 87   CALCIUM mg/dL 9 5  < > 9 3   ALK PHOS U/L  --   --  31*   ALT U/L  --   --  12   AST U/L  --   --  10   < > = values in this interval not displayed  Results from last 7 days  Lab Units 10/20/18  0603 10/19/18  1830 10/19/18  1509   TROPONIN I ng/mL 0 03 0 05* 0 04*     Lab Results   Component Value Date    BLOODCX No Growth After 5 Days   10/19/2018    BLOODCX Lactococcus garvieae (A) 10/19/2018    BLOODCX Staphylococcus coagulase negative (A) 10/19/2018    BLOODCX Pantoea agglomerans (A) 10/19/2018    URINECX 10,000-19,000 cfu/ml  10/19/2018    URINECX <1000 cfu/ml Mixed Contaminants X2 01/29/2017     Scheduled Meds:   Current Facility-Administered Medications:  acetaminophen 650 mg Oral Q6H PRN Migue Velez MD    acyclovir 10 mg/kg (Ideal) Intravenous Q8H Dayna Will, DO Last Rate: Stopped (10/26/18 0600)   diltiazem 240 mg Oral Daily Migue Velez MD    enoxaparin 40 mg Subcutaneous Q24H [address] Migue Velez MD    hydrALAZINE 5 mg Intravenous Q6H PRN Sammie Yung DO    insulin lispro 1-5 Units Subcutaneous TID [facility] Yobani Cevallos MD    labetalol 10 mg Intravenous Q6H PRN Yobani Cevallos MD    [START ON 10/27/2018] losartan 100 mg Oral Daily Sammie Ambron, DO    melatonin 3 mg Oral HS Sammie Ambron, DO    ondansetron 4 mg Intravenous Q4H PRN Yobani Cevallos MD    potassium chloride 40 mEq Oral BID Sammie Ambron, DO    sodium chloride 75 mL/hr Intravenous Continuous Sammie Ambron, DO Last Rate: 75 mL/hr (10/25/18 2124)   thiamine 100 mg Oral Daily Yobani Cevallos MD      Continuous Infusions:   sodium chloride 75 mL/hr Last Rate: 75 mL/hr (10/25/18 2124)     PRN Meds:   acetaminophen    hydrALAZINE    labetalol    ondansetron      Physical exam:  Physical Exam  General appearance: alert and oriented, in no acute distress  Head: Normocephalic, without obvious abnormality, atraumatic  Eyes: conjunctivae/corneas clear  PERRL, EOM's intact  Fundi benign    Neck: no adenopathy, no carotid bruit, no JVD, supple, symmetrical, trachea midline and thyroid not enlarged, symmetric, no tenderness/mass/nodules  Lungs: clear to auscultation bilaterally  Heart: regular rate and rhythm, S1, S2 normal, no murmur, click, rub or gallop  Abdomen: soft, non-tender; bowel sounds normal; no masses,  no organomegaly  Extremities: extremities normal, warm and well-perfused; no cyanosis, clubbing, or edema  Pulses: 2+ and symmetric  Skin: Skin color, texture, turgor normal  No rashes or lesions  Neurologic: Grossly normal      VTE Pharmacologic Prophylaxis: Enoxaparin (Lovenox)  VTE Mechanical Prophylaxis: sequential compression device    Counseling / Coordination of Care  Total floor / unit time spent today 20 minutes    Current Length of Stay: 6 day(s)    Current Patient Status: Inpatient       Code Status: Level 1 - Full Code

## 2018-10-27 LAB
ANION GAP SERPL CALCULATED.3IONS-SCNC: 9 MMOL/L (ref 4–13)
BUN SERPL-MCNC: 5 MG/DL (ref 5–25)
CALCIUM SERPL-MCNC: 9.4 MG/DL (ref 8.3–10.1)
CHLORIDE SERPL-SCNC: 101 MMOL/L (ref 100–108)
CO2 SERPL-SCNC: 30 MMOL/L (ref 21–32)
CREAT SERPL-MCNC: 0.77 MG/DL (ref 0.6–1.3)
GFR SERPL CREATININE-BSD FRML MDRD: 77 ML/MIN/1.73SQ M
GLUCOSE SERPL-MCNC: 108 MG/DL (ref 65–140)
GLUCOSE SERPL-MCNC: 121 MG/DL (ref 65–140)
GLUCOSE SERPL-MCNC: 134 MG/DL (ref 65–140)
GLUCOSE SERPL-MCNC: 151 MG/DL (ref 65–140)
GLUCOSE SERPL-MCNC: 176 MG/DL (ref 65–140)
POTASSIUM SERPL-SCNC: 2.9 MMOL/L (ref 3.5–5.3)
SODIUM SERPL-SCNC: 140 MMOL/L (ref 136–145)

## 2018-10-27 PROCEDURE — 82024 ASSAY OF ACTH: CPT | Performed by: INTERNAL MEDICINE

## 2018-10-27 PROCEDURE — 99232 SBSQ HOSP IP/OBS MODERATE 35: CPT | Performed by: INTERNAL MEDICINE

## 2018-10-27 PROCEDURE — 82948 REAGENT STRIP/BLOOD GLUCOSE: CPT

## 2018-10-27 PROCEDURE — 80048 BASIC METABOLIC PNL TOTAL CA: CPT | Performed by: INTERNAL MEDICINE

## 2018-10-27 RX ORDER — POTASSIUM CHLORIDE 20 MEQ/1
40 TABLET, EXTENDED RELEASE ORAL
Status: DISCONTINUED | OUTPATIENT
Start: 2018-10-27 | End: 2018-10-28 | Stop reason: HOSPADM

## 2018-10-27 RX ADMIN — SODIUM CHLORIDE 75 ML/HR: 0.9 INJECTION, SOLUTION INTRAVENOUS at 08:53

## 2018-10-27 RX ADMIN — ENOXAPARIN SODIUM 40 MG: 40 INJECTION SUBCUTANEOUS at 08:54

## 2018-10-27 RX ADMIN — ACYCLOVIR SODIUM 525 MG: 50 INJECTION, SOLUTION INTRAVENOUS at 12:53

## 2018-10-27 RX ADMIN — INSULIN LISPRO 1 UNITS: 100 INJECTION, SOLUTION INTRAVENOUS; SUBCUTANEOUS at 17:03

## 2018-10-27 RX ADMIN — MELATONIN TAB 3 MG 3 MG: 3 TAB at 21:12

## 2018-10-27 RX ADMIN — POTASSIUM CHLORIDE 40 MEQ: 1500 TABLET, EXTENDED RELEASE ORAL at 12:54

## 2018-10-27 RX ADMIN — POTASSIUM CHLORIDE 40 MEQ: 1500 TABLET, EXTENDED RELEASE ORAL at 08:56

## 2018-10-27 RX ADMIN — Medication 100 MG: at 08:57

## 2018-10-27 RX ADMIN — POTASSIUM CHLORIDE 40 MEQ: 1500 TABLET, EXTENDED RELEASE ORAL at 18:19

## 2018-10-27 RX ADMIN — AMLODIPINE BESYLATE 5 MG: 5 TABLET ORAL at 08:56

## 2018-10-27 RX ADMIN — LOSARTAN POTASSIUM 100 MG: 50 TABLET ORAL at 08:57

## 2018-10-27 RX ADMIN — ACYCLOVIR SODIUM 525 MG: 50 INJECTION, SOLUTION INTRAVENOUS at 05:06

## 2018-10-27 RX ADMIN — DILTIAZEM HYDROCHLORIDE 240 MG: 240 CAPSULE, COATED, EXTENDED RELEASE ORAL at 08:57

## 2018-10-27 RX ADMIN — ACYCLOVIR SODIUM 525 MG: 50 INJECTION, SOLUTION INTRAVENOUS at 21:12

## 2018-10-27 RX ADMIN — INSULIN LISPRO 1 UNITS: 100 INJECTION, SOLUTION INTRAVENOUS; SUBCUTANEOUS at 12:53

## 2018-10-27 NOTE — UTILIZATION REVIEW
Primary Auth: [id number]    Mayito Arnold, RN Registered Nurse Signed Utilization Review Date of Service: 10/23/2018  4:02 PM      Continued Stay Review     Date:  10/23/2018     Vital Signs: /81 (BP Location: Left arm)   Pulse 71   Temp 98 2 °F (36 8 °C) (Temporal)   Resp 18   SpO2 97%       Medications:   Scheduled Meds:   Current Facility-Administered Medications:                acyclovir 10 mg/kg (Ideal) Intravenous Q8H       diltiazem 240 mg Oral Daily       enoxaparin 40 mg Subcutaneous Q24H MARQUEZ       insulin lispro 1-5 Units Subcutaneous TID AC                                                 thiamine 100 mg Oral Daily          Continuous Infusions:   sodium chloride 100 mL/hr Last Rate: 100 mL/hr (10/23/18 0000)      PRN Meds:   Acetaminophen x 1    labetalol    ondansetron     Abnormal Labs/Diagnostic Results:   H&H 11 3 / 33 3  K 3 1     Age/Sex: 68 y o  female      C/O mild Headache today     Assessment/Plan:  69 yo female admitted with Severe sepsis poa due to aseptic meningitis- appreciate ID recommendations  There was concern for bacterial infection as one of the blood cultures was positive for gram positive bacteremia that ended up being lactococcus which is a skin contaminant  Due to this she was treated originally with vanco/cefepime and acyclovir  Vanco/cefepime have been d/kush she was continued on acyclovir  Pt is s/p LP on 10/20  LP results are negative  Please see number 2    2  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  Possibly HSV but LP was negative  Question if sample was obtained too early  Question if should repeat LP  Was present as ID spoke with patient about this  Will continue acyclovir for now and rediscuss with pt tomorrow about repeat of LP  Continue to hold arb while on acyclovir  Continue prn IV antihypertensives  Replete K  Continue insulin sliding scale     KCL 20 IV X 2 doses today for K 3 1  On O2  @ 2 liters NC     Discharge Plan:  To be determined  PT Eval pending        Thank you,  [address]  Utilization Review Department  Phone: [phone]; Fax [phone]  ATTENTION: Please call with any questions or concerns to [phone]  and carefully follow the prompts so that you are directed to the right person  Send all requests for admission clinical reviews, approved or denied determinations and any other requests to fax [phone]   All voicemails are confidential

## 2018-10-27 NOTE — PROGRESS NOTES
[facility] Progress Note  Patient: Mallory Garvey 68 y o  female   MRN: [id number]  PCP: Felicita Malay  Unit/Bed#: [facility] [id number] Encounter: [id number]  Date Of Visit: 10/27/18      Assessment/plan  1  Severe sepsis poa due to aseptic meningitis- appreciate ID recommendations  There was concern for bacterial infection as one of the blood cultures was positive for gram positive bacteremia that ended up being lactococcus which is a skin contaminant  Due to this she was treated originally with vanco/cefepime and acyclovir  Vanco/cefepime have been d/kush she was continued on acyclovir  Pt is s/p LP on 10/20  LP results are negative  Please see number 2       2  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  Appreciate neurology recommendations and ID  Possibly HSV but LP was negative  Question if sample was obtained too early  Repeat LP due to visual hallucinations that could be from acyclovir to see if this truly is HSV and whether we can stop acyclovir  She is s/p the repeat LP yesterday  HSV is pending     3  Visual hallucinations- questionable etiology  Possibly due to acyclovir vrs meningitis vrs seizures vs cushing- repeat mri negative  eeg is pending  Awaiting results of repeat lp  Will consult endocrinology      4  Cushing disease- pt is htn and hypokalemic  Pt was on korlym outpt  She has never tried any other medications prior to this  She did have a lot of side effects from this medication  Will consult endocrinology for eval of restarting korlym or trying other medications  Question if hsv is negative is untreated cushing the cause of visual hallucinations  Perhaps she had a viral illness that worsened the cushing symptoms  Cortisol level on 10/21 could have been falsely elevated due to korlym and its prolonged half life  Pt up to this point has not wanted to restart korlym       5  htn accelerated due to cushings- medication adjusted  Will continue to adjust as needed     6  Hypokalemia- due to cushings  Continue replacement       7  Type 2 diabetes- a1c is 5 4  Continue insulin sliding scale  Pt is on metformin at home       8  hypomg-resolved       9  Dyspnea due to small bilateral pleural effusions and atelectasis- continue IV fluids at a low rate  Will try to give lasix after acyclovir if creatinine is stable       Subjective:   Pt seen and examined  Pt had htn episode yesterday and hypokalemic  Spoke with family that this is a result of the cushings  Pt has been reluctant to restart korlym  She is reluctant due to the side effects  She is agreeable to speaking with endocrinology  Pt states she still has a headache at time and had visual hallucinations yesterday but none today  No blurred/double vision  No f/c no cp no sob no n/v/d no abd pain    Objective:     Vitals: Blood pressure 165/88, pulse 73, temperature 97 5 °F (36 4 °C), temperature source Temporal, resp  rate 18, SpO2 94 %  ,There is no height or weight on file to calculate BMI  Lab, Imaging and other studies:    Results from last 7 days  Lab Units 10/25/18  0443   WBC Thousand/uL 8 28   HEMOGLOBIN g/dL 10 6*   HEMATOCRIT % 31 1*   PLATELETS Thousands/uL 249       Results from last 7 days  Lab Units 10/27/18  0459  10/22/18  0508   SODIUM mmol/L 140  < > 143   POTASSIUM mmol/L 2 9*  < > 2 8*   CHLORIDE mmol/L 101  < > 105   CO2 mmol/L 30  < > 31   BUN mg/dL 5  < > 5   CREATININE mg/dL 0 77  < > 0 87   CALCIUM mg/dL 9 4  < > 9 3   ALK PHOS U/L  --   --  31*   ALT U/L  --   --  12   AST U/L  --   --  10   < > = values in this interval not displayed  Lab Results   Component Value Date    BLOODCX No Growth After 5 Days   10/19/2018    BLOODCX Lactococcus garvieae (A) 10/19/2018    BLOODCX Staphylococcus coagulase negative (A) 10/19/2018    BLOODCX Pantoea agglomerans (A) 10/19/2018    URINECX 10,000-19,000 cfu/ml  10/19/2018    URINECX <1000 cfu/ml Mixed Contaminants X2 01/29/2017     Scheduled Meds:   Current Facility-Administered Medications:  acetaminophen 650 mg Oral Q6H PRN Ria Thompson MD    acyclovir 10 mg/kg (Ideal) Intravenous Q8H Dayna Aldea, DO Last Rate: 525 mg (10/27/18 0506)   amLODIPine 5 mg Oral Daily Sammie Ambron, DO    diltiazem 240 mg Oral Daily Ria Thompson MD    enoxaparin 40 mg Subcutaneous Q24H [address] Ria Thompson MD    hydrALAZINE 10 mg Intravenous Q6H PRN Alice Fuel, DO    insulin lispro 1-5 Units Subcutaneous TID AC Ria Thompson MD    labetalol 10 mg Intravenous Q4H PRN Sammie Ambron, DO    losartan 100 mg Oral Daily Sammie Ambron, DO    melatonin 3 mg Oral HS Sammie Ambron, DO    ondansetron 4 mg Intravenous Q4H PRN Ria Thompson MD    potassium chloride 40 mEq Oral TID With Meals Sammie Ambron, DO    sodium chloride 75 mL/hr Intravenous Continuous Sammie Ambron, DO Last Rate: 75 mL/hr (10/27/18 0853)   thiamine 100 mg Oral Daily Ria Thompson MD      Continuous Infusions:   sodium chloride 75 mL/hr Last Rate: 75 mL/hr (10/27/18 0853)     PRN Meds:   acetaminophen    hydrALAZINE    labetalol    ondansetron      Physical exam:  Physical Exam  General appearance: alert and oriented, in no acute distress  Head: Normocephalic, without obvious abnormality, atraumatic  Eyes: conjunctivae/corneas clear  PERRL, EOM's intact  Fundi benign    Neck: no adenopathy, no carotid bruit, no JVD, supple, symmetrical, trachea midline and thyroid not enlarged, symmetric, no tenderness/mass/nodules  Lungs: clear to auscultation bilaterally  Heart: regular rate and rhythm, S1, S2 normal, no murmur, click, rub or gallop  Abdomen: soft, non-tender; bowel sounds normal; no masses,  no organomegaly  Extremities: extremities normal, warm and well-perfused; no cyanosis, clubbing, or edema  Pulses: 2+ and symmetric  Skin: Skin color, texture, turgor normal  No rashes or lesions  Neurologic: Grossly normal      VTE Pharmacologic Prophylaxis: Enoxaparin (Lovenox)  VTE Mechanical Prophylaxis: sequential compression device    Counseling / Coordination of Care  Total floor / unit time spent today 20 minutes     Current Length of Stay: 7 day(s)    Current Patient Status: Inpatient       Code Status: Level 1 - Full Code

## 2018-10-28 VITALS
TEMPERATURE: 97.6 F | RESPIRATION RATE: 18 BRPM | OXYGEN SATURATION: 91 % | DIASTOLIC BLOOD PRESSURE: 80 MMHG | SYSTOLIC BLOOD PRESSURE: 148 MMHG | HEART RATE: 75 BPM

## 2018-10-28 PROBLEM — A41.9 SEPSIS (HCC): Status: RESOLVED | Noted: 2018-10-19 | Resolved: 2018-10-28

## 2018-10-28 PROBLEM — G93.40 ACUTE ENCEPHALOPATHY: Chronic | Status: RESOLVED | Noted: 2018-10-19 | Resolved: 2018-10-28

## 2018-10-28 LAB
ANION GAP SERPL CALCULATED.3IONS-SCNC: 7 MMOL/L (ref 4–13)
BUN SERPL-MCNC: 7 MG/DL (ref 5–25)
CALCIUM SERPL-MCNC: 9.7 MG/DL (ref 8.3–10.1)
CHLORIDE SERPL-SCNC: 102 MMOL/L (ref 100–108)
CO2 SERPL-SCNC: 30 MMOL/L (ref 21–32)
CREAT SERPL-MCNC: 0.84 MG/DL (ref 0.6–1.3)
ERYTHROCYTE [DISTWIDTH] IN BLOOD BY AUTOMATED COUNT: 13 % (ref 11.6–15.1)
GFR SERPL CREATININE-BSD FRML MDRD: 70 ML/MIN/1.73SQ M
GLUCOSE SERPL-MCNC: 112 MG/DL (ref 65–140)
GLUCOSE SERPL-MCNC: 131 MG/DL (ref 65–140)
HCT VFR BLD AUTO: 34.8 % (ref 34.8–46.1)
HGB BLD-MCNC: 12 G/DL (ref 11.5–15.4)
HSV1 DNA SPEC QL NAA+PROBE: NEGATIVE
HSV2 DNA SPEC QL NAA+PROBE: NEGATIVE
MCH RBC QN AUTO: 30.2 PG (ref 26.8–34.3)
MCHC RBC AUTO-ENTMCNC: 34.5 G/DL (ref 31.4–37.4)
MCV RBC AUTO: 88 FL (ref 82–98)
PLATELET # BLD AUTO: 241 THOUSANDS/UL (ref 149–390)
PMV BLD AUTO: 9.9 FL (ref 8.9–12.7)
POTASSIUM SERPL-SCNC: 3.3 MMOL/L (ref 3.5–5.3)
RBC # BLD AUTO: 3.97 MILLION/UL (ref 3.81–5.12)
SODIUM SERPL-SCNC: 139 MMOL/L (ref 136–145)
WBC # BLD AUTO: 9.48 THOUSAND/UL (ref 4.31–10.16)

## 2018-10-28 PROCEDURE — 99239 HOSP IP/OBS DSCHRG MGMT >30: CPT | Performed by: INTERNAL MEDICINE

## 2018-10-28 PROCEDURE — 80048 BASIC METABOLIC PNL TOTAL CA: CPT | Performed by: INTERNAL MEDICINE

## 2018-10-28 PROCEDURE — 82948 REAGENT STRIP/BLOOD GLUCOSE: CPT

## 2018-10-28 PROCEDURE — 85027 COMPLETE CBC AUTOMATED: CPT | Performed by: INTERNAL MEDICINE

## 2018-10-28 RX ORDER — POTASSIUM CHLORIDE 20 MEQ/1
40 TABLET, EXTENDED RELEASE ORAL
Qty: 90 TABLET | Refills: 1 | Status: SHIPPED | OUTPATIENT
Start: 2018-10-28

## 2018-10-28 RX ORDER — AMLODIPINE BESYLATE 5 MG/1
5 TABLET ORAL DAILY
Qty: 30 TABLET | Refills: 3 | Status: SHIPPED | OUTPATIENT
Start: 2018-10-28

## 2018-10-28 RX ADMIN — ACYCLOVIR SODIUM 525 MG: 50 INJECTION, SOLUTION INTRAVENOUS at 05:34

## 2018-10-28 RX ADMIN — AMLODIPINE BESYLATE 5 MG: 5 TABLET ORAL at 09:37

## 2018-10-28 RX ADMIN — SODIUM CHLORIDE 75 ML/HR: 0.9 INJECTION, SOLUTION INTRAVENOUS at 05:34

## 2018-10-28 RX ADMIN — Medication 100 MG: at 09:37

## 2018-10-28 RX ADMIN — POTASSIUM CHLORIDE 40 MEQ: 1500 TABLET, EXTENDED RELEASE ORAL at 09:37

## 2018-10-28 RX ADMIN — DILTIAZEM HYDROCHLORIDE 240 MG: 240 CAPSULE, COATED, EXTENDED RELEASE ORAL at 09:37

## 2018-10-28 RX ADMIN — LOSARTAN POTASSIUM 100 MG: 50 TABLET ORAL at 09:37

## 2018-10-28 RX ADMIN — ENOXAPARIN SODIUM 40 MG: 40 INJECTION SUBCUTANEOUS at 09:37

## 2018-10-28 NOTE — PROGRESS NOTES
[facility] Progress Note  Patient: Billy Ortiz 68 y o  female   MRN: [id number]  PCP: Nayely Guido  Unit/Bed#: [facility] [address]01 Encounter: [id number]  Date Of Visit: 10/28/18      Assessment/plan  1  Severe sepsis poa due to aseptic meningitis- appreciate ID recommendations  There was concern for bacterial infection as one of the blood cultures was positive for gram positive bacteremia that ended up being lactococcus which is a skin contaminant  Due to this she was treated originally with vanco/cefepime and acyclovir  Vanco/cefepime have been d/kush she was continued on acyclovir  Pt is s/p LP on 10/20  LP results are negative  Please see number 2       2  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  Appreciate neurology recommendations and ID  Possibly HSV but LP was negative  Question if sample was obtained too early  Repeat LP due to visual hallucinations that could be from acyclovir to see if this truly is HSV and whether we can stop acyclovir  She is s/p the repeat LP yesterday  HSV is negative  [place] is pending       3  Visual hallucinations- questionable etiology  Possibly due to acyclovir vrs meningitis vrs seizures vs cushing vrs medications- repeat mri negative  eeg is negative  hsv is negative  Hallucinations have resolved  Possibly this could be due to korlym  Doubt acyclovir since it resolved while on this medication  Doubt seizure as eeg was negative as she was having hallucinations  Possibly due to cushings see number 4       4  Cushing disease- pt is htn and hypokalemic  Pt was on korlym outpt for less than 3 weeks  She has never tried any other medications prior to this  She did have a lot of side effects from this medication  Question if pt truly has cushings or if cortisol level was due to stress reaction  Mri negative for pituitary adenoma and ct negative for adrenal ademona  She could still have micro adenomas  Would need to obtain work up outpt   Would also need to see if pt was rulled out for gitelmans and other tubular kidney diseases  Since pt is stable she can follow up outpt with endocrinology and also nephrology    5  htn accelerated due to cushings- bp stable with medication adjustment       6  Hypokalemia- due to cushings  Continue replacement       7  Type 2 diabetes- a1c is 5 4  Continue insulin sliding scale  Pt was on metformin 1000mg bid  Will not restart  Will have pt monitor blood glucose at home  If she notices her blood glucose in the 200s to restart metformin at 500mg bid  Also recommend repeat a1c in 3 to 4 months       8  hypomg-resolved       9  Dyspnea- resolved  dispo- d/c to home with follow up outpt  Pt refused hhpt  Subjective:   Pt seen and examined  Pt has been on the korlym for approximately 3 weeks  She has had increased stress in her life  She works 60-80 hours a week  She lost her  last year  She has been falling due to the stress  She denies any further visual hallucinations or headaches  No blurred or double vision  No f/c no cp no sob no n/v/d no abd pain    Objective:     Vitals: Blood pressure 148/80, pulse 75, temperature 97 6 °F (36 4 °C), temperature source Temporal, resp  rate 18, SpO2 91 %  ,There is no height or weight on file to calculate BMI  Lab, Imaging and other studies:    Results from last 7 days  Lab Units 10/28/18  0532   WBC Thousand/uL 9 48   HEMOGLOBIN g/dL 12 0   HEMATOCRIT % 34 8   PLATELETS Thousands/uL 241       Results from last 7 days  Lab Units 10/28/18  0532  10/22/18  0508   SODIUM mmol/L 139  < > 143   POTASSIUM mmol/L 3 3*  < > 2 8*   CHLORIDE mmol/L 102  < > 105   CO2 mmol/L 30  < > 31   BUN mg/dL 7  < > 5   CREATININE mg/dL 0 84  < > 0 87   CALCIUM mg/dL 9 7  < > 9 3   ALK PHOS U/L  --   --  31*   ALT U/L  --   --  12   AST U/L  --   --  10   < > = values in this interval not displayed  Lab Results   Component Value Date    BLOODCX No Growth After 5 Days   10/19/2018    BLOODCX Lactococcus garvieae (A) 10/19/2018    BLOODCX Staphylococcus coagulase negative (A) 10/19/2018    BLOODCX Pantoea agglomerans (A) 10/19/2018    URINECX 10,000-19,000 cfu/ml  10/19/2018    URINECX <1000 cfu/ml Mixed Contaminants X2 01/29/2017     Scheduled Meds:   Current Facility-Administered Medications:  acetaminophen 650 mg Oral Q6H PRN Delvin Meeks MD    amLODIPine 5 mg Oral Daily Sammie Yung, DO    diltiazem 240 mg Oral Daily Delvin Meeks MD    enoxaparin 40 mg Subcutaneous Q24H [address] Delvin Meeks MD    hydrALAZINE 10 mg Intravenous Q6H PRN Gini Mcdermott DO    insulin lispro 1-5 Units Subcutaneous TID AC Delvin Meeks MD    labetalol 10 mg Intravenous Q4H PRN Sammie Ambron, DO    losartan 100 mg Oral Daily Sammie Ambron, DO    melatonin 3 mg Oral HS Sammie Ambron, DO    ondansetron 4 mg Intravenous Q4H PRN Delvin Meeks MD    potassium chloride 40 mEq Oral TID With Meals Sammie Ambron, DO    sodium chloride 75 mL/hr Intravenous Continuous Sammie Ambron, DO Last Rate: 75 mL/hr (10/28/18 0534)   thiamine 100 mg Oral Daily Delvin Meeks MD      Continuous Infusions:   sodium chloride 75 mL/hr Last Rate: 75 mL/hr (10/28/18 0534)     PRN Meds:   acetaminophen    hydrALAZINE    labetalol    ondansetron      Physical exam:  Physical Exam  General appearance: alert and oriented, in no acute distress  Head: Normocephalic, without obvious abnormality, atraumatic  Eyes: conjunctivae/corneas clear  PERRL, EOM's intact  Fundi benign    Neck: no adenopathy, no carotid bruit, no JVD, supple, symmetrical, trachea midline and thyroid not enlarged, symmetric, no tenderness/mass/nodules  Lungs: clear to auscultation bilaterally  Heart: regular rate and rhythm, S1, S2 normal, no murmur, click, rub or gallop  Abdomen: soft, non-tender; bowel sounds normal; no masses,  no organomegaly  Extremities: extremities normal, warm and well-perfused; no cyanosis, clubbing, or edema  Pulses: 2+ and symmetric  Skin: Skin color, texture, turgor normal  No rashes or lesions  Neurologic: Grossly normal

## 2018-10-28 NOTE — PLAN OF CARE
CARDIOVASCULAR - ADULT     Maintains optimal cardiac output and hemodynamic stability Adequate for Discharge     Absence of cardiac dysrhythmias or at baseline rhythm Adequate for Discharge        DISCHARGE PLANNING     Discharge to home or other facility with appropriate resources Adequate for Discharge        DISCHARGE PLANNING - CARE MANAGEMENT     Discharge to post-acute care or home with appropriate resources Adequate for Discharge        INFECTION - ADULT     Absence or prevention of progression during hospitalization Adequate for Discharge     Absence of fever/infection during neutropenic period Adequate for Discharge        Knowledge Deficit     Patient/family/caregiver demonstrates understanding of disease process, treatment plan, medications, and discharge instructions Adequate for Discharge        MUSCULOSKELETAL - ADULT     Maintain or return mobility to safest level of function Adequate for Discharge     Maintain proper alignment of affected body part Adequate for Discharge        NEUROSENSORY - ADULT     Achieves stable or improved neurological status Adequate for Discharge     Absence of seizures Adequate for Discharge     Remains free of injury related to seizures activity Adequate for Discharge     Achieves maximal functionality and self care Adequate for Discharge        Potential for Falls     Patient will remain free of falls Adequate for Discharge        Prexisting or High Potential for Compromised Skin Integrity     Skin integrity is maintained or improved Adequate for Discharge        SAFETY ADULT     Maintain or return to baseline ADL function Adequate for Discharge     Maintain or return mobility status to optimal level Adequate for Discharge        SKIN/TISSUE INTEGRITY - ADULT     Skin integrity remains intact Adequate for Discharge     Incision(s), wounds(s) or drain site(s) healing without S/S of infection Adequate for Discharge     Oral mucous membranes remain intact Adequate for Discharge

## 2018-10-28 NOTE — NURSING NOTE
IV removed prior to discharge  AVS reviewed with patient and daughter in room  Pt verbalized understanding of AVS   Provided patient with avs and written scripts and work note  Pt discharged with her belongings

## 2018-10-28 NOTE — DISCHARGE INSTRUCTIONS
Lumbar Puncture     WHAT YOU NEED TO KNOW:   Lumbar puncture (LP) is a procedure in which a needle is inserted in your back and into your spinal canal  This is usually done to collect cerebrospinal fluid (CSF) to check for an infection, inflammation, bleeding, or other conditions that affect the brain  CSF is a clear, protective fluid that flows around the brain and inside the spinal canal  LP may also be done to remove CSF to reduce pressure in the brain  DISCHARGE INSTRUCTIONS:     Follow up with your healthcare provider as directed: Write down your questions so you remember to ask them during your visits  Post-lumbar puncture headache: You may develop a headache during the first few hours after your LP that may last for several days  The headache may be mild to severe and may get worse when you sit or stand  The following may help ease a post-lumbar puncture headache:  · Drink plenty of liquids: You should drink more liquid than usual after your LP  Ask how much liquid is right for you  Caffeine may be used to treat a headache  Drinks, such as coffee, tea, or some sodas, have caffeine  Ask a Do not drink alcohol  · Lie down: If you have a headache after your lumbar puncture, it may be helpful to lie down and rest   · You may have a slight soreness over the LP area  This is normal   · Remove the band aid or dressing in 24 hours  · Contact Interventional Radiology imediately  at [phone] [place] PATIENTS: Contact Interventional Radiology at [phone]) Arie Kauffman PATIENTS: Contact Interventional Radiology at [phone]) if any of the following occur:  · You have a severe headache that does not get better after you lie down  · Persistent nausea or vomiting   · You have a fever  · You have a stiff neck or have trouble thinking clearly  · Your legs, feet, or other parts below the waist feel numb, tingly, or weak     · You have bleeding or a discharge coming from the area where the needle was put into your back  · You have severe pain in your back or neck  For your diabetes: your a1c was 5 4 on the metformin  Would at this point hold the metformin and repeat a1c in 3 to 4 months to avoid hypoglycemia (low blood sugar)  Please check your blood sugar before each meal and at night before you go to bed  Please take these recordings with you to your next doctors appointment  If you notice that your blood glucose is a trend of greater than 200 restart metformin at 500mg twice a day

## 2018-10-28 NOTE — DISCHARGE SUMMARY
Discharge Summary - [facility] Internal Medicine    Patient Information: Patricia Aguilera 68 y o  female MRN: [id number]  [facility] [id number] Encounter: [id number]    Discharging Physician / Practitioner: Cary Gross DO  PCP: Leonidas Calabrese  Admission Date: 10/20/2018  Discharge Date: 10/28/18    Disposition:     Home     Reason for Admission: acute encephalopathy    Discharge Diagnoses:     Principal Problem (Resolved):    Acute encephalopathy  Active Problems:    Diabetes mellitus ([facility] [facility] )    Hypertension    Hypokalemia    Cushing's disease ([facility] )  Resolved Problems:    Sepsis ([facility] )      Consultations During Hospital Stay:  · ID  · Neurology     Procedures Performed:     · Lumbar puncture x2    Significant Findings / Test Results:   Ct Abdomen Pelvis Wo Contrast  Result Date: 10/19/2018  Impression: Artifact from the patient's arms  Imaging is degraded by patient motion, with resultant artifact  The best possible images were obtained  Perinephric stranding is identified bilaterally, of unknown chronicity and significance  There is no evidence of urolithiasis hydronephrosis or hydroureter  The bowel is nonobstructed  A cyst identified right ovary, unexpected in a woman of 73 years  This is indeterminate  Roge Angst Chest Pa & Lateral  Result Date: 10/24/2018  Impression: Small bilateral pleural effusions and associated bibasilar atelectasis are new  Ct Head Without Contrast  Result Date: 10/19/2018  Impression: Normal study      Mri Brain W Wo Contrast  Result Date: 10/25/2018  Impression: 1  No acute infarction, intracranial hemorrhage or mass  2   Mild, chronic microangiopathy  Mri Brain W Wo Contrast  Result Date: 10/21/2018  Impression: 1  Mild, chronic microangiopathy  2   No acute infarction, intracranial hemorrhage or mass  3   Scattered mild to moderate sinusitis  Xr Chest 1 View  Result Date: 10/19/2018  Impression: Normal study          Incidental Findings:   · none    Test Results Pending at Discharge (will require follow up): · [place] virus pending     Outpatient Tests Requested:  Bmp in 1 to 2 days  Hospital Course:     Willis Mercer is a 68 y o  female patient who originally presented to the hospital on 10/20/2018 due to Severe sepsis poa due to aseptic meningitis  She was evaluated by ID  There was concern for bacterial infection as one of the blood cultures was positive for gram positive bacteremia that ended up being lactococcus which is a skin contaminant  Due to this she was treated originally with vanco/cefepime and acyclovir  Vanco/cefepime have been d/kush she was continued on acyclovir  Pt is s/p LP on 10/20  LP results are negative  She had a repeat LP in which HSV was negative also  [place] virus is pending  Pt is at baseline  Her acyclovir was d/kush  Pt did have visual hallucinations  Question the etiology of this  Possibly due to acyclovir vrs meningitis vrs seizures vs cushing vrs medications  She had an mri x2 that were negative  A eeg was negative  hsv is negative  Hallucinations have resolved  Possibly this could be due to [facility] as it has a long half life  Doubt acyclovir since it resolved while on this medication  Possibly due to cushings see number 4  Pt was diagnosed with cushings disease  Do not have access to record of work up  Do know her mri was negative for pituatary adenoma and ct negative for adrenal adenoma  She did not have surgical sampling to r/o microscopic andenomas  However she was started on korlym first  Question if cortisol level could be high due to recent stress (she works 60-80 hours a week and her   recently)  She does have symptoms in the sense of htn, hypokalemia, and alopecia  Question if she was ruled out for giltemans and tublar diseases  As [place] may have played apart in her recent illness/encephalopathy have recommended that she gets a second opionion about cushings  She wanted to do follow up in the [facility] system  Since her bp is stable and she is on potassium replacement this seems reasonable  Also recommend that she follows up with nephrology outpt  Of not her cortisol level was higher in the hospital but margaret was still in her system and it could be stress response to the recent illness  Did not repeat cushings work up in the hospital as again could have a false positive due to stress response  She will follow up outpt  In regards to her htn which was accelerated  She was restarted on losartan and cardizem  Did add norvasc to better control her bp  One could consider switching norvasc to aldactone for the potassium benefit  Pt does have hypokalemia  She is on replacement  Her k is 3 3 at discharge  She is on 40meq tid  Even with high doses she would drop back down to the 2 6-2 9  She will need repeat bmp in 1 to 2 days to see if potassium needs to be adjusted  She has type 2 diabetes  She is metformin 1000mg bid  Her a1c is 5 4 I have recommend that she does not take metformin for now and repeat a1c in 3 to 4 months  Also recommended her to monitor her blood glucose at home  If she starts to see a trend of blood glucose greater than 200 to restart metformin at 500mg bid  She refused hhpt  She was discharged to home with follow up with nephrology and endocrinology     Discharge Day Visit / Exam:     * Please refer to separate progress note for these details *    Discharge instructions/Information to patient and family:   See after visit summary for information provided to patient and family  Provisions for Follow-Up Care:  See after visit summary for information related to follow-up care and any pertinent home health orders  Discharge Statement:  I spent 40 minutes discharging the patient  This time was spent on the day of discharge  I had direct contact with the patient on the day of discharge   Greater than 50% of the total time was spent examining patient, answering all patient questions, arranging and discussing plan of care with patient as well as directly providing post-discharge instructions  Additional time then spent on discharge activities  Discharge Medications:  See after visit summary for reconciled discharge medications provided to patient and family

## 2018-10-30 LAB — ACTH PLAS-MCNC: 37.4 PG/ML (ref 7.2–63.3)

## 2018-11-01 ENCOUNTER — APPOINTMENT (OUTPATIENT)
Dept: LAB | Facility: CLINIC | Age: 73
End: 2018-11-01
Payer: COMMERCIAL

## 2018-11-01 ENCOUNTER — TRANSCRIBE ORDERS (OUTPATIENT)
Dept: LAB | Facility: CLINIC | Age: 73
End: 2018-11-01

## 2018-11-01 DIAGNOSIS — R53.83 FATIGUE, UNSPECIFIED TYPE: ICD-10-CM

## 2018-11-01 DIAGNOSIS — E78.00 PURE HYPERCHOLESTEROLEMIA: ICD-10-CM

## 2018-11-01 DIAGNOSIS — I10 ESSENTIAL HYPERTENSION, MALIGNANT: ICD-10-CM

## 2018-11-01 DIAGNOSIS — E78.00 PURE HYPERCHOLESTEROLEMIA: Primary | ICD-10-CM

## 2018-11-01 LAB
ALBUMIN SERPL BCP-MCNC: 3.9 G/DL (ref 3.5–5)
ALP SERPL-CCNC: 76 U/L (ref 46–116)
ALT SERPL W P-5'-P-CCNC: 36 U/L (ref 12–78)
ANION GAP SERPL CALCULATED.3IONS-SCNC: 4 MMOL/L (ref 4–13)
AST SERPL W P-5'-P-CCNC: 21 U/L (ref 5–45)
BASOPHILS # BLD AUTO: 0.06 THOUSANDS/ΜL (ref 0–0.1)
BASOPHILS NFR BLD AUTO: 1 % (ref 0–1)
BILIRUB SERPL-MCNC: 0.56 MG/DL (ref 0.2–1)
BUN SERPL-MCNC: 21 MG/DL (ref 5–25)
CALCIUM ALBUM COR SERPL-MCNC: 11.5 MG/DL (ref 8.3–10.1)
CALCIUM SERPL-MCNC: 11.4 MG/DL (ref 8.3–10.1)
CHLORIDE SERPL-SCNC: 102 MMOL/L (ref 100–108)
CO2 SERPL-SCNC: 29 MMOL/L (ref 21–32)
CREAT SERPL-MCNC: 1.33 MG/DL (ref 0.6–1.3)
EOSINOPHIL # BLD AUTO: 0.21 THOUSAND/ΜL (ref 0–0.61)
EOSINOPHIL NFR BLD AUTO: 2 % (ref 0–6)
ERYTHROCYTE [DISTWIDTH] IN BLOOD BY AUTOMATED COUNT: 13.3 % (ref 11.6–15.1)
GFR SERPL CREATININE-BSD FRML MDRD: 40 ML/MIN/1.73SQ M
GLUCOSE P FAST SERPL-MCNC: 110 MG/DL (ref 65–99)
HCT VFR BLD AUTO: 39.3 % (ref 34.8–46.1)
HGB BLD-MCNC: 12.9 G/DL (ref 11.5–15.4)
IMM GRANULOCYTES # BLD AUTO: 0.06 THOUSAND/UL (ref 0–0.2)
IMM GRANULOCYTES NFR BLD AUTO: 1 % (ref 0–2)
LYMPHOCYTES # BLD AUTO: 3.37 THOUSANDS/ΜL (ref 0.6–4.47)
LYMPHOCYTES NFR BLD AUTO: 35 % (ref 14–44)
MAGNESIUM SERPL-MCNC: 2.2 MG/DL (ref 1.6–2.6)
MCH RBC QN AUTO: 30.3 PG (ref 26.8–34.3)
MCHC RBC AUTO-ENTMCNC: 32.8 G/DL (ref 31.4–37.4)
MCV RBC AUTO: 92 FL (ref 82–98)
MONOCYTES # BLD AUTO: 0.77 THOUSAND/ΜL (ref 0.17–1.22)
MONOCYTES NFR BLD AUTO: 8 % (ref 4–12)
NEUTROPHILS # BLD AUTO: 5.22 THOUSANDS/ΜL (ref 1.85–7.62)
NEUTS SEG NFR BLD AUTO: 53 % (ref 43–75)
NRBC BLD AUTO-RTO: 0 /100 WBCS
PLATELET # BLD AUTO: 312 THOUSANDS/UL (ref 149–390)
PMV BLD AUTO: 11.1 FL (ref 8.9–12.7)
POTASSIUM SERPL-SCNC: 4.7 MMOL/L (ref 3.5–5.3)
PROT SERPL-MCNC: 7.5 G/DL (ref 6.4–8.2)
RBC # BLD AUTO: 4.26 MILLION/UL (ref 3.81–5.12)
SODIUM SERPL-SCNC: 135 MMOL/L (ref 136–145)
WBC # BLD AUTO: 9.69 THOUSAND/UL (ref 4.31–10.16)

## 2018-11-01 PROCEDURE — 83735 ASSAY OF MAGNESIUM: CPT

## 2018-11-01 PROCEDURE — 82533 TOTAL CORTISOL: CPT

## 2018-11-01 PROCEDURE — 36415 COLL VENOUS BLD VENIPUNCTURE: CPT

## 2018-11-01 PROCEDURE — 80053 COMPREHEN METABOLIC PANEL: CPT

## 2018-11-01 PROCEDURE — 85025 COMPLETE CBC W/AUTO DIFF WBC: CPT

## 2018-11-02 LAB
CORTIS SERPL-MCNC: 19 UG/DL
PCR AMPLIFICATION + DETECTION: NORMAL
WNV RNA SPEC QL NAA+PROBE: NEGATIVE

## 2020-09-18 ENCOUNTER — HOSPITAL ENCOUNTER (OUTPATIENT)
Facility: HOSPITAL | Age: 75
Setting detail: SURGERY ADMIT
End: 2020-09-18
Attending: ORTHOPAEDIC SURGERY | Admitting: ORTHOPAEDIC SURGERY
Payer: COMMERCIAL

## 2021-01-27 DIAGNOSIS — Z23 ENCOUNTER FOR IMMUNIZATION: ICD-10-CM

## 2021-02-11 ENCOUNTER — APPOINTMENT (EMERGENCY)
Dept: RADIOLOGY | Facility: HOSPITAL | Age: 76
End: 2021-02-11
Payer: COMMERCIAL

## 2021-02-11 ENCOUNTER — APPOINTMENT (EMERGENCY)
Dept: CT IMAGING | Facility: HOSPITAL | Age: 76
End: 2021-02-11
Payer: COMMERCIAL

## 2021-02-11 ENCOUNTER — HOSPITAL ENCOUNTER (EMERGENCY)
Facility: HOSPITAL | Age: 76
Discharge: HOME | End: 2021-02-11
Attending: EMERGENCY MEDICINE | Admitting: EMERGENCY MEDICINE
Payer: COMMERCIAL

## 2021-02-11 VITALS
SYSTOLIC BLOOD PRESSURE: 180 MMHG | HEART RATE: 70 BPM | DIASTOLIC BLOOD PRESSURE: 82 MMHG | RESPIRATION RATE: 18 BRPM | OXYGEN SATURATION: 96 % | TEMPERATURE: 97.9 F

## 2021-02-11 DIAGNOSIS — S43.014A ANTERIOR DISLOCATION OF RIGHT SHOULDER, INITIAL ENCOUNTER: ICD-10-CM
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
## 2021-02-11 PROCEDURE — 96372 THER/PROPH/DIAG INJ SC/IM: CPT

## 2021-02-11 PROCEDURE — 73030 X-RAY EXAM OF SHOULDER: CPT

## 2021-02-11 PROCEDURE — 70450 CT HEAD/BRAIN W/O DYE: CPT

## 2021-02-11 PROCEDURE — 99284 EMERGENCY DEPT VISIT MOD MDM: CPT | Performed by: EMERGENCY MEDICINE

## 2021-02-11 PROCEDURE — 99284 EMERGENCY DEPT VISIT MOD MDM: CPT

## 2021-02-11 PROCEDURE — 72125 CT NECK SPINE W/O DYE: CPT

## 2021-02-11 PROCEDURE — 23650 CLTX SHO DSLC W/MNPJ WO ANES: CPT | Performed by: EMERGENCY MEDICINE

## 2021-02-11 RX ORDER — HYDROMORPHONE HCL/PF 1 MG/ML
1 SYRINGE (ML) INJECTION ONCE
Status: COMPLETED | OUTPATIENT
Start: 2021-02-11 | End: 2021-02-11

## 2021-02-11 RX ORDER — FENTANYL CITRATE 50 UG/ML
50 INJECTION, SOLUTION INTRAMUSCULAR; INTRAVENOUS ONCE
Status: DISCONTINUED | OUTPATIENT
Start: 2021-02-11 | End: 2021-02-11

## 2021-02-11 RX ORDER — LIDOCAINE HYDROCHLORIDE 20 MG/ML
10 INJECTION, SOLUTION EPIDURAL; INFILTRATION; INTRACAUDAL; PERINEURAL ONCE
Status: COMPLETED | OUTPATIENT
Start: 2021-02-11 | End: 2021-02-11

## 2021-02-11 RX ADMIN — HYDROMORPHONE HYDROCHLORIDE 1 MG: 1 INJECTION, SOLUTION INTRAMUSCULAR; INTRAVENOUS; SUBCUTANEOUS at 20:53

## 2021-02-11 RX ADMIN — LIDOCAINE HYDROCHLORIDE 10 ML: 20 INJECTION, SOLUTION EPIDURAL; INFILTRATION; INTRACAUDAL; PERINEURAL at 21:40

## 2021-02-12 NOTE — ED PROVIDER NOTES
Pt Name: Joaquim Gallagher  MRN: [id number]  [place] 1945  Age/Sex: 77 y o  female  Date of evaluation: 2/11/2021  PCP: Gladys Jeffers    Chief Complaint   Patient presents with    Arm Injury     Pt reports falling into right arm and reports pain in middler arm up into shoulder  Pt denies numbness/tingling  HPI    77 y o  female presenting with fall  Patient states she tripped over a step next to her bar, and fell on her right shoulder  States she did not strike her head on the fall, however a chair fell on her head  She is on Plavix  Denies loss of consciousness  No anticoagulants  Complains primarily of right shoulder pain  Denies visual changes, numbness/tingling, nausea, vomiting  Past Medical and Surgical History    Past Medical History:   Diagnosis Date    [facility] )     [facility]ushing's disease [facility] )     Diabetes mellitus ([facility] )     Hypertension     Kidney stones     Migraine     Renal disorder     Rotator cuff injury 01/2017    bilat       Past Surgical History:   Procedure Laterality Date    HYSTERECTOMY      IR LUMBAR PUNCTURE  10/25/2018       Family History   Problem Relation Age of Onset    Heart disease Mother        Social History     Tobacco Use    Smoking status: Never Smoker    Smokeless tobacco: Never Used   Substance Use Topics    Alcohol use: No    Drug use: No           Allergies    No Known Allergies    Home Medications    Prior to Admission medications    Medication Sig Start Date End Date Taking?  Authorizing Provider   amLODIPine (NORVASC) 5 mg tablet Take 1 tablet (5 mg total) by mouth daily 10/28/18   Sammie Yung DO   aspirin (ECOTRIN LOW STRENGTH) 81 mg EC tablet Take 81 mg by mouth daily    Historical Provider, MD   atorvastatin (LIPITOR) 20 mg tablet Take 20 mg by mouth daily    Historical Provider, MD   clopidogrel (PLAVIX) 75 mg tablet Take 75 mg by mouth daily    Historical Provider, MD   diltiazem (DILACOR XR) 240 MG 24 hr capsule Take 240 mg by mouth daily    Historical Provider, MD   losartan (COZAAR) 100 MG tablet Take 100 mg by mouth daily    Historical Provider, MD   potassium chloride (K-DUR,KLOR-CON) 20 mEq tablet Take 2 tablets (40 mEq total) by mouth 3 (three) times a day with meals 10/28/18   Rick Zheng DO           Review of Systems    Review of Systems   Constitutional: Negative for chills and fever  HENT: Negative for rhinorrhea and sore throat  Eyes: Negative for pain and visual disturbance  Respiratory: Negative for cough and shortness of breath  Cardiovascular: Negative for chest pain and leg swelling  Gastrointestinal: Negative for abdominal pain, nausea and vomiting  Genitourinary: Negative for dysuria and hematuria  Musculoskeletal: Negative for back pain  Right shoulder pain   Skin: Negative for rash and wound  Neurological: Negative for syncope and headaches  Physical Exam      ED Triage Vitals   Temperature Pulse Respirations Blood Pressure SpO2   02/11/21 2025 02/11/21 2027 02/11/21 2027 02/11/21 2027 02/11/21 2027   97 9 °F (36 6 °C) 77 18 (!) 226/99 100 %      Temp Source Heart Rate Source Patient Position - Orthostatic VS BP Location FiO2 (%)   02/11/21 2025 02/11/21 2027 02/11/21 2027 02/11/21 2027 --   Oral Monitor Sitting Right arm       Pain Score       02/11/21 2053       Worst Possible Pain               Physical Exam  Constitutional:       General: She is not in acute distress  Appearance: She is not ill-appearing  Comments: Patient complaining of pain   HENT:      Head: Normocephalic and atraumatic  Nose: Nose normal    Eyes:      Extraocular Movements: Extraocular movements intact  Pupils: Pupils are equal, round, and reactive to light  Neck:      Musculoskeletal: Normal range of motion and neck supple  Cardiovascular:      Rate and Rhythm: Normal rate and regular rhythm        Comments: Radial pulses intact  Pulmonary:      Effort: No respiratory distress  Breath sounds: Normal breath sounds  No wheezing  Abdominal:      General: Abdomen is flat  There is no distension  Tenderness: There is no abdominal tenderness  Musculoskeletal:         General: No swelling  Comments: Right proximal shoulder tenderness to palpation, compartment soft she, full range of motion at right elbow and right wrist   Skin:     General: Skin is warm  Findings: No erythema  Neurological:      Mental Status: She is alert and oriented to person, place, and time  Mental status is at baseline  Sensory: No sensory deficit  Diagnostic Results      Labs:    Results Reviewed     None          All labs reviewed and utilized in the medical decision making process    Radiology:    CT head without contrast   Final Result      No intracranial hemorrhage or calvarial fracture  Workstation performed: [id number]         CT cervical spine without contrast   Final Result      No cervical spine fracture or traumatic malalignment        Workstation performed: [id number]         XR shoulder 2+ views RIGHT    (Results Pending)   XR shoulder 2+ views RIGHT    (Results Pending)       All radiology studies independently viewed by me and interpreted by the radiologist     Procedure    Orthopedic injury treatment    Date/Time: 2/11/2021 11:00 PM  Performed by: Franc Monteiro DO  Authorized by: Franc Monteiro DO     Patient Location:  ED  Other Assisting Provider: Yes (comment) Katina Ward)    Verbal consent obtained?: Yes    Risks and benefits: Risks, benefits and alternatives were discussed    Consent given by:  Patient  Patient states understanding of procedure being performed: Yes    Patient's understanding of procedure matches consent: Yes    Patient identity confirmed:  Arm band and verbally with patient  Injury location:  Shoulder  Location details:  Right shoulder  Injury type:  Dislocation  Dislocation type: anterior Chronicity:  New  Distal perfusion: normal    Neurological function: normal    Range of motion: reduced    Local anesthesia used?: No    General anesthesia used?: No    Manipulation performed?: Yes    Reduction method: Cunningham Maneuver  Reduction method: Cunningham Maneuver  Reduction method: Cunningham Maneuver  Reduction method: Cunningham Maneuver  Reduction method: Cunningham Maneuver  Reduction method: Cunningham Maneuver  Reduction successful?: Yes    Confirmation: Reduction confirmed by x-ray    Immobilization:  Sling  Distal perfusion: normal    Neurological function: normal    Range of motion: normal    Patient tolerance:  Patient tolerated the procedure well with no immediate complications            MDM    Patient hypertensive, this is likely due to pain, will give pain medication  Will obtain CT head and cervical spine, will obtain right shoulder x-ray  Shoulder x-ray was concerning for dislocation, it was reduced at bedside without any issues, patient felt much better after shoulder reduction  No acute traumatic findings on CT scans  Right upper extremity placed in sling  Advised follow-up with Orthopedics, ED return precautions discussed, patient verbalized understanding and is in agreement with this plan  Medications   lidocaine (PF) (XYLOCAINE-MPF) 2 % injection 10 mL (has no administration in time range)   HYDROmorphone (DILAUDID) injection 1 mg (1 mg Intramuscular Given 2/11/21 2053)           FINAL IMPRESSION    Final diagnoses:   Fall, initial encounter   Anterior dislocation of right shoulder, initial encounter         DISPOSITION    Time reflects when diagnosis was documented in both MDM as applicable and the Disposition within this note     Time User Action Codes Description Comment    2/11/2021 10:32 PM Caron Prom Add [W19  KRIR] Fall, initial encounter     2/11/2021 10:33 PM Caron Prom Add [Z41 [id number]] Anterior dislocation of right shoulder, initial encounter ED Disposition     ED Disposition Condition Date/Time Comment    Discharge Stable Thu Feb 11, 2021 10:32 PM Tavo Richard discharge to home/self care  Follow-up Information     Follow up With Specialties Details [address], DO Sports Medicine Schedule an appointment as soon as possible for a visit in 1 week  [address]  [facility] [address]  [phone]              PATIENT REFERRED TO:    Briana Grullon DO  [address]  [facility]  [phone]    Schedule an appointment as soon as possible for a visit in 1 week        DISCHARGE MEDICATIONS:    Patient's Medications   Discharge Prescriptions    No medications on file       No discharge procedures on file  Sybil Dai DO        This note was partially completed using voice recognition technology, and was scanned for gross errors; however some errors may still exist  Please contact the author with any questions or requests for clarification        Sybil Dai DO  02/11/21 9116

## 2021-02-12 NOTE — DISCHARGE INSTRUCTIONS
Please take Tylenol and ibuprofen as needed for pain  Wear the sling  Follow up with Orthopedics  Return to the emergency department for any new or worsening symptoms

## 2021-02-21 ENCOUNTER — TELEPHONE (OUTPATIENT)
Dept: OTHER | Facility: OTHER | Age: 76
End: 2021-02-21

## 2021-02-21 NOTE — TELEPHONE ENCOUNTER
Patient called and cancelled appointment will call back to reschedule
Otc Regimen: Gly/sal face wash 10-2
Render In Strict Bullet Format?: No
Continue Regimen: Tretinoin 0.05% cream apply pea size amount to face daily  in the PM \\nBenzaclin in the AM\\nDoxycycline once daily
Detail Level: Zone

## 2021-02-24 ENCOUNTER — APPOINTMENT (OUTPATIENT)
Dept: RADIOLOGY | Facility: CLINIC | Age: 76
End: 2021-02-24
Payer: COMMERCIAL

## 2021-02-24 ENCOUNTER — OFFICE VISIT (OUTPATIENT)
Dept: OBGYN CLINIC | Facility: CLINIC | Age: 76
End: 2021-02-24
Payer: COMMERCIAL

## 2021-02-24 VITALS
HEIGHT: 63 IN | SYSTOLIC BLOOD PRESSURE: 186 MMHG | BODY MASS INDEX: 34.55 KG/M2 | HEART RATE: 66 BPM | TEMPERATURE: 98.5 F | WEIGHT: 195 LBS | DIASTOLIC BLOOD PRESSURE: 77 MMHG

## 2021-02-24 DIAGNOSIS — S49.91XA SHOULDER INJURY, RIGHT, INITIAL ENCOUNTER: Primary | ICD-10-CM

## 2021-02-24 DIAGNOSIS — S49.91XA SHOULDER INJURY, RIGHT, INITIAL ENCOUNTER: ICD-10-CM

## 2021-02-24 DIAGNOSIS — S43.004D DISLOCATION OF RIGHT SHOULDER JOINT, SUBSEQUENT ENCOUNTER: ICD-10-CM

## 2021-02-24 PROCEDURE — 99204 OFFICE O/P NEW MOD 45 MIN: CPT | Performed by: FAMILY MEDICINE

## 2021-02-24 PROCEDURE — 73030 X-RAY EXAM OF SHOULDER: CPT

## 2021-02-24 NOTE — PATIENT INSTRUCTIONS
F/u 2 wks  Begin physical therapy  Gentle range of motion exercises as tolerated  Sling as needed  Stop Ibuprofen  May take Tylenol as needed  Icing as needed

## 2021-02-24 NOTE — PROGRESS NOTES
[facility] SPECIALISTS [place] [id number]  [facility] [id number] [phone]      Chief Complaint:  Chief Complaint   Patient presents with    Right Shoulder - Pain       Vitals:  BP (!) 186/77   Pulse 66   Temp 98 5 °F (36 9 °C)   Ht 5' 3" (1 6 m)   Wt 88 5 kg (195 lb)   BMI 34 54 kg/m²     The following portions of the patient's history were reviewed and updated as appropriate: allergies, current medications, past family history, past medical history, past social history, past surgical history, and problem list       Subjective:   Patient ID: Magaly Will is a 77 y o  female  Here c/o R shoulder pain   Seen in ER about 2 wks ago  XR showed shoulder disclocation- reduced by ER doctor  She tripped on steps and fell on her R shoulder  She is on plavix-  R shoulder bruised  Hurts to move the arm- reaching up/across/back  Achey pain  Was using sling- stopped using 3 days ago  Taking ibuprofen- counseled to stop  Better at rest     RIGHT SHOULDER     INDICATION:   post reduction  COMPARISON:  2/11/2021, 2031 hours     VIEWS:  XR SHOULDER 2+ VW RIGHT   Images: 3     FINDINGS:   Previous anterior shoulder dislocation has been reduced     There is no acute fracture     Moderate osteoarthritis of the glenohumeral and acromioclavicular joints  No lytic or blastic osseous lesion  Soft tissues are unremarkable  Impression:    Anatomic reduction of shoulder dislocation     Degenerative arthritis     No evidence of fracture           Review of Systems   Constitutional: Negative for fatigue and fever  Respiratory: Negative for shortness of breath  Cardiovascular: Negative for chest pain  Gastrointestinal: Negative for abdominal pain and nausea  Musculoskeletal: Positive for arthralgias  Skin: Negative for rash and wound  Neurological: Negative for weakness and headaches         Objective:  Right Shoulder Exam     Tenderness   Right shoulder tenderness location: ant shoulder joint TTP  Range of Motion   Active abduction:  100 abnormal   Passive abduction:  120 abnormal   External rotation: normal   Forward flexion:  100 abnormal   Internal rotation 0 degrees: normal   Internal rotation 90 degrees: normal     Muscle Strength   Abduction: 5/5   Internal rotation: 5/5   External rotation: 4/5   Supraspinatus: 5/5   Subscapularis: 5/5   Biceps: 5/5     Tests   Apprehension: negative  Aquino test: positive            Physical Exam  Vitals signs and nursing note reviewed  Constitutional:       Appearance: Normal appearance  She is well-developed  HENT:      Head: Normocephalic  Mouth/Throat:      Mouth: Mucous membranes are moist    Eyes:      Extraocular Movements: Extraocular movements intact  Neck:      Musculoskeletal: Normal range of motion  Cardiovascular:      Rate and Rhythm: Normal rate and regular rhythm  Heart sounds: Normal heart sounds  Pulmonary:      Effort: Pulmonary effort is normal       Breath sounds: Normal breath sounds  Abdominal:      General: Bowel sounds are normal       Palpations: Abdomen is soft  Musculoskeletal:         General: Tenderness present  Skin:     General: Skin is warm and dry  Neurological:      General: No focal deficit present  Mental Status: She is alert and oriented to person, place, and time  Psychiatric:         Mood and Affect: Mood normal          Behavior: Behavior normal          Thought Content: Thought content normal          I have personally reviewed pertinent films in PACS and my interpretation is XR- R shoulder no fx  Assessment/Plan:  Assessment/Plan   Diagnoses and all orders for this visit:    Shoulder injury, right, initial encounter  -     XR shoulder 2+ vw right; Future  -     Ambulatory referral to Physical Therapy;  Future    Dislocation of right shoulder joint, subsequent encounter  -     XR shoulder 2+ vw right; Future  -     Ambulatory referral to Physical Therapy; Future        Return in about 2 weeks (around 3/10/2021) for Recheck       Leatha Matos MD

## 2021-03-02 ENCOUNTER — IMMUNIZATIONS (OUTPATIENT)
Dept: FAMILY MEDICINE CLINIC | Facility: HOSPITAL | Age: 76
End: 2021-03-02

## 2021-03-02 DIAGNOSIS — Z23 ENCOUNTER FOR IMMUNIZATION: Primary | ICD-10-CM

## 2021-03-02 PROCEDURE — 91300 SARS-COV-2 / COVID-19 MRNA VACCINE (PFIZER-BIONTECH) 30 MCG: CPT

## 2021-03-02 PROCEDURE — 0001A SARS-COV-2 / COVID-19 MRNA VACCINE (PFIZER-BIONTECH) 30 MCG: CPT

## 2021-03-11 ENCOUNTER — OFFICE VISIT (OUTPATIENT)
Dept: OBGYN CLINIC | Facility: CLINIC | Age: 76
End: 2021-03-11
Payer: COMMERCIAL

## 2021-03-11 VITALS
WEIGHT: 195 LBS | BODY MASS INDEX: 34.55 KG/M2 | HEIGHT: 63 IN | DIASTOLIC BLOOD PRESSURE: 83 MMHG | HEART RATE: 72 BPM | SYSTOLIC BLOOD PRESSURE: 179 MMHG

## 2021-03-11 DIAGNOSIS — S46.012A ROTATOR CUFF STRAIN, LEFT, INITIAL ENCOUNTER: ICD-10-CM

## 2021-03-11 DIAGNOSIS — S43.004D DISLOCATION OF RIGHT SHOULDER JOINT, SUBSEQUENT ENCOUNTER: Primary | ICD-10-CM

## 2021-03-11 DIAGNOSIS — S49.91XD RIGHT SHOULDER INJURY, SUBSEQUENT ENCOUNTER: ICD-10-CM

## 2021-03-11 PROCEDURE — 99213 OFFICE O/P EST LOW 20 MIN: CPT | Performed by: FAMILY MEDICINE

## 2021-03-11 NOTE — PROGRESS NOTES
[facility] [place] [address]  [facility] [address] [phone]      Chief Complaint:  Chief Complaint   Patient presents with    Right Shoulder - Follow-up       Vitals:  BP (!) 179/83   Pulse 72   Ht 5' 3" (1 6 m)   Wt 88 5 kg (195 lb)   BMI 34 54 kg/m²     The following portions of the patient's history were reviewed and updated as appropriate: allergies, current medications, past family history, past medical history, past social history, past surgical history, and problem list       Subjective:   Patient ID: Joaquim Gallagher is a 77 y o  female  Here for f/u  R shoulder pain/dislocation  She has been doing home exercises but didn't start physical therapy yet  Will start next week  Hurts to move the arm- reaching up/across/back  Achey pain  Hurts to type and write  Taking tylenol  Better at rest       Review of Systems   Constitutional: Negative for fatigue and fever  Respiratory: Negative for shortness of breath  Cardiovascular: Negative for chest pain  Gastrointestinal: Negative for abdominal pain and nausea  Musculoskeletal: Positive for arthralgias  Skin: Negative for rash and wound  Neurological: Negative for weakness and headaches  Objective:  Right Shoulder Exam     Range of Motion   Active abduction: normal   Passive abduction: normal   Extension: normal   External rotation: normal   Forward flexion: normal   Internal rotation 0 degrees: normal   Internal rotation 90 degrees: normal     Muscle Strength   Abduction: 5/5   Internal rotation: 5/5   External rotation: 5/5   Supraspinatus: 5/5   Subscapularis: 5/5   Biceps: 5/5     Tests   Aquino test: positive    Comments:  Neg obriens test   Pos push off test  Pos empty can test            Physical Exam  Constitutional:       Appearance: Normal appearance  She is normal weight  HENT:      Head: Normocephalic     Eyes:      Extraocular Movements: Extraocular movements intact  Neck:      Musculoskeletal: Normal range of motion  Pulmonary:      Effort: Pulmonary effort is normal    Musculoskeletal:         General: Tenderness present  Skin:     General: Skin is warm and dry  Neurological:      General: No focal deficit present  Mental Status: She is alert and oriented to person, place, and time  Mental status is at baseline  Psychiatric:         Mood and Affect: Mood normal          Behavior: Behavior normal          Thought Content: Thought content normal          Judgment: Judgment normal                Assessment/Plan:  Assessment/Plan   Diagnoses and all orders for this visit:    Dislocation of right shoulder joint, subsequent encounter    Rotator cuff strain, left, initial encounter    Right shoulder injury, subsequent encounter        Return in about 3 weeks (around 4/1/2021) for Recheck       Tessa Trejo MD

## 2021-03-22 ENCOUNTER — OFFICE VISIT (OUTPATIENT)
Dept: OBGYN CLINIC | Facility: CLINIC | Age: 76
End: 2021-03-22
Payer: COMMERCIAL

## 2021-03-22 VITALS
WEIGHT: 195 LBS | DIASTOLIC BLOOD PRESSURE: 97 MMHG | HEIGHT: 63 IN | HEART RATE: 72 BPM | TEMPERATURE: 98.1 F | BODY MASS INDEX: 34.55 KG/M2 | SYSTOLIC BLOOD PRESSURE: 206 MMHG

## 2021-03-22 DIAGNOSIS — S49.91XD RIGHT SHOULDER INJURY, SUBSEQUENT ENCOUNTER: ICD-10-CM

## 2021-03-22 DIAGNOSIS — S43.004D DISLOCATION OF RIGHT SHOULDER JOINT, SUBSEQUENT ENCOUNTER: Primary | ICD-10-CM

## 2021-03-22 PROCEDURE — 1160F RVW MEDS BY RX/DR IN RCRD: CPT | Performed by: FAMILY MEDICINE

## 2021-03-22 PROCEDURE — 3008F BODY MASS INDEX DOCD: CPT | Performed by: FAMILY MEDICINE

## 2021-03-22 PROCEDURE — 99214 OFFICE O/P EST MOD 30 MIN: CPT | Performed by: FAMILY MEDICINE

## 2021-03-22 RX ORDER — CINACALCET 30 MG/1
30 TABLET, FILM COATED ORAL DAILY
COMMUNITY
Start: 2019-02-05 | End: 2021-05-19

## 2021-03-22 RX ORDER — DILTIAZEM HYDROCHLORIDE 240 MG/1
240 CAPSULE, COATED, EXTENDED RELEASE ORAL DAILY
COMMUNITY
Start: 2021-03-04

## 2021-03-22 RX ORDER — AMLODIPINE BESYLATE AND ATORVASTATIN CALCIUM 5; 10 MG/1; MG/1
TABLET, FILM COATED ORAL
COMMUNITY

## 2021-03-22 NOTE — PATIENT INSTRUCTIONS
F/u after MR arthrogram  MR arthrogram- R shoulder  R shoulder injury, dislocation, positive tests for labral tear, positive clunk test   R/o labral tear vs fracture  Continued pain despite home therapy for 4 wks, injury 6 wks ago

## 2021-03-22 NOTE — PROGRESS NOTES
[facility] [place] [address]  [facility] [address] [address] [phone]      Chief Complaint:  Chief Complaint   Patient presents with    Right Shoulder - Follow-up       Vitals:  BP (!) 206/97 (BP Location: Left arm, Patient Position: Sitting, Cuff Size: Large)   Pulse 72   Temp 98 1 °F (36 7 °C) (Temporal)   Ht 5' 3" (1 6 m)   Wt 88 5 kg (195 lb)   BMI 34 54 kg/m²     The following portions of the patient's history were reviewed and updated as appropriate: allergies, current medications, past family history, past medical history, past social history, past surgical history, and problem list       Subjective:   Patient ID: Shameka Egan is a 77 y o  female  Here for f/u  R shoulder pain/dislocation  She has been doing home exercises every other day for about 4 wks  No pain with exercises- ROM  But she is having pain with movement- lifting and pushing, reaching back  Achey pain  Hurts to type and write  Taking tylenol  Better at rest   Heat and ice helps      Review of Systems   Constitutional: Negative for fatigue and fever  Respiratory: Negative for shortness of breath  Cardiovascular: Negative for chest pain  Gastrointestinal: Negative for abdominal pain and nausea  Musculoskeletal: Positive for arthralgias  Skin: Negative for rash and wound  Neurological: Negative for weakness and headaches  Objective:  Right Shoulder Exam     Tenderness   The patient is experiencing no tenderness      Range of Motion   Active abduction: normal   Passive abduction: normal   Extension: normal   External rotation: normal   Forward flexion: normal   Internal rotation 0 degrees: normal   Internal rotation 90 degrees: normal     Muscle Strength   Abduction: 5/5   Internal rotation: 5/5   External rotation: 5/5   Supraspinatus: 5/5   Subscapularis: 5/5   Biceps: 5/5     Tests   Aquino test: positive    Comments:  Pos empty can test  obriens equivical  Pos push off test   Pos clunk test            Physical Exam  Constitutional:       Appearance: Normal appearance  She is normal weight  HENT:      Head: Normocephalic  Eyes:      Extraocular Movements: Extraocular movements intact  Neck:      Musculoskeletal: Normal range of motion  Pulmonary:      Effort: Pulmonary effort is normal    Musculoskeletal:         General: No tenderness  Skin:     General: Skin is warm and dry  Neurological:      General: No focal deficit present  Mental Status: She is alert and oriented to person, place, and time  Mental status is at baseline  Psychiatric:         Mood and Affect: Mood normal          Behavior: Behavior normal          Thought Content: Thought content normal          Judgment: Judgment normal                Assessment/Plan:  Assessment/Plan   Diagnoses and all orders for this visit:    Dislocation of right shoulder joint, subsequent encounter  -     FL arthrogram shoulder right; Future  -     MRI arthrogram right shoulder; Future    Right shoulder injury, subsequent encounter  -     FL arthrogram shoulder right; Future  -     MRI arthrogram right shoulder; Future    Other orders  -     amLODIPine-atorvastatin (CADUET) 5-10 MG per tablet; Take by mouth  -     cinacalcet (SENSIPAR) 30 mg tablet; Take 30 mg by mouth daily  -     diltiazem (CARDIZEM CD) 240 mg 24 hr capsule; Take 240 mg by mouth daily  -     metFORMIN (GLUCOPHAGE) 500 mg tablet; Take 500 mg by mouth daily        Return for Recheck       Annika Nolasco MD

## 2021-03-23 ENCOUNTER — IMMUNIZATIONS (OUTPATIENT)
Dept: FAMILY MEDICINE CLINIC | Facility: HOSPITAL | Age: 76
End: 2021-03-23

## 2021-03-23 DIAGNOSIS — Z23 ENCOUNTER FOR IMMUNIZATION: Primary | ICD-10-CM

## 2021-03-23 PROCEDURE — 0002A SARS-COV-2 / COVID-19 MRNA VACCINE (PFIZER-BIONTECH) 30 MCG: CPT

## 2021-03-23 PROCEDURE — 91300 SARS-COV-2 / COVID-19 MRNA VACCINE (PFIZER-BIONTECH) 30 MCG: CPT

## 2021-04-06 NOTE — NURSING NOTE
Call placed to patient to discuss upcoming right shoulder arthrogram at [facility]  Allergies reviewed  Verified with patient current anticoagulation medication of ASA 81 mg, but not required to stop per Periprocedural Management of Coagulation Status and Hemostasis Risk in Percutaneous Image Guided Procedures  Per patient was also taking Plavix 75 mg daily, but states her doctor just discontinued it  Pre procedure instructions including diet and taking own medications discussed with patient  Patient states her daughter will be driving her that day  Procedure and post procedure expectations and instructions reviewed with the patient  Patient verbalizes understanding  Per patient no further questions at this time  Patient reminded of the location, date and time of the expected procedure  Contact number provided in case patient has any further questions

## 2021-04-14 ENCOUNTER — HOSPITAL ENCOUNTER (OUTPATIENT)
Dept: RADIOLOGY | Facility: HOSPITAL | Age: 76
Discharge: HOME | End: 2021-04-14
Attending: FAMILY MEDICINE

## 2021-04-23 ENCOUNTER — TELEPHONE (OUTPATIENT)
Dept: OBGYN CLINIC | Facility: HOSPITAL | Age: 76
End: 2021-04-23

## 2021-04-23 DIAGNOSIS — S49.91XD RIGHT SHOULDER INJURY, SUBSEQUENT ENCOUNTER: Primary | ICD-10-CM

## 2021-04-23 RX ORDER — LIDOCAINE 50 MG/G
1 PATCH TOPICAL DAILY
Qty: 30 PATCH | Refills: 1 | Status: SHIPPED | OUTPATIENT
Start: 2021-04-23

## 2021-04-23 NOTE — TELEPHONE ENCOUNTER
I spoke to patient and advised that most likely insurance would not cover the lidoderm patches unless there is diabetic neuropathy , or herpatic neuralagia  Patient states she does have diabetic neuropathy per her endocrinologist  Please advise

## 2021-04-23 NOTE — TELEPHONE ENCOUNTER
Patient sees Dr Lev Sams  Patient called because she is in excruciating pain and she wants to know, if she can be prescribe with lidocaine patches? Her daughter gave her one and help her with the pain  She is currently taking Advil and Ibuprofen 800 mg, she has been icing the shoulder  Patient has an appointment on 5/05 for MRI arthrogram   [facility] [address] [facility] [place] [address]     # [phone]

## 2021-04-26 NOTE — NURSING NOTE
RN attempted to contact patient regarding upcoming right shoulder arthrogram at [facility]  Message left  Radiology RN's name and call back number provided in message

## 2021-04-30 NOTE — NURSING NOTE
Call placed to patient to discuss upcoming right shoulder arthrogram at [address] [facility]  Allergies reviewed  Verified with patient current anticoagulation medication of ASA 81 mg daily, but not required to stop per Periprocedural Management of Coagulation Status and Hemostasis Risk in Percutaneous Image Guided Procedures  Per patient no longer taking Plavix  Pre procedure instructions including diet and taking own medications discussed with patient  Per patient will have a   Procedure and post procedure expectations and instructions reviewed with the patient  Patient verbalizes understanding  Per patient no further questions at this time  Patient reminded of the location, date and time of the expected procedure  Contact number provided in case patient has any further questions

## 2021-05-05 ENCOUNTER — HOSPITAL ENCOUNTER (OUTPATIENT)
Dept: RADIOLOGY | Facility: HOSPITAL | Age: 76
Discharge: HOME | End: 2021-05-05
Attending: FAMILY MEDICINE

## 2021-05-11 ENCOUNTER — TELEPHONE (OUTPATIENT)
Dept: OBGYN CLINIC | Facility: HOSPITAL | Age: 76
End: 2021-05-11

## 2021-05-11 NOTE — TELEPHONE ENCOUNTER
Patient had the FL ARTHROGRAM SHOULDER RIGHT and the MRI done yesterday at [address]  She's having her records sent over  I offered the patient a f/u appt for review of imaging  Patient is requesting a virtual appt as she lives 80 miles away  Please advise      Callback YV#[phone]

## 2021-05-13 ENCOUNTER — TELEPHONE (OUTPATIENT)
Dept: OBGYN CLINIC | Facility: CLINIC | Age: 76
End: 2021-05-13

## 2021-07-28 RX ORDER — CINACALCET 30 MG/1
30 TABLET, FILM COATED ORAL DAILY
COMMUNITY

## 2021-07-28 RX ORDER — ASPIRIN 81 MG/1
81 TABLET ORAL DAILY
COMMUNITY

## 2021-07-28 RX ORDER — GABAPENTIN 100 MG/1
100 CAPSULE ORAL 3 TIMES DAILY
COMMUNITY
End: 2022-12-08 | Stop reason: ALTCHOICE

## 2021-07-28 RX ORDER — HYDROCHLOROTHIAZIDE 25 MG/1
25 TABLET ORAL DAILY
COMMUNITY
End: 2022-06-28 | Stop reason: ALTCHOICE

## 2021-07-28 RX ORDER — DILTIAZEM HYDROCHLORIDE 240 MG/1
240 CAPSULE, COATED, EXTENDED RELEASE ORAL DAILY
COMMUNITY
End: 2022-06-28 | Stop reason: ALTCHOICE

## 2021-07-28 RX ORDER — LOSARTAN POTASSIUM 50 MG/1
50 TABLET ORAL DAILY
COMMUNITY
End: 2021-11-02 | Stop reason: DRUGHIGH

## 2021-07-28 RX ORDER — ATORVASTATIN CALCIUM 20 MG/1
20 TABLET, FILM COATED ORAL DAILY
COMMUNITY
End: 2022-06-28 | Stop reason: ALTCHOICE

## 2021-08-02 ENCOUNTER — OFFICE VISIT (OUTPATIENT)
Dept: PRIMARY CARE | Facility: CLINIC | Age: 76
End: 2021-08-02
Payer: MEDICARE

## 2021-08-02 VITALS
RESPIRATION RATE: 18 BRPM | SYSTOLIC BLOOD PRESSURE: 128 MMHG | HEIGHT: 62 IN | BODY MASS INDEX: 37.17 KG/M2 | WEIGHT: 202 LBS | OXYGEN SATURATION: 97 % | HEART RATE: 78 BPM | DIASTOLIC BLOOD PRESSURE: 58 MMHG | TEMPERATURE: 98.2 F

## 2021-08-02 DIAGNOSIS — G04.90 MENINGOENCEPHALITIS: ICD-10-CM

## 2021-08-02 DIAGNOSIS — I10 ESSENTIAL HYPERTENSION: Primary | ICD-10-CM

## 2021-08-02 DIAGNOSIS — E21.0 PRIMARY HYPERPARATHYROIDISM (CMS/HCC): ICD-10-CM

## 2021-08-02 DIAGNOSIS — E11.9 TYPE 2 DIABETES MELLITUS WITHOUT COMPLICATION, WITHOUT LONG-TERM CURRENT USE OF INSULIN (CMS/HCC): ICD-10-CM

## 2021-08-02 DIAGNOSIS — I63.9 CEREBELLAR INFARCT (CMS/HCC): ICD-10-CM

## 2021-08-02 DIAGNOSIS — G56.00 CARPAL TUNNEL SYNDROME, UNSPECIFIED LATERALITY: ICD-10-CM

## 2021-08-02 DIAGNOSIS — R29.6 RECURRENT FALLS: ICD-10-CM

## 2021-08-02 DIAGNOSIS — E11.42 DIABETIC PERIPHERAL NEUROPATHY (CMS/HCC): ICD-10-CM

## 2021-08-02 PROCEDURE — G8754 DIAS BP LESS 90: HCPCS | Performed by: INTERNAL MEDICINE

## 2021-08-02 PROCEDURE — 99205 OFFICE O/P NEW HI 60 MIN: CPT | Performed by: INTERNAL MEDICINE

## 2021-08-02 PROCEDURE — G8752 SYS BP LESS 140: HCPCS | Performed by: INTERNAL MEDICINE

## 2021-08-02 RX ORDER — LIDOCAINE 50 MG/G
1 PATCH TOPICAL DAILY
COMMUNITY
Start: 2021-04-23 | End: 2021-11-02

## 2021-08-02 RX ORDER — CHOLECALCIFEROL (VITAMIN D3) 25 MCG
1000 TABLET ORAL DAILY
COMMUNITY

## 2021-08-02 RX ORDER — METFORMIN HYDROCHLORIDE 500 MG/1
500 TABLET ORAL DAILY
COMMUNITY
Start: 2020-12-02 | End: 2022-06-28 | Stop reason: ALTCHOICE

## 2021-08-02 ASSESSMENT — ENCOUNTER SYMPTOMS: DIZZINESS: 1

## 2021-08-02 ASSESSMENT — PATIENT HEALTH QUESTIONNAIRE - PHQ9: SUM OF ALL RESPONSES TO PHQ9 QUESTIONS 1 & 2: 0

## 2021-08-02 NOTE — ASSESSMENT & PLAN NOTE
Positive Romberg history of recurrent falls.  Has had decent control of her blood sugars recently will update labs.  Is on gabapentin for neuropathic pain

## 2021-08-02 NOTE — PROGRESS NOTES
Daily Progress Note      Subjective      Patient ID: Anita Gonzalez is a 76 y.o. female.  Chief Complaint   Patient presents with   • Establish Care     Moved from the [facility] 05/2021   • Fall     Recurrent falls   • Dizziness     The patient is here to get established. She has fallen this past year and has shoulder and knee pain. She saw Dr. Alejandre for dizziness and imbalance. She had LINQ cardiac monitor placed 07/06/2021    Fall    Dizziness    76-year-old patient presents as a new patient.  She has a history of recurrent falls.  Some of them with severe injury recently dislocating her right shoulder anterior right rotator cuff.  She has significant pain that is residual from that she was told that she will need a shoulder replacement she is hoping to avoid surgery.  She is gradually regaining some function of the right shoulder.  Interestingly she fell down the steps several years ago and had a partial tear of her left rotator cuff.  She is following with Dr. Alejandre neurology was determined to have sensory neuropathy.  3 years ago was admitted to [facility] with encephalopathy.  No cause was determined.  Extensive infectious work-up was done and she was treated empirically for encephalitis.  She has not had residual or recurrent symptoms.    She has a long history of diabetes and has been under good control lately.  There was a period time when her A1c was greater than 8 more recently has been less than 7    She has had neuropathic pain that has been greatly benefited by the addition of Neurontin to her regimen.    She has some primary hyperparathyroidism and follows with endocrinology.  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      Moved from [place]. Had [place] in January. Daughter lived in [place], and bought home in [place]  [facility] with her.      Saw Dr Alejandre- pain and imbalance in legs.  Fallen in past  Fell in February- dislocated shoulder and tore rotator cuff.  Was told she will need new shoulder- has not been eager to move forward with that. Living with the pain everyday.  Ortho at [facility].    Fell July 4 banged knee  History of many falls- fell down stairs years ago partial tear left rtator cuff    Diabetes 2018- last a1c 6.5  ashish rx gabapentin for pain in legs  Pain in distal shins for about 1 year.    The following have been reviewed and updated as appropriate in this visit:       Review of Systems   Neurological: Positive for dizziness.     Patient Active Problem List   Diagnosis   • Hypertension   • Primary hyperparathyroidism (CMS/HCC)   • Type 2 diabetes mellitus without complication, without long-term current use of insulin (CMS/HCC)   • Cerebellar infarct (CMS/HCC)   • CTS (carpal tunnel syndrome)   • Diabetic peripheral neuropathy (CMS/HCC)   • Recurrent falls   • Meningoencephalitis     Current Outpatient Medications   Medication Sig Dispense Refill   • aspirin 81 mg enteric coated tablet Take 81 mg by mouth daily.     • atorvastatin (LIPITOR) 20 mg tablet Take 20 mg by mouth daily.     • cholecalciferol, vitamin D3, 1,000 unit (25 mcg) tablet Take 1,000 Units by mouth daily.     • cinacalcet (SENSIPAR) 30 mg tablet Take 30 mg by mouth daily.     • dilTIAZem CD (CARDIZEM CD) 240 mg 24 hr capsule Take 240 mg by mouth daily.     • gabapentin (NEURONTIN) 100 mg capsule Take 100 mg by mouth 3 (three) times a day.     • hydrochlorothiazide (HYDRODIURIL) 25 mg tablet Take 25 mg by mouth daily.     • lidocaine (LIDODERM) 5 % patch Apply 1 patch topically daily.     • losartan (COZAAR) 50 mg tablet Take 50 mg by mouth daily.     • metFORMIN (GLUCOPHAGE) 500 mg tablet Take 500 mg by mouth daily.       No current facility-administered medications for this visit.     No past medical history on file.  No family history on  file.  Past Surgical History:   Procedure Laterality Date   • CARDIAC SURGERY  07/06/2021    Insertable Cardiac Monitor LINQ   • CARPAL TUNNEL RELEASE Right    • HYSTERECTOMY      Partial   • TONSILLECTOMY       Social History     Socioeconomic History   • Marital status:      Spouse name: Not on file   • Number of children: Not on file   • Years of education: Not on file   • Highest education level: Not on file   Occupational History   • Not on file   Tobacco Use   • Smoking status: Never Smoker   • Smokeless tobacco: Never Used   Substance and Sexual Activity   • Alcohol use: Yes     Comment: Rarely   • Drug use: Not Currently   • Sexual activity: Not Currently   Other Topics Concern   • Not on file   Social History Narrative    Retired Senior Clinical  rn, BSN,  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 [employer]    Lives with dtr, son in law , and grandson     Social Determinants of Health     Financial Resource Strain:    • Difficulty of Paying Living Expenses:    Food Insecurity:    • Worried About Running Out of Food in the Last Year:    • Ran Out of Food in the Last Year:    Transportation Needs:    • Lack of Transportation (Medical):    • Lack of Transportation (Non-Medical):    Physical Activity:    • Days of Exercise per Week:    • Minutes of Exercise per Session:    Stress:    • Feeling of Stress :    Social Connections:    • Frequency of Communication with Friends and Family:    • Frequency of Social Gatherings with Friends and Family:    • Attends Sabianist Services:    • Active Member of Clubs or Organizations:    • Attends Club or Organization Meetings:    • Marital Status:    Intimate Partner Violence:    • Fear of Current or Ex-Partner:    • Emotionally Abused:    • Physically Abused:    • Sexually Abused:      No Known Allergies  Vitals:    08/02/21 1513   BP: (!) 128/58   Pulse: 78   Resp: 18   Temp: 36.8 °C (98.2 °F)   SpO2: 97%     Objective     No results found for this  or any previous visit.  Vitals:    08/02/21 1513   BP: (!) 128/58   Pulse: 78   Resp: 18   Temp: 36.8 °C (98.2 °F)   SpO2: 97%     Vital signs reviewed  Physical Exam  Constitutional:       Appearance: She is well-developed.   HENT:      Head: Normocephalic and atraumatic.   Eyes:      Conjunctiva/sclera: Conjunctivae normal.   Cardiovascular:      Rate and Rhythm: Normal rate and regular rhythm.      Heart sounds: Normal heart sounds.   Pulmonary:      Effort: Pulmonary effort is normal. No respiratory distress.      Breath sounds: Normal breath sounds. No wheezing.   Abdominal:      General: Bowel sounds are normal.      Palpations: Abdomen is soft.   Musculoskeletal:         General: No deformity.      Cervical back: Normal range of motion and neck supple.      Right lower leg: No edema.      Left lower leg: No edema.   Skin:     General: Skin is warm and dry.   Neurological:      Mental Status: She is alert and oriented to person, place, and time.      Comments: Positive Romberg.  Able to get on examination table without any difficulty.  Heel-to-shin intact rapid alternating movements intact.  Extraocular movements intact   Psychiatric:         Behavior: Behavior normal.             Chemistry    No results found for: NA, K, CL, CO2, BUN, CREATININE, GLU No results found for: CALCIUM, ALKPHOS, AST, ALT, BILITOT       No results found for: HGBA1C  No results found for: TSH  No results found for: WBC, HGB, HCT, MCV, PLT    No results found for: LDLCALC    Assessment and Plan   Meningoencephalitis  2018- found on ground unconscious. 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  Has not had any recurrent encephalopathy but has had recurrent falls    Recurrent falls  Multiple falls over the past few years.  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  Currently following with neurology.  Does not use an assistive device to  ambulate.  Advised the patient she should always have a cane.  Discussed getting a chair for her shower she already has grab bars.  We discussed the paramount nature of safety.    Diabetic peripheral neuropathy (CMS/HCC)  Positive Romberg history of recurrent falls.  Has had decent control of her blood sugars recently will update labs.  Is on gabapentin for neuropathic pain    CTS (carpal tunnel syndrome)  Determined by EMG but not having symptoms    Cerebellar infarct (CMS/[facility])  Left cerebellar infarct may be contributing to her recurrent falls and imbalance.  Currently has an implantable cardiac monitor.  She is on atorvastatin 20 mg blood pressure well controlled    Type 2 diabetes mellitus without complication, without long-term current use of insulin (CMS/[facility])  Control has been good lately A1c is consistently been less than 7 we will update labs continue Metformin    Primary hyperparathyroidism (CMS/[facility])  Has follow-up with endocrinology remains on Sensipar daily.    Hypertension  Blood pressure well controlled on losartan, diltiazem      Orders Placed This Encounter   Procedures   • Comprehensive metabolic panel     Standing Status:   Future     Number of Occurrences:   1     Standing Expiration Date:   8/2/2022     Order Specific Question:   Release to patient     Answer:   Immediate   • Hemoglobin A1c     Standing Status:   Future     Number of Occurrences:   1     Standing Expiration Date:   8/2/2022     Order Specific Question:   Release to patient     Answer:   Immediate   • Lipid Prof w Refl     Standing Status:   Future     Number of Occurrences:   1     Standing Expiration Date:   8/2/2022     Order Specific Question:   Release to patient     Answer:   Immediate   • Microalbumin/Creatinine Ur Random     Standing Status:   Future     Number of Occurrences:   1     Standing Expiration Date:   8/2/2022     Order Specific Question:   Release to patient     Answer:   Immediate   • TSH 3rd Generation      Standing Status:   Future     Number of Occurrences:   1     Standing Expiration Date:   8/2/2022     Order Specific Question:   Release to patient     Answer:   Immediate   • CBC and Differential     Standing Status:   Future     Number of Occurrences:   1     Standing Expiration Date:   8/2/2022     Order Specific Question:   Release to patient     Answer:   Immediate     60 minutes spent reviewing this patient's complicated history outlining our plan of care and counseling regarding falls with this patient.  This note was created using speech recognition software. Any errors are unintentional. If you have any questions or need clarification please call.  Catarino Slaughter MD  8/2/2021

## 2021-08-02 NOTE — ASSESSMENT & PLAN NOTE
2018- found on ground unconscious. 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  Has not had any recurrent encephalopathy but has had recurrent falls

## 2021-08-02 NOTE — ASSESSMENT & PLAN NOTE
Control has been good lately A1c is consistently been less than 7 we will update labs continue Metformin

## 2021-08-02 NOTE — ASSESSMENT & PLAN NOTE
Multiple falls over the past few years.  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  Currently following with neurology.  Does not use an assistive device to ambulate.  Advised the patient she should always have a cane.  Discussed getting a chair for her shower she already has grab bars.  We discussed the paramount nature of safety.

## 2021-08-02 NOTE — ASSESSMENT & PLAN NOTE
Left cerebellar infarct may be contributing to her recurrent falls and imbalance.  Currently has an implantable cardiac monitor.  She is on atorvastatin 20 mg blood pressure well controlled

## 2021-10-27 LAB
ALBUMIN SERPL-MCNC: 4.4 G/DL (ref 3.6–5.1)
ALBUMIN/CREAT UR: 5 MCG/MG CREAT
ALBUMIN/GLOB SERPL: 1.7 (CALC) (ref 1–2.5)
ALP SERPL-CCNC: 52 U/L (ref 37–153)
ALT SERPL-CCNC: 18 U/L (ref 6–29)
AST SERPL-CCNC: 15 U/L (ref 10–35)
BASOPHILS # BLD AUTO: 49 CELLS/UL (ref 0–200)
BASOPHILS NFR BLD AUTO: 0.7 %
BILIRUB SERPL-MCNC: 0.7 MG/DL (ref 0.2–1.2)
BUN SERPL-MCNC: 20 MG/DL (ref 7–25)
BUN/CREAT SERPL: 19 (CALC) (ref 6–22)
CALCIUM SERPL-MCNC: 10.1 MG/DL (ref 8.6–10.4)
CHLORIDE SERPL-SCNC: 102 MMOL/L (ref 98–110)
CHOLEST SERPL-MCNC: 144 MG/DL
CHOLEST/HDLC SERPL: 4.4 (CALC)
CO2 SERPL-SCNC: 28 MMOL/L (ref 20–32)
CREAT SERPL-MCNC: 1.03 MG/DL (ref 0.6–0.93)
CREAT UR-MCNC: 220 MG/DL (ref 20–275)
EOSINOPHIL # BLD AUTO: 189 CELLS/UL (ref 15–500)
EOSINOPHIL NFR BLD AUTO: 2.7 %
ERYTHROCYTE [DISTWIDTH] IN BLOOD BY AUTOMATED COUNT: 12.9 % (ref 11–15)
GLOBULIN SER CALC-MCNC: 2.6 G/DL (CALC) (ref 1.9–3.7)
GLUCOSE SERPL-MCNC: 134 MG/DL (ref 65–99)
HBA1C MFR BLD: 6.8 % OF TOTAL HGB
HCT VFR BLD AUTO: 37.8 % (ref 35–45)
HDLC SERPL-MCNC: 33 MG/DL
HGB BLD-MCNC: 12.5 G/DL (ref 11.7–15.5)
LDLC SERPL CALC-MCNC: 68 MG/DL (CALC)
LYMPHOCYTES # BLD AUTO: 2079 CELLS/UL (ref 850–3900)
LYMPHOCYTES NFR BLD AUTO: 29.7 %
MCH RBC QN AUTO: 29.1 PG (ref 27–33)
MCHC RBC AUTO-ENTMCNC: 33.1 G/DL (ref 32–36)
MCV RBC AUTO: 87.9 FL (ref 80–100)
MICROALBUMIN UR-MCNC: 1.1 MG/DL
MONOCYTES # BLD AUTO: 490 CELLS/UL (ref 200–950)
MONOCYTES NFR BLD AUTO: 7 %
NEUTROPHILS # BLD AUTO: 4193 CELLS/UL (ref 1500–7800)
NEUTROPHILS NFR BLD AUTO: 59.9 %
NONHDLC SERPL-MCNC: 111 MG/DL (CALC)
PLATELET # BLD AUTO: 204 THOUSAND/UL (ref 140–400)
PMV BLD REES-ECKER: 11.4 FL (ref 7.5–12.5)
POTASSIUM SERPL-SCNC: 4.3 MMOL/L (ref 3.5–5.3)
PROT SERPL-MCNC: 7 G/DL (ref 6.1–8.1)
QUEST EGFR AFRICAN AMERICAN: 62 ML/MIN/1.73M2
QUEST EGFR NON-AFR. AMERICAN: 53 ML/MIN/1.73M2
RBC # BLD AUTO: 4.3 MILLION/UL (ref 3.8–5.1)
SODIUM SERPL-SCNC: 139 MMOL/L (ref 135–146)
TRIGL SERPL-MCNC: 353 MG/DL
TSH SERPL-ACNC: 1.56 MIU/L (ref 0.4–4.5)
WBC # BLD AUTO: 7 THOUSAND/UL (ref 3.8–10.8)

## 2021-10-28 ENCOUNTER — TELEPHONE (OUTPATIENT)
Dept: PRIMARY CARE | Facility: CLINIC | Age: 76
End: 2021-10-28

## 2021-10-28 NOTE — TELEPHONE ENCOUNTER
----- Message from Catarino Slaughter MD sent at 10/27/2021  8:23 PM EDT -----  Labs showed that blood count, thyroid function and metabolic profile normal  Cholesterol controlled  Triglycerides a little elevated  Diabetes well controlled a1c 6.8

## 2021-10-28 NOTE — RESULT ENCOUNTER NOTE
Labs showed that blood count, thyroid function and metabolic profile normal  Cholesterol controlled  Triglycerides a little elevated  Diabetes well controlled a1c 6.8

## 2021-11-02 ENCOUNTER — OFFICE VISIT (OUTPATIENT)
Dept: PRIMARY CARE | Facility: CLINIC | Age: 76
End: 2021-11-02
Payer: MEDICARE

## 2021-11-02 VITALS
HEIGHT: 63 IN | HEART RATE: 84 BPM | RESPIRATION RATE: 20 BRPM | TEMPERATURE: 98.6 F | DIASTOLIC BLOOD PRESSURE: 62 MMHG | BODY MASS INDEX: 35.61 KG/M2 | SYSTOLIC BLOOD PRESSURE: 128 MMHG | WEIGHT: 201 LBS | OXYGEN SATURATION: 97 %

## 2021-11-02 DIAGNOSIS — E11.9 TYPE 2 DIABETES MELLITUS WITHOUT COMPLICATION, WITHOUT LONG-TERM CURRENT USE OF INSULIN (CMS/HCC): ICD-10-CM

## 2021-11-02 DIAGNOSIS — E78.2 MIXED DIABETIC HYPERLIPIDEMIA ASSOCIATED WITH TYPE 2 DIABETES MELLITUS (CMS/HCC): ICD-10-CM

## 2021-11-02 DIAGNOSIS — E11.69 MIXED DIABETIC HYPERLIPIDEMIA ASSOCIATED WITH TYPE 2 DIABETES MELLITUS (CMS/HCC): ICD-10-CM

## 2021-11-02 DIAGNOSIS — G47.00 INSOMNIA, UNSPECIFIED TYPE: ICD-10-CM

## 2021-11-02 DIAGNOSIS — R07.89 OTHER CHEST PAIN: Primary | ICD-10-CM

## 2021-11-02 PROBLEM — R07.9 CHEST PAIN: Status: ACTIVE | Noted: 2021-11-02

## 2021-11-02 PROCEDURE — 99214 OFFICE O/P EST MOD 30 MIN: CPT | Performed by: INTERNAL MEDICINE

## 2021-11-02 PROCEDURE — 93000 ELECTROCARDIOGRAM COMPLETE: CPT | Performed by: INTERNAL MEDICINE

## 2021-11-02 PROCEDURE — G8754 DIAS BP LESS 90: HCPCS | Performed by: INTERNAL MEDICINE

## 2021-11-02 PROCEDURE — G8752 SYS BP LESS 140: HCPCS | Performed by: INTERNAL MEDICINE

## 2021-11-02 RX ORDER — LOSARTAN POTASSIUM 100 MG/1
100 TABLET ORAL DAILY
COMMUNITY
Start: 2021-09-07

## 2021-11-02 RX ORDER — ICOSAPENT ETHYL 1000 MG/1
2 CAPSULE ORAL 2 TIMES DAILY WITH MEALS
COMMUNITY
Start: 2021-09-07

## 2021-11-02 RX ORDER — TRAZODONE HYDROCHLORIDE 50 MG/1
50 TABLET ORAL NIGHTLY
COMMUNITY
End: 2022-06-28 | Stop reason: ALTCHOICE

## 2021-11-02 ASSESSMENT — ENCOUNTER SYMPTOMS: HYPERTENSION: 1

## 2021-11-02 NOTE — ASSESSMENT & PLAN NOTE
Difficulty falling asleep has taken occasional trazodone finds it to has not helped much.  She has not taken this regularly have asked her to do this.  She would not be a good candidate for Ambien.  We discussed sleep hygiene.

## 2021-11-02 NOTE — ASSESSMENT & PLAN NOTE
Few concerning episodes of chest pain describes as someone standing on her chest.  Has not happened in over a week.  Had seen her cardiologist a month prior and a stress test was planned for complains of shortness of breath.  She is not really having shortness of breath any longer.  EKG was done in the office today.  It was compared to EKGs from May and September done at the [facility] no significant change.  Agree with stress test next week.  She will call if she has recurrent or change in her chest pain.

## 2021-11-02 NOTE — ASSESSMENT & PLAN NOTE
Implantable loop recorder did not show any A. fib.  She had transthoracic echocardiogram but has not done a NICOLE.  Cardiologist would like her to do a NICOLE she is deathly afraid of going forward with this.  We discussed this.  I suggested that she get the stress test first and table to NICOLE for now given her intense fear about having this procedure done

## 2021-11-02 NOTE — PROGRESS NOTES
Daily Progress Note      Subjective      Patient ID: Anita Gonzalez is a 76 y.o. female.  Chief Complaint   Patient presents with   • Follow-up     Review labs   • Diabetes   • Hypertension   • Chest Pain     The patient is here for a 3 month follow up. She has had multiple episodes of chest heaviness. She is scheduled for a nuclear stress test 11/11/2021.    Diabetes  Associated symptoms include chest pain.   Hypertension  Associated symptoms include chest pain.   Chest Pain       Saw cardiologist and EP cardiologist since I saw her    Subsequent to that has had a few episodes of chest pain  Feels like some one standing on chest  Occurred at [facility]- needed to get wheelchair briefly  Occurred later in trip in [place]  Happened 6-7 times - lasts minutes      She is deathly afraid of having NICOLE.  She says it makes her feel anxious just thinking about the procedure.  The following have been reviewed and updated as appropriate in this visit:  Allergies  Meds  Problems       Review of Systems   Cardiovascular: Positive for chest pain.     Patient Active Problem List   Diagnosis   • Hypertension   • Primary hyperparathyroidism (CMS/HCC)   • Type 2 diabetes mellitus without complication, without long-term current use of insulin (CMS/HCC)   • Cerebellar infarct (CMS/HCC)   • CTS (carpal tunnel syndrome)   • Diabetic peripheral neuropathy (CMS/HCC)   • Recurrent falls   • Meningoencephalitis   • Chest pain   • Insomnia   • Mixed diabetic hyperlipidemia associated with type 2 diabetes mellitus (CMS/HCC)     Current Outpatient Medications   Medication Sig Dispense Refill   • aspirin 81 mg enteric coated tablet Take 81 mg by mouth daily.     • atorvastatin (LIPITOR) 20 mg tablet Take 20 mg by mouth daily.     • cholecalciferol, vitamin D3, 1,000 unit (25 mcg) tablet Take 1,000 Units by mouth daily.     • cinacalcet (SENSIPAR) 30 mg tablet Take 30 mg by mouth daily.     • dilTIAZem CD (CARDIZEM CD) 240 mg 24 hr capsule Take  240 mg by mouth daily.     • gabapentin (NEURONTIN) 100 mg capsule Take 100 mg by mouth 3 (three) times a day.     • hydrochlorothiazide (HYDRODIURIL) 25 mg tablet Take 25 mg by mouth daily.     • losartan 100 mg tablet Take 100 mg by mouth daily.     • metFORMIN (GLUCOPHAGE) 500 mg tablet Take 500 mg by mouth daily.     • traZODone 50 mg tablet Take 50 mg by mouth nightly.     • VASCEPA 1 gram capsule Take 2 capsules by mouth daily.       No current facility-administered medications for this visit.     No past medical history on file.  No family history on file.  Past Surgical History:   Procedure Laterality Date   • CARDIAC SURGERY  07/06/2021    Insertable Cardiac Monitor LINQ   • CARPAL TUNNEL RELEASE Right    • HYSTERECTOMY      Partial   • TONSILLECTOMY       Social History     Socioeconomic History   • Marital status:      Spouse name: Not on file   • Number of children: Not on file   • Years of education: Not on file   • Highest education level: Not on file   Occupational History   • Not on file   Tobacco Use   • Smoking status: Never Smoker   • Smokeless tobacco: Never Used   Substance and Sexual Activity   • Alcohol use: Yes     Comment: Rarely   • Drug use: Not Currently   • Sexual activity: Not Currently   Other Topics Concern   • Not on file   Social History Narrative    Retired Senior Clinical  rn, BSN,  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 [employer]    Lives with dtr, son in law , and grandson     Social Determinants of Health     Financial Resource Strain:    • Difficulty of Paying Living Expenses: Not on file   Food Insecurity:    • Worried About Running Out of Food in the Last Year: Not on file   • Ran Out of Food in the Last Year: Not on file   Transportation Needs:    • Lack of Transportation (Medical): Not on file   • Lack of Transportation (Non-Medical): Not on file   Physical Activity:    • Days of Exercise per Week: Not on file   • Minutes of Exercise per Session:  Not on file   Stress:    • Feeling of Stress : Not on file   Social Connections:    • Frequency of Communication with Friends and Family: Not on file   • Frequency of Social Gatherings with Friends and Family: Not on file   • Attends [employer] Services: Not on file   • Active Member of Clubs or Organizations: Not on file   • Attends Club or Organization Meetings: Not on file   • Marital Status: Not on file   Intimate Partner Violence:    • Fear of Current or Ex-Partner: Not on file   • Emotionally Abused: Not on file   • Physically Abused: Not on file   • Sexually Abused: Not on file   Housing Stability:    • Unable to Pay for Housing in the Last Year: Not on file   • Number of Places Lived in the Last Year: Not on file   • Unstable Housing in the Last Year: Not on file     No Known Allergies  Vitals:    11/02/21 1030   BP: 128/62   Pulse: 84   Resp: 20   Temp: 37 °C (98.6 °F)   SpO2: 97%     Objective     Results for orders placed or performed in visit on 08/02/21   Comprehensive metabolic panel   Result Value Ref Range    Glucose 134 (H) 65 - 99 mg/dL    BUN 20 7 - 25 mg/dL    Creatinine 1.03 (H) 0.60 - 0.93 mg/dL    Egfr Non-Afr. American 53 (L) > OR = 60 mL/min/1.73m2    Egfr  62 > OR = 60 mL/min/1.73m2    Bun/Creatinine Ratio 19 6 - 22 (calc)    Sodium 139 135 - 146 mmol/L    Potassium 4.3 3.5 - 5.3 mmol/L    Chloride 102 98 - 110 mmol/L    Carbon Dioxide 28 20 - 32 mmol/L    Calcium 10.1 8.6 - 10.4 mg/dL    Protein, Total 7.0 6.1 - 8.1 g/dL    Albumin 4.4 3.6 - 5.1 g/dL    Globulin 2.6 1.9 - 3.7 g/dL (calc)    Albumin/Globulin Ratio 1.7 1.0 - 2.5 (calc)    Bilirubin, Total 0.7 0.2 - 1.2 mg/dL    Alkaline Phosphatase 52 37 - 153 U/L    Ast 15 10 - 35 U/L    Alt 18 6 - 29 U/L   Hemoglobin A1c   Result Value Ref Range    Hemoglobin A1C 6.8 (H) <5.7 % of total Hgb   Lipid Prof w Refl   Result Value Ref Range    Cholesterol, Total 144 <200 mg/dL    Hdl Cholesterol 33 (L) > OR = 50 mg/dL     Triglycerides 353 (H) <150 mg/dL    Ldl-Cholesterol 68 mg/dL (calc)    Chol/Hdlc Ratio 4.4 <5.0 (calc)    Non Hdl Cholesterol 111 <130 mg/dL (calc)   Microalbumin/Creatinine Ur Random   Result Value Ref Range    Creatinine Rnd Ur 220 20 - 275 mg/dL    Microalbumin 1.1 See Note: mg/dL    Microalbumin/Creatinine Ratio, Random Urine 5 <30 mcg/mg creat   TSH 3rd Generation   Result Value Ref Range    TSH 1.56 0.40 - 4.50 mIU/L   CBC and Differential   Result Value Ref Range    White Blood Cell Count 7.0 3.8 - 10.8 Thousand/uL    Red Blood Cell Count 4.30 3.80 - 5.10 Million/uL    Hemoglobin 12.5 11.7 - 15.5 g/dL    HEMATOCRIT 37.8 35.0 - 45.0 %    Mcv 87.9 80.0 - 100.0 fL    Mch 29.1 27.0 - 33.0 pg    Mchc 33.1 32.0 - 36.0 g/dL    Rdw 12.9 11.0 - 15.0 %    Platelet Count 204 140 - 400 Thousand/uL    Mpv 11.4 7.5 - 12.5 fL    Absolute Neutrophils 4,193 1,500 - 7,800 cells/uL    Absolute Lymphocytes 2,079 850 - 3,900 cells/uL    Absolute Monocytes 490 200 - 950 cells/uL    Absolute Eosinophils 189 15 - 500 cells/uL    Absolute Basophils 49 0 - 200 cells/uL    Neutrophils 59.9 %    Lymphocytes 29.7 %    Monocytes 7.0 %    Eosinophils 2.7 %    Basophils 0.7 %     Vitals:    11/02/21 1030   BP: 128/62   Pulse: 84   Resp: 20   Temp: 37 °C (98.6 °F)   SpO2: 97%     Vital signs reviewed  Physical Exam  Constitutional:       Appearance: She is well-developed.   HENT:      Head: Normocephalic and atraumatic.   Eyes:      Conjunctiva/sclera: Conjunctivae normal.   Cardiovascular:      Rate and Rhythm: Normal rate and regular rhythm.      Heart sounds: Normal heart sounds.   Pulmonary:      Effort: Pulmonary effort is normal. No respiratory distress.      Breath sounds: Normal breath sounds. No wheezing.   Abdominal:      General: Bowel sounds are normal.      Palpations: Abdomen is soft.   Musculoskeletal:         General: No deformity.      Cervical back: Normal range of motion and neck supple.      Right lower leg: No edema.       Left lower leg: No edema.   Skin:     General: Skin is warm and dry.   Neurological:      Mental Status: She is alert and oriented to person, place, and time.   Psychiatric:         Behavior: Behavior normal.             Chemistry        Component Value Date/Time     10/26/2021 1143    K 4.3 10/26/2021 1143     10/26/2021 1143    CO2 28 10/26/2021 1143    BUN 20 10/26/2021 1143    CREATININE 1.03 (H) 10/26/2021 1143        Component Value Date/Time    CALCIUM 10.1 10/26/2021 1143    ALKPHOS 52 10/26/2021 1143    AST 15 10/26/2021 1143    ALT 18 10/26/2021 1143    BILITOT 0.7 10/26/2021 1143          Lab Results   Component Value Date    HGBA1C 6.8 (H) 10/26/2021     Lab Results   Component Value Date    TSH 1.56 10/26/2021     Lab Results   Component Value Date    WBC 7.0 10/26/2021    HGB 12.5 10/26/2021    HCT 37.8 10/26/2021    MCV 87.9 10/26/2021     10/26/2021       Lab Results   Component Value Date    LDLCALC 68 10/26/2021       Assessment and Plan   Chest pain  Few concerning episodes of chest pain describes as someone standing on her chest.  Has not happened in over a week.  Had seen her cardiologist a month prior and a stress test was planned for complains of shortness of breath.  She is not really having shortness of breath any longer.  EKG was done in the office today.  It was compared to EKGs from May and September done at the [facility] no significant change.  Agree with stress test next week.  She will call if she has recurrent or change in her chest pain.    Cerebellar infarct (CMS/HCC)  Implantable loop recorder did not show any A. fib.  She had transthoracic echocardiogram but has not done a NICOLE.  Cardiologist would like her to do a NICOLE she is deathly afraid of going forward with this.  We discussed this.  I suggested that she get the stress test first and table to NICOLE for now given her intense fear about having this procedure done    Type 2 diabetes mellitus without  complication, without long-term current use of insulin (CMS/[facility])  A1c 6.8.  She is motivated to try to get that even better continue Metformin    Mixed diabetic hyperlipidemia associated with type 2 diabetes mellitus (CMS/[facility])  Triglycerides 363.  Has recently been started on Vascepa by her cardiologist.  She is tolerating this well continue atorvastatin 20 mg as well    Insomnia  Difficulty falling asleep has taken occasional trazodone finds it to has not helped much.  She has not taken this regularly have asked her to do this.  She would not be a good candidate for Ambien.  We discussed sleep hygiene.      Orders Placed This Encounter   Procedures   • ECG 12 LEAD OFFICE PERFORMED     Scheduling Instructions:      PLEASE USE THIS ORDER FOR ECG'S PERFORMED IN PHYSICIAN OFFICES     Order Specific Question:   Release to patient     Answer:   Immediate         This note was created using speech recognition software. Any errors are unintentional. If you have any questions or need clarification please call.  Catarino Slaughter MD  11/2/2021

## 2021-11-02 NOTE — ASSESSMENT & PLAN NOTE
Triglycerides 363.  Has recently been started on Vascepa by her cardiologist.  She is tolerating this well continue atorvastatin 20 mg as well

## 2022-06-28 ENCOUNTER — OFFICE VISIT (OUTPATIENT)
Dept: PRIMARY CARE | Facility: CLINIC | Age: 77
End: 2022-06-28
Payer: MEDICARE

## 2022-06-28 VITALS
SYSTOLIC BLOOD PRESSURE: 114 MMHG | OXYGEN SATURATION: 98 % | HEART RATE: 60 BPM | TEMPERATURE: 98.3 F | BODY MASS INDEX: 32.25 KG/M2 | DIASTOLIC BLOOD PRESSURE: 52 MMHG | RESPIRATION RATE: 16 BRPM | HEIGHT: 63 IN | WEIGHT: 182 LBS

## 2022-06-28 DIAGNOSIS — E21.0 PRIMARY HYPERPARATHYROIDISM (CMS/HCC): ICD-10-CM

## 2022-06-28 DIAGNOSIS — E11.69 MIXED DIABETIC HYPERLIPIDEMIA ASSOCIATED WITH TYPE 2 DIABETES MELLITUS (CMS/HCC): ICD-10-CM

## 2022-06-28 DIAGNOSIS — I63.9 CEREBELLAR INFARCT (CMS/HCC): ICD-10-CM

## 2022-06-28 DIAGNOSIS — E78.2 MIXED DIABETIC HYPERLIPIDEMIA ASSOCIATED WITH TYPE 2 DIABETES MELLITUS (CMS/HCC): ICD-10-CM

## 2022-06-28 DIAGNOSIS — I25.10 CORONARY ARTERY DISEASE INVOLVING NATIVE CORONARY ARTERY OF NATIVE HEART, UNSPECIFIED WHETHER ANGINA PRESENT: ICD-10-CM

## 2022-06-28 DIAGNOSIS — E11.9 TYPE 2 DIABETES MELLITUS WITHOUT COMPLICATION, WITHOUT LONG-TERM CURRENT USE OF INSULIN (CMS/HCC): Primary | ICD-10-CM

## 2022-06-28 DIAGNOSIS — E78.6 LOW HDL (UNDER 40): ICD-10-CM

## 2022-06-28 PROCEDURE — G8754 DIAS BP LESS 90: HCPCS | Performed by: INTERNAL MEDICINE

## 2022-06-28 PROCEDURE — G8752 SYS BP LESS 140: HCPCS | Performed by: INTERNAL MEDICINE

## 2022-06-28 PROCEDURE — 99214 OFFICE O/P EST MOD 30 MIN: CPT | Performed by: INTERNAL MEDICINE

## 2022-06-28 RX ORDER — EZETIMIBE 10 MG/1
10 TABLET ORAL
COMMUNITY
Start: 2022-04-13

## 2022-06-28 RX ORDER — HYDROCHLOROTHIAZIDE 12.5 MG/1
12.5 TABLET ORAL
COMMUNITY
Start: 2022-04-03

## 2022-06-28 RX ORDER — CLOPIDOGREL BISULFATE 75 MG/1
75 TABLET ORAL
COMMUNITY
Start: 2022-05-07 | End: 2022-11-29 | Stop reason: ALTCHOICE

## 2022-06-28 RX ORDER — EMPAGLIFLOZIN 10 MG/1
10 TABLET, FILM COATED ORAL
COMMUNITY
Start: 2022-06-07

## 2022-06-28 RX ORDER — CARVEDILOL 3.12 MG/1
3.12 TABLET ORAL 2 TIMES DAILY WITH MEALS
COMMUNITY
Start: 2022-03-30 | End: 2022-11-29 | Stop reason: ALTCHOICE

## 2022-06-28 RX ORDER — ATORVASTATIN CALCIUM 40 MG/1
40 TABLET, FILM COATED ORAL
COMMUNITY
Start: 2022-05-07

## 2022-06-28 ASSESSMENT — ENCOUNTER SYMPTOMS
NUMBNESS: 1
HYPERTENSION: 1

## 2022-06-28 NOTE — ASSESSMENT & PLAN NOTE
Presented with unsteadiness.  She had an implantable loop recorder that did not show any underlying A. fib continue secondary prevention.  She is currently on dual antiplatelet therapy given her recent cardiac stent.  Lipitor was increased to 40 mg following her stent

## 2022-06-28 NOTE — ASSESSMENT & PLAN NOTE
Underwent mid LAD stent November 2021 continue follow-up with Dr. Herrera in [facility]

## 2022-06-28 NOTE — PROGRESS NOTES
Daily Progress Note      Subjective      Patient ID: Anita Gonzalez is a 77 y.o. female.  Chief Complaint   Patient presents with   • Diabetes   • Hypertension   • Skin Lesion     On her back   • Follow-up     EMG done for Neurologist and she was referred to [facility] for numbness in her left hand     The patient is here for a follow up. She had a stent placed in November and was advised to see the nutritionist.     Diabetes    Hypertension    I saw her in November.  She was having chest pain.  An EKG that was reassuring but she had a subsequent stress test that confirmed coronary artery disease in the LAD distribution.  She underwent cardiac catheterization and had a stent placed.  She has had several changes in medications since then.    Mid LAD stent placed November 22, 2021  No angina since  Graduated from cardiac rehab last week      COVID infection January 2021.  Second COVID infection June 21, 2022  started with bad sore throat lot of mucus.  She had 2 boosters already      Lost 19# since last visit.  Not eating as much    She is interested in seeing a nutritionist.    She continues to follow-up with her neurologist Dr. Alejandre.  She continues to follow-up with endocrinology Dr. Stallworth and her cardiologist Dr. pete    The following have been reviewed and updated as appropriate in this visit:        COVID infection January 2021.  COVID infection June 21, 2022  Review of Systems   Neurological: Positive for numbness.     Patient Active Problem List   Diagnosis   • Hypertension   • Primary hyperparathyroidism (CMS/HCC)   • Type 2 diabetes mellitus without complication, without long-term current use of insulin (CMS/HCC)   • Cerebellar infarct (CMS/HCC)   • CTS (carpal tunnel syndrome)   • Diabetic peripheral neuropathy (CMS/HCC)   • Recurrent falls   • Meningoencephalitis   • Chest pain   • Insomnia   • Mixed diabetic hyperlipidemia associated with type 2 diabetes mellitus (CMS/HCC)   • CAD (coronary artery  disease)   • Low HDL (under 40)     Current Outpatient Medications   Medication Sig Dispense Refill   • aspirin 81 mg enteric coated tablet Take 81 mg by mouth daily.     • atorvastatin (LIPITOR) 40 mg tablet Take 40 mg by mouth once daily.     • carvediloL (COREG) 3.125 mg tablet Take 3.125 mg by mouth 2 (two) times a day with meals.     • cholecalciferol, vitamin D3, 1,000 unit (25 mcg) tablet Take 1,000 Units by mouth daily.     • cinacalcet (SENSIPAR) 30 mg tablet Take 30 mg by mouth daily.     • clopidogreL (PLAVIX) 75 mg tablet Take 75 mg by mouth once daily.     • ezetimibe (ZETIA) 10 mg tablet Take 10 mg by mouth once daily.     • gabapentin (NEURONTIN) 100 mg capsule Take 100 mg by mouth 3 (three) times a day.     • hydrochlorothiazide (HYDRODIURIL) 12.5 mg tablet Take 12.5 mg by mouth once daily.     • JARDIANCE 10 mg tablet Take 10 mg by mouth once daily.     • losartan 100 mg tablet Take 100 mg by mouth daily.     • VASCEPA 1 gram capsule Take 2 capsules by mouth 2 (two) times a day with meals.       No current facility-administered medications for this visit.     No past medical history on file.  No family history on file.  Past Surgical History:   Procedure Laterality Date   • CARDIAC SURGERY  07/06/2021    Insertable Cardiac Monitor LINQ   • CARPAL TUNNEL RELEASE Right    • CORONARY STENT PLACEMENT  11/22/2021   • HYSTERECTOMY      Partial   • TONSILLECTOMY       Social History     Socioeconomic History   • Marital status:      Spouse name: Not on file   • Number of children: Not on file   • Years of education: Not on file   • Highest education level: Not on file   Occupational History   • Not on file   Tobacco Use   • Smoking status: Never Smoker   • Smokeless tobacco: Never Used   Substance and Sexual Activity   • Alcohol use: Yes     Comment: Rarely   • Drug use: Not Currently   • Sexual activity: Not Currently   Other Topics Concern   • Not on file   Social History Narrative    Retired  Senior Clinical  rn, BSN,  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 [employer]    Lives with dtr, son in law , and grandson     Social Determinants of Health     Financial Resource Strain: Not on file   Food Insecurity: Not on file   Transportation Needs: Not on file   Physical Activity: Not on file   Stress: Not on file   Social Connections: Not on file   Intimate Partner Violence: Not on file   Housing Stability: Not on file     No Known Allergies  Vitals:    06/28/22 1050   BP: (!) 114/52   Pulse: 60   Resp: 16   Temp: 36.8 °C (98.3 °F)   SpO2: 98%     Objective     Results for orders placed or performed in visit on 08/02/21   Comprehensive metabolic panel   Result Value Ref Range    Glucose 134 (H) 65 - 99 mg/dL    BUN 20 7 - 25 mg/dL    Creatinine 1.03 (H) 0.60 - 0.93 mg/dL    Egfr Non-Afr. American 53 (L) > OR = 60 mL/min/1.73m2    Egfr  62 > OR = 60 mL/min/1.73m2    Bun/Creatinine Ratio 19 6 - 22 (calc)    Sodium 139 135 - 146 mmol/L    Potassium 4.3 3.5 - 5.3 mmol/L    Chloride 102 98 - 110 mmol/L    Carbon Dioxide 28 20 - 32 mmol/L    Calcium 10.1 8.6 - 10.4 mg/dL    Protein, Total 7.0 6.1 - 8.1 g/dL    Albumin 4.4 3.6 - 5.1 g/dL    Globulin 2.6 1.9 - 3.7 g/dL (calc)    Albumin/Globulin Ratio 1.7 1.0 - 2.5 (calc)    Bilirubin, Total 0.7 0.2 - 1.2 mg/dL    Alkaline Phosphatase 52 37 - 153 U/L    Ast 15 10 - 35 U/L    Alt 18 6 - 29 U/L   Hemoglobin A1c   Result Value Ref Range    Hemoglobin A1C 6.8 (H) <5.7 % of total Hgb   Lipid Prof w Refl   Result Value Ref Range    Cholesterol, Total 144 <200 mg/dL    Hdl Cholesterol 33 (L) > OR = 50 mg/dL    Triglycerides 353 (H) <150 mg/dL    Ldl-Cholesterol 68 mg/dL (calc)    Chol/Hdlc Ratio 4.4 <5.0 (calc)    Non Hdl Cholesterol 111 <130 mg/dL (calc)   Microalbumin/Creatinine Ur Random   Result Value Ref Range    Creatinine Rnd Ur 220 20 - 275 mg/dL    Microalbumin 1.1 See Note: mg/dL    Microalbumin/Creatinine Ratio, Random Urine 5  <30 mcg/mg creat   TSH 3rd Generation   Result Value Ref Range    TSH 1.56 0.40 - 4.50 mIU/L   CBC and Differential   Result Value Ref Range    White Blood Cell Count 7.0 3.8 - 10.8 Thousand/uL    Red Blood Cell Count 4.30 3.80 - 5.10 Million/uL    Hemoglobin 12.5 11.7 - 15.5 g/dL    HEMATOCRIT 37.8 35.0 - 45.0 %    Mcv 87.9 80.0 - 100.0 fL    Mch 29.1 27.0 - 33.0 pg    Mchc 33.1 32.0 - 36.0 g/dL    Rdw 12.9 11.0 - 15.0 %    Platelet Count 204 140 - 400 Thousand/uL    Mpv 11.4 7.5 - 12.5 fL    Absolute Neutrophils 4,193 1,500 - 7,800 cells/uL    Absolute Lymphocytes 2,079 850 - 3,900 cells/uL    Absolute Monocytes 490 200 - 950 cells/uL    Absolute Eosinophils 189 15 - 500 cells/uL    Absolute Basophils 49 0 - 200 cells/uL    Neutrophils 59.9 %    Lymphocytes 29.7 %    Monocytes 7.0 %    Eosinophils 2.7 %    Basophils 0.7 %     Vitals:    06/28/22 1050   BP: (!) 114/52   Pulse: 60   Resp: 16   Temp: 36.8 °C (98.3 °F)   SpO2: 98%     Vital signs reviewed  Physical Exam  Constitutional:       Appearance: She is well-developed.   HENT:      Head: Normocephalic and atraumatic.   Eyes:      Conjunctiva/sclera: Conjunctivae normal.   Cardiovascular:      Rate and Rhythm: Normal rate and regular rhythm.      Heart sounds: Normal heart sounds.   Pulmonary:      Effort: Pulmonary effort is normal. No respiratory distress.      Breath sounds: Normal breath sounds. No wheezing.   Abdominal:      General: Bowel sounds are normal.      Palpations: Abdomen is soft.   Musculoskeletal:         General: No deformity.      Cervical back: Normal range of motion and neck supple.      Right lower leg: No edema.      Left lower leg: No edema.   Skin:     General: Skin is warm and dry.   Neurological:      Mental Status: She is alert and oriented to person, place, and time.   Psychiatric:         Behavior: Behavior normal.             Chemistry        Component Value Date/Time     10/26/2021 1143    K 4.3 10/26/2021 1143      10/26/2021 1143    CO2 28 10/26/2021 1143    BUN 20 10/26/2021 1143    CREATININE 1.03 (H) 10/26/2021 1143        Component Value Date/Time    CALCIUM 10.1 10/26/2021 1143    ALKPHOS 52 10/26/2021 1143    AST 15 10/26/2021 1143    ALT 18 10/26/2021 1143    BILITOT 0.7 10/26/2021 1143          Lab Results   Component Value Date    HGBA1C 6.8 (H) 10/26/2021     Lab Results   Component Value Date    TSH 1.56 10/26/2021     Lab Results   Component Value Date    WBC 7.0 10/26/2021    HGB 12.5 10/26/2021    HCT 37.8 10/26/2021    MCV 87.9 10/26/2021     10/26/2021       Lab Results   Component Value Date    LDLCALC 68 10/26/2021       Assessment and Plan   Primary hyperparathyroidism (CMS/[facility])  Continues on Sensipar continues to see Dr. Stallworth at [place]    Type 2 diabetes mellitus without complication, without long-term current use of insulin (CMS/[facility])  Excellent control of her diabetes.  Metformin was changed to Jardiance.  She continues to follow with Dr. Stallworth    Cerebellar infarct (CMS/[facility])  Presented with unsteadiness.  She had an implantable loop recorder that did not show any underlying A. fib continue secondary prevention.  She is currently on dual antiplatelet therapy given her recent cardiac stent.  Lipitor was increased to 40 mg following her stent    Mixed diabetic hyperlipidemia associated with type 2 diabetes mellitus (CMS/[facility])  Triglycerides have improved greatly on Vascepa that she remembers on Zetia and Lipitor    CAD (coronary artery disease)  Underwent mid LAD stent November 2021 continue follow-up with Dr. Herrera in [facility]    Low HDL (under 40)  HDL 31 we talked about increasing aerobic activity.      Orders Placed This Encounter   Procedures   • Ambulatory referral to Nutrition Services       [facility]    To schedule an appointment please call:    Locations                                          Phone Number                    Fax if needed  [place]/[address]              [phone]  [place]                                            [phone]  [place]                                                  [phone]  [place]/[address]                             [phone]    Patients -- Please bring this referral to your appointment.     Hemoglobin A1C       Date                     Value               Ref Range           Status                10/26/2021               6.8 (H)             <5.7 % of tota*     Final              Comment:    For someone without known diabetes, a hemoglobin A1c    value of 6.5% or greater indicates that they may have     diabetes and this should be confirmed with a follow-up     test.         For someone with known diabetes, a value <7% indicates     that their diabetes is well controlled and a value     greater than or equal to 7% indicates suboptimal     control. A1c targets should be individualized based on     duration of diabetes, age, comorbid conditions, and     other considerations.         Currently, no consensus exists regarding use of    hemoglobin A1c for diagnosis of diabetes for children.            ----------    Body mass index is 32.24 kg/m².         Standing Status:   Future     Standing Expiration Date:   12/28/2022     Referral Priority:   Routine     Referral Type:   Health Education     Referral Reason:   Specialty Services Required     Requested Specialty:   Nutrition and Diabetes     Number of Visits Requested:   20         This note was created using speech recognition software. Any errors are unintentional. If you have any questions or need clarification please call.  Catarino Slaughter MD  6/28/2022

## 2022-10-17 LAB
EXTERNAL CREATININE URINE HM: 117
EXTERNAL MICROALBUMIN URINE RANDOM HM: 0.4
EXTERNAL MICROALBUMIN/CREATININE URINE: 3
GFR SERPL CREATININE-BSD FRML MDRD: 54 ML/MIN/1.73M*2
HBA1C MFR BLD: 5.8 %

## 2022-11-28 PROBLEM — N18.31 TYPE 2 DIABETES MELLITUS WITH STAGE 3A CHRONIC KIDNEY DISEASE, WITHOUT LONG-TERM CURRENT USE OF INSULIN (CMS/HCC): Status: ACTIVE | Noted: 2018-11-05

## 2022-11-28 PROBLEM — I44.7 LBBB (LEFT BUNDLE BRANCH BLOCK): Status: ACTIVE | Noted: 2021-04-22

## 2022-11-28 PROBLEM — Z01.818 PREOP GENERAL PHYSICAL EXAM: Status: ACTIVE | Noted: 2022-11-28

## 2022-11-28 PROBLEM — I44.7 BLOCK, BUNDLE BRANCH, LEFT: Status: ACTIVE | Noted: 2022-11-28

## 2022-11-28 PROBLEM — M85.80 LOW BONE MASS: Status: ACTIVE | Noted: 2018-11-20

## 2022-11-28 PROBLEM — E11.22 TYPE 2 DIABETES MELLITUS WITH STAGE 3A CHRONIC KIDNEY DISEASE, WITHOUT LONG-TERM CURRENT USE OF INSULIN (CMS/HCC): Status: ACTIVE | Noted: 2018-11-05

## 2022-11-28 NOTE — PROGRESS NOTES
Daily Progress Note      Subjective      Patient ID: Anita Gonzalez is a 77 y.o. female 1945  Chief Complaint   Patient presents with    Pre-op Exam     Patient here for Preoperative Evaluation for Left Carpal Tunnel   Date of Surgery:  12/9/2022  Surgeon:  Dr. Vernon   having numbness in left fingers since June   Had cortisone and now pain worse and numbness at night    Noted to have  Palpations in last several weeks   Pt has loop recorder and has had not had any arrhythmia on this yesterday and okay for surgery   Pt thought was due to Vascepa     Pt feels due to anxiety due to hand  Difficulty sleeping    No syncope  No dizziness   No chest pain no short of breath and  No edema  No activity intolerance    Feels anxious  At night       A1c  5.8  At endocrine        PMH  CAD with stent LAD in Nov 2021 on dual anticoagulant Rx ,  Cerebellar infarct loop recorder negative for Atrial fib primary  Hyper parathyroidism  left  CTS neuropathy HDL      On Jardiance only  Last A1c 5.5 in April 2022     Cardiology Dr Herrera faxed EKG  From         Denies loose teeth dentures , no bleeding problems , no problems with anesthesia   No activity intolerance or chest pain short of breath palpations or edema  No difficulty lying supine no Reflux   Patient with normal functional capacity MET level 4 -10  In normal state of health Pt has normal cognitive function and understands risk of procedure.  Pt has social support for post op period.                 The following have been reviewed and updated as appropriate in this visit:   Tobacco  Allergies  Meds  Med Hx  Surg Hx  Fam Hx  Soc Hx      Review of Systems   Constitutional: Negative for diaphoresis, fatigue, fever and unexpected weight change.   HENT: Positive for dental problem (dentures  ). Negative for congestion and trouble swallowing.    Respiratory: Negative for cough, shortness of breath and wheezing.    Cardiovascular: Positive for palpitations. Negative for  chest pain and leg swelling.   Gastrointestinal: Negative.    Genitourinary: Negative.    Musculoskeletal: Positive for arthralgias. Negative for joint swelling.   Neurological: Positive for numbness. Negative for dizziness, tremors, syncope, weakness, light-headedness and headaches.   Hematological: Negative.    Psychiatric/Behavioral: The patient is nervous/anxious.      Patient Active Problem List   Diagnosis    Hypertension    Primary hyperparathyroidism (CMS/HCC)    Type 2 diabetes mellitus with stage 3a chronic kidney disease, without long-term current use of insulin (CMS/[facility])    Cerebellar infarct (CMS/HCC)    CTS (carpal tunnel syndrome)    Diabetic peripheral neuropathy (CMS/HCC)    Recurrent falls    Meningoencephalitis    Chest pain    Insomnia    Mixed diabetic hyperlipidemia associated with type 2 diabetes mellitus (CMS/HCC)    CAD (coronary artery disease)    Low HDL (under 40)    LBBB (left bundle branch block)    Low bone mass    Preop general physical exam     Current Outpatient Medications   Medication Sig Dispense Refill    aspirin 81 mg enteric coated tablet Take 81 mg by mouth daily.      atorvastatin (LIPITOR) 40 mg tablet Take 40 mg by mouth once daily.      cholecalciferol, vitamin D3, 1,000 unit (25 mcg) tablet Take 1,000 Units by mouth daily.      cinacalcet (SENSIPAR) 30 mg tablet Take 30 mg by mouth daily.      ezetimibe (ZETIA) 10 mg tablet Take 10 mg by mouth once daily.      hydrochlorothiazide (HYDRODIURIL) 12.5 mg tablet Take 12.5 mg by mouth once daily.      JARDIANCE 10 mg tablet Take 10 mg by mouth once daily.      losartan 100 mg tablet Take 100 mg by mouth daily.      gabapentin (NEURONTIN) 100 mg capsule Take 100 mg by mouth 3 (three) times a day.      VASCEPA 1 gram capsule Take 2 capsules by mouth 2 (two) times a day with meals.       No current facility-administered medications for this visit.     History reviewed. No pertinent past medical  history.  History reviewed. No pertinent family history.  Past Surgical History:   Procedure Laterality Date    CARDIAC SURGERY  07/06/2021    Insertable Cardiac Monitor LINQ    CARPAL TUNNEL RELEASE Right     CORONARY STENT PLACEMENT  11/22/2021    HYSTERECTOMY      Partial    TONSILLECTOMY       Social History     Socioeconomic History    Marital status:      Spouse name: Not on file    Number of children: Not on file    Years of education: Not on file    Highest education level: Not on file   Occupational History    Not on file   Tobacco Use    Smoking status: Never    Smokeless tobacco: Never   Substance and Sexual Activity    Alcohol use: Yes     Comment: Rarely    Drug use: Not Currently    Sexual activity: Not Currently   Other Topics Concern    Not on file   Social History Narrative    Retired Senior Clinical  rn, BSN,  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 [employer]    Lives with dtr, son in law , and grandson     Social Determinants of Health     Financial Resource Strain: Not on file   Food Insecurity: Not on file   Transportation Needs: Not on file   Physical Activity: Not on file   Stress: Not on file   Social Connections: Not on file   Intimate Partner Violence: Not on file   Housing Stability: Not on file     No Known Allergies    Current Outpatient Medications:     aspirin 81 mg enteric coated tablet, Take 81 mg by mouth daily., Disp: , Rfl:     atorvastatin (LIPITOR) 40 mg tablet, Take 40 mg by mouth once daily., Disp: , Rfl:     cholecalciferol, vitamin D3, 1,000 unit (25 mcg) tablet, Take 1,000 Units by mouth daily., Disp: , Rfl:     cinacalcet (SENSIPAR) 30 mg tablet, Take 30 mg by mouth daily., Disp: , Rfl:     ezetimibe (ZETIA) 10 mg tablet, Take 10 mg by mouth once daily., Disp: , Rfl:     hydrochlorothiazide (HYDRODIURIL) 12.5 mg tablet, Take 12.5 mg by mouth once daily., Disp: , Rfl:     JARDIANCE 10 mg tablet, Take 10 mg by mouth once  daily., Disp: , Rfl:     losartan 100 mg tablet, Take 100 mg by mouth daily., Disp: , Rfl:     gabapentin (NEURONTIN) 100 mg capsule, Take 100 mg by mouth 3 (three) times a day., Disp: , Rfl:     VASCEPA 1 gram capsule, Take 2 capsules by mouth 2 (two) times a day with meals., Disp: , Rfl:   Health Maintenance   Topic Date Due    DEXA Scan  Never done    Annual Dilated Retinal Exam  Never done    Diabetic Foot Exam  Never done    Depression Screening  Never done    Medicare Annual Wellness Visit  Never done    Hepatitis C Screening  Never done    Zoster Vaccine (1 of 2) Never done    DTaP, Tdap, and Td Vaccines (1 - Tdap) 07/14/2016    Annual Falls Risk Screening  Never done    COVID-19 Vaccine (5 - Booster for Pfizer series) 06/03/2022    Diabetes Kidney Health Evaluation: eGFR  10/26/2022    Diabetes Kidney Health Evaluation:  uACR  10/26/2022    Hemoglobin A1C  10/26/2022    Influenza Vaccine  Completed    Pneumococcal (65 years and older)  Completed    Meningococcal ACWY  Aged Out    HIB Vaccines  Aged Out    IPV Vaccines  Aged Out    HPV Vaccines  Aged Out     Vitals:    11/29/22 0844   BP: 120/68   Pulse: 76   Resp: 16   Temp: 36.2 °C (97.2 °F)   SpO2: 98%     Objective     Results for orders placed or performed in visit on 08/02/21   Comprehensive metabolic panel   Result Value Ref Range    Glucose 134 (H) 65 - 99 mg/dL    BUN 20 7 - 25 mg/dL    Creatinine 1.03 (H) 0.60 - 0.93 mg/dL    Egfr Non-Afr. American 53 (L) > OR = 60 mL/min/1.73m2    Egfr  62 > OR = 60 mL/min/1.73m2    Bun/Creatinine Ratio 19 6 - 22 (calc)    Sodium 139 135 - 146 mmol/L    Potassium 4.3 3.5 - 5.3 mmol/L    Chloride 102 98 - 110 mmol/L    Carbon Dioxide 28 20 - 32 mmol/L    Calcium 10.1 8.6 - 10.4 mg/dL    Protein, Total 7.0 6.1 - 8.1 g/dL    Albumin 4.4 3.6 - 5.1 g/dL    Globulin 2.6 1.9 - 3.7 g/dL (calc)    Albumin/Globulin Ratio 1.7 1.0 - 2.5 (calc)    Bilirubin, Total 0.7 0.2 - 1.2 mg/dL     Alkaline Phosphatase 52 37 - 153 U/L    Ast 15 10 - 35 U/L    Alt 18 6 - 29 U/L   Hemoglobin A1c   Result Value Ref Range    Hemoglobin A1C 6.8 (H) <5.7 % of total Hgb   Lipid Prof w Refl   Result Value Ref Range    Cholesterol, Total 144 <200 mg/dL    Hdl Cholesterol 33 (L) > OR = 50 mg/dL    Triglycerides 353 (H) <150 mg/dL    Ldl-Cholesterol 68 mg/dL (calc)    Chol/Hdlc Ratio 4.4 <5.0 (calc)    Non Hdl Cholesterol 111 <130 mg/dL (calc)   Microalbumin/Creatinine Ur Random   Result Value Ref Range    Creatinine Rnd Ur 220 20 - 275 mg/dL    Microalbumin 1.1 See Note: mg/dL    Microalbumin/Creatinine Ratio, Random Urine 5 <30 mcg/mg creat   TSH 3rd Generation   Result Value Ref Range    TSH 1.56 0.40 - 4.50 mIU/L   CBC and Differential   Result Value Ref Range    White Blood Cell Count 7.0 3.8 - 10.8 Thousand/uL    Red Blood Cell Count 4.30 3.80 - 5.10 Million/uL    Hemoglobin 12.5 11.7 - 15.5 g/dL    HEMATOCRIT 37.8 35.0 - 45.0 %    Mcv 87.9 80.0 - 100.0 fL    Mch 29.1 27.0 - 33.0 pg    Mchc 33.1 32.0 - 36.0 g/dL    Rdw 12.9 11.0 - 15.0 %    Platelet Count 204 140 - 400 Thousand/uL    Mpv 11.4 7.5 - 12.5 fL    Absolute Neutrophils 4,193 1,500 - 7,800 cells/uL    Absolute Lymphocytes 2,079 850 - 3,900 cells/uL    Absolute Monocytes 490 200 - 950 cells/uL    Absolute Eosinophils 189 15 - 500 cells/uL    Absolute Basophils 49 0 - 200 cells/uL    Neutrophils 59.9 %    Lymphocytes 29.7 %    Monocytes 7.0 %    Eosinophils 2.7 %    Basophils 0.7 %     Vitals:    11/29/22 0844   BP: 120/68   Pulse: 76   Resp: 16   Temp: 36.2 °C (97.2 °F)   SpO2: 98%     Body mass index is 31.92 kg/m².  BP Readings from Last 3 Encounters:   11/29/22 120/68   06/28/22 (!) 114/52   11/02/21 128/62     Wt Readings from Last 3 Encounters:   11/29/22 82.4 kg (181 lb 9.6 oz)   06/28/22 82.6 kg (182 lb)   11/02/21 91.2 kg (201 lb)     Vital signs reviewed  Physical Exam  Vitals reviewed.   Constitutional:       Appearance: She is obese.   HENT:       Mouth/Throat:      Mouth: Mucous membranes are moist.      Pharynx: Oropharynx is clear.      Comments: Dentures  Upper lower   Eyes:      Extraocular Movements: Extraocular movements intact.      Conjunctiva/sclera: Conjunctivae normal.      Pupils: Pupils are equal, round, and reactive to light.   Neck:      Vascular: No carotid bruit.   Cardiovascular:      Rate and Rhythm: Normal rate and regular rhythm.      Pulses: Normal pulses.           Radial pulses are 2+ on the right side and 2+ on the left side.        Posterior tibial pulses are 2+ on the right side and 2+ on the left side.      Heart sounds: Normal heart sounds. No murmur heard.  Pulmonary:      Effort: Pulmonary effort is normal.      Breath sounds: Normal breath sounds.   Abdominal:      General: Bowel sounds are normal.      Palpations: Abdomen is soft.   Musculoskeletal:         General: Normal range of motion.      Cervical back: Normal range of motion and neck supple.      Right lower leg: No edema.      Left lower leg: No edema.      Comments: Left hand no atrophy   Positive Phalens   Distal CMS intact     Lymphadenopathy:      Cervical: No cervical adenopathy.   Skin:     General: Skin is warm.      Capillary Refill: Capillary refill takes less than 2 seconds.   Neurological:      Mental Status: She is alert and oriented to person, place, and time.   Psychiatric:         Mood and Affect: Mood normal.         Behavior: Behavior normal.             Chemistry        Component Value Date/Time     10/26/2021 1143    K 4.3 10/26/2021 1143     10/26/2021 1143    CO2 28 10/26/2021 1143    BUN 20 10/26/2021 1143    CREATININE 1.03 (H) 10/26/2021 1143        Component Value Date/Time    CALCIUM 10.1 10/26/2021 1143    ALKPHOS 52 10/26/2021 1143    AST 15 10/26/2021 1143    ALT 18 10/26/2021 1143    BILITOT 0.7 10/26/2021 1143          Lab Results   Component Value Date    HGBA1C 6.8 (H) 10/26/2021     Lab Results   Component Value Date     TSH 1.56 10/26/2021     Lab Results   Component Value Date    WBC 7.0 10/26/2021    HGB 12.5 10/26/2021    HCT 37.8 10/26/2021    MCV 87.9 10/26/2021     10/26/2021       Lab Results   Component Value Date    LDLCALC 68 10/26/2021       Assessment and Plan   Preop general physical exam  Acceptable risk for planned procedure  Of  L  CT release  Dr Venron 12/9/2022   Pending labs per Dr Vernon      pt was having some ?  Skipped beats  Per cardiology  Loop recorder was without pathology and today EKG SR  LBBB  No arrhythmia no chest pain    Has dentures to remove prior to surgery   Hold Jardiance   3 d prior   And hold ASA  3 d prior and hold Vascepa until after surgery    EKG L BBB  HR  63 no acute ischemia    Has loop recorder and no events reported per cardiology yesterday   Labs per Dr Vernon  BP well controlled no chest pain and  A1c  5.8  Good control    Dr Slaughter reviewed EKG and chart and signed forms faxed to surgeon      CAD (coronary artery disease)  Stable no chest pain   On ASA   Off plavix  Now    Off coreg now        Cerebellar infarct (CMS/HCC)  On ASA   Statin  Stable no further      CTS (carpal tunnel syndrome)  Left wrist  To have surgery failed  Injections   Dr Vernon     Diabetic peripheral neuropathy (CMS/HCC)   Gabapentin on hold per pt not effective     Get foot checks  At Podiatry  And good foot care        Hypertension   BP well controlled on losartan HCTZ       Insomnia  Difficulty falling asleep has taken occasional trazodone finds it to has not helped much anxious about surgery  Trazodone did not help    Sleep hygiene and will FU Dr Slaughter post surgery to discuss      Primary hyperparathyroidism (CMS/HCC)  Stable      Type 2 diabetes mellitus with stage 3a chronic kidney disease, without long-term current use of insulin (CMS/HCC)  Renal function baseline  Cr  1.06   To   1.07   and  Glucose well controlled  A1c 5.8   Oct  2022    Hold jardiance   3 d prior to surgery   Resume  when eating     Keep well hydrated            KALYAN Frank  11/29/2022

## 2022-11-29 ENCOUNTER — CONSULT (OUTPATIENT)
Dept: PRIMARY CARE | Facility: CLINIC | Age: 77
End: 2022-11-29
Payer: MEDICARE

## 2022-11-29 VITALS
TEMPERATURE: 97.2 F | WEIGHT: 181.6 LBS | OXYGEN SATURATION: 98 % | HEIGHT: 63 IN | RESPIRATION RATE: 16 BRPM | HEART RATE: 76 BPM | BODY MASS INDEX: 32.18 KG/M2 | DIASTOLIC BLOOD PRESSURE: 68 MMHG | SYSTOLIC BLOOD PRESSURE: 120 MMHG

## 2022-11-29 DIAGNOSIS — E11.22 TYPE 2 DIABETES MELLITUS WITH STAGE 3A CHRONIC KIDNEY DISEASE, WITHOUT LONG-TERM CURRENT USE OF INSULIN (CMS/HCC): ICD-10-CM

## 2022-11-29 DIAGNOSIS — G56.00 CARPAL TUNNEL SYNDROME, UNSPECIFIED LATERALITY: ICD-10-CM

## 2022-11-29 DIAGNOSIS — I10 PRIMARY HYPERTENSION: ICD-10-CM

## 2022-11-29 DIAGNOSIS — I63.9 CEREBELLAR INFARCT (CMS/HCC): ICD-10-CM

## 2022-11-29 DIAGNOSIS — Z01.818 PREOP GENERAL PHYSICAL EXAM: Primary | ICD-10-CM

## 2022-11-29 DIAGNOSIS — E11.42 DIABETIC PERIPHERAL NEUROPATHY (CMS/HCC): ICD-10-CM

## 2022-11-29 DIAGNOSIS — E21.0 PRIMARY HYPERPARATHYROIDISM (CMS/HCC): ICD-10-CM

## 2022-11-29 DIAGNOSIS — N18.31 TYPE 2 DIABETES MELLITUS WITH STAGE 3A CHRONIC KIDNEY DISEASE, WITHOUT LONG-TERM CURRENT USE OF INSULIN (CMS/HCC): ICD-10-CM

## 2022-11-29 DIAGNOSIS — I25.10 CORONARY ARTERY DISEASE INVOLVING NATIVE CORONARY ARTERY OF NATIVE HEART, UNSPECIFIED WHETHER ANGINA PRESENT: ICD-10-CM

## 2022-11-29 DIAGNOSIS — G47.00 INSOMNIA, UNSPECIFIED TYPE: ICD-10-CM

## 2022-11-29 PROCEDURE — G8752 SYS BP LESS 140: HCPCS | Performed by: NURSE PRACTITIONER

## 2022-11-29 PROCEDURE — 93000 ELECTROCARDIOGRAM COMPLETE: CPT | Performed by: NURSE PRACTITIONER

## 2022-11-29 PROCEDURE — G8754 DIAS BP LESS 90: HCPCS | Performed by: NURSE PRACTITIONER

## 2022-11-29 PROCEDURE — 99214 OFFICE O/P EST MOD 30 MIN: CPT | Mod: 25 | Performed by: NURSE PRACTITIONER

## 2022-11-29 ASSESSMENT — ENCOUNTER SYMPTOMS
FEVER: 0
DIAPHORESIS: 0
HEMATOLOGIC/LYMPHATIC NEGATIVE: 1
HEADACHES: 0
PALPITATIONS: 1
DIZZINESS: 0
LIGHT-HEADEDNESS: 0
UNEXPECTED WEIGHT CHANGE: 0
WHEEZING: 0
SHORTNESS OF BREATH: 0
TROUBLE SWALLOWING: 0
ARTHRALGIAS: 1
FATIGUE: 0
WEAKNESS: 0
NUMBNESS: 1
TREMORS: 0
COUGH: 0
NERVOUS/ANXIOUS: 1
JOINT SWELLING: 0
GASTROINTESTINAL NEGATIVE: 1

## 2022-11-29 NOTE — ASSESSMENT & PLAN NOTE
Difficulty falling asleep has taken occasional trazodone finds it to has not helped much anxious about surgery  Trazodone did not help    Sleep hygiene and will FU Dr Slaughter post surgery to discuss

## 2022-11-29 NOTE — ASSESSMENT & PLAN NOTE
Renal function baseline  Cr  1.06   To   1.07   and  Glucose well controlled  A1c 5.8   Oct  2022    Hold jardiance   3 d prior to surgery   Resume when eating     Keep well hydrated

## 2022-11-29 NOTE — ASSESSMENT & PLAN NOTE
Acceptable risk for planned procedure  Of  L  CT release  Dr Vernon 12/9/2022   Pending labs per Dr Vernon      pt was having some ?  Skipped beats  Per cardiology  Loop recorder was without pathology and today EKG SR  LBBB  No arrhythmia no chest pain    Has dentures to remove prior to surgery   Hold Jardiance   3 d prior   And hold ASA  3 d prior and hold Vascepa until after surgery    EKG L BBB  HR  63 no acute ischemia    Has loop recorder and no events reported per cardiology yesterday   Labs per Dr Vernon  BP well controlled no chest pain and  A1c  5.8  Good control    Dr Slaughter reviewed EKG and chart and signed forms faxed to surgeon    Get covid bivalent after surgery

## 2022-11-29 NOTE — PATIENT INSTRUCTIONS
Preop general physical exam  Acceptable risk for planned procedure  Of  L  CT release  Dr Vernon 12/9/2022   Pending labs per Dr Vernon      pt was having some ?  Skipped beats  Per cardiology  Loop recorder was without pathology and today EKG SR  LBBB  No arrhythmia no chest pain    Has dentures to remove prior to surgery   Hold Jardiance   3 d prior   And hold ASA  3 d prior and hold Vascepa until after surgery    EKG L BBB  HR  63 no acute ischemia    Has loop recorder and no events reported per cardiology yesterday   Labs per Dr Vernon  BP well controlled no chest pain and  A1c  5.8  Good control    Dr Slaughter reviewed EKG and chart and signed forms faxed to surgeon      CAD (coronary artery disease)  Stable no chest pain   On ASA   Off plavix  Now    Off coreg now        Cerebellar infarct (CMS/HCC)  On ASA   Statin  Stable no further      CTS (carpal tunnel syndrome)  Left wrist  To have surgery failed  Injections   Dr Vernon     Diabetic peripheral neuropathy (CMS/HCC)   Gabapentin on hold per pt not effective     Get foot checks  At Podiatry  And good foot care        Hypertension   BP well controlled on losartan HCTZ       Insomnia  Difficulty falling asleep has taken occasional trazodone finds it to has not helped much anxious about surgery  Trazodone did not help    Sleep hygiene and will FU Dr Slaughter post surgery to discuss      Primary hyperparathyroidism (CMS/HCC)  Stable      Type 2 diabetes mellitus with stage 3a chronic kidney disease, without long-term current use of insulin (CMS/HCC)  Renal function baseline  Cr  1.06   To   1.07   and  Glucose well controlled  A1c 5.8   Oct  2022    Hold jardiance   3 d prior to surgery   Resume when eating     Keep well hydrated

## 2022-12-01 ENCOUNTER — TELEPHONE (OUTPATIENT)
Dept: PRIMARY CARE | Facility: CLINIC | Age: 77
End: 2022-12-01
Payer: MEDICARE

## 2022-12-01 NOTE — TELEPHONE ENCOUNTER
----- Message from KALYAN Frank sent at 12/1/2022  9:34 AM EST -----  Let know normal micro albumin Cr  ratio MARCI

## 2022-12-01 NOTE — TELEPHONE ENCOUNTER
"Spoke with patient advising of KALYAN Frank message, \"Let know normal micro albumin Cr  ratio.\"  Patient verbalized understanding.    "

## 2022-12-07 PROBLEM — M85.89 OSTEOPENIA OF MULTIPLE SITES: Status: ACTIVE | Noted: 2022-10-20

## 2022-12-07 NOTE — PROGRESS NOTES
Daily Progress Note      Subjective      Patient ID: Anita Gonzalez is a 77 y.o. female 1945  Chief Complaint   Patient presents with   • Post Nasal Drip     Here for URI sx PND   Tested negative at home for covid  Sx ongoing since  11/24/2022  Had preop on  11/29/2022  And  Did not mention sx  Exam  WNL     sx seem to be progressively worse  Constant swallowing  And clearing throat of mucous and  Denies  Water brash and heart burn no fever  Now right sinus pressure congestion  Using mucinex  D   claritin and  flonase  Not helping in last several days   Copious  Nasal  Dc and green  Dale discharge now     No ST     no exposures   covid test at home negative    No cough    Surgery was postponed    To 12/302022  For [facility]    Pt  A1C 5.8   Glucose readings at home  Stable  No hypoglycemia      Pt is schedule  12 9 2022   2 d for [facility]             The following have been reviewed and updated as appropriate in this visit:   Tobacco  Allergies  Meds  Med Hx  Surg Hx  Fam Hx  Soc Hx      Review of Systems   Constitutional: Positive for fatigue. Negative for chills, diaphoresis, fever and unexpected weight change.   HENT: Positive for hearing loss, postnasal drip, rhinorrhea, sinus pressure (right side  ) and sinus pain. Negative for dental problem, ear discharge, ear pain and sore throat.    Eyes: Positive for discharge (right eye noted by provider  ). Negative for visual disturbance.   Respiratory: Negative for cough, shortness of breath and wheezing.    Cardiovascular: Negative for chest pain, palpitations and leg swelling.   Gastrointestinal: Negative.    Musculoskeletal: Positive for arthralgias.   Neurological: Negative for light-headedness and headaches.   Hematological: Negative for adenopathy.   Psychiatric/Behavioral: Negative.      Patient Active Problem List   Diagnosis   • Hypertension   • Primary hyperparathyroidism (CMS/HCC)   • Type 2 diabetes mellitus with stage 3a chronic kidney disease,  without long-term current use of insulin (CMS/HCC)   • Cerebellar infarct (CMS/HCC)   • CTS (carpal tunnel syndrome)   • Diabetic peripheral neuropathy (CMS/HCC)   • Recurrent falls   • Meningoencephalitis   • Chest pain   • Insomnia   • Mixed diabetic hyperlipidemia associated with type 2 diabetes mellitus (CMS/HCC)   • CAD (coronary artery disease)   • Low HDL (under 40)   • LBBB (left bundle branch block)   • Preop general physical exam   • Osteopenia of multiple sites   • Acute non-recurrent maxillary sinusitis     Current Outpatient Medications   Medication Sig Dispense Refill   • aspirin 81 mg enteric coated tablet Take 81 mg by mouth daily.     • atorvastatin (LIPITOR) 40 mg tablet Take 40 mg by mouth once daily.     • cefdinir (OMNICEF) 300 mg capsule Take 1 capsule (300 mg total) by mouth 2 (two) times a day for 7 days. 14 capsule 0   • cholecalciferol, vitamin D3, 1,000 unit (25 mcg) tablet Take 1,000 Units by mouth daily.     • cinacalcet (SENSIPAR) 30 mg tablet Take 30 mg by mouth daily.     • ezetimibe (ZETIA) 10 mg tablet Take 10 mg by mouth once daily.     • hydrochlorothiazide (HYDRODIURIL) 12.5 mg tablet Take 12.5 mg by mouth once daily.     • JARDIANCE 10 mg tablet Take 10 mg by mouth once daily.     • losartan 100 mg tablet Take 100 mg by mouth daily.     • VASCEPA 1 gram capsule Take 2 capsules by mouth 2 (two) times a day with meals.       No current facility-administered medications for this visit.     History reviewed. No pertinent past medical history.  History reviewed. No pertinent family history.  Past Surgical History:   Procedure Laterality Date   • CARDIAC SURGERY  07/06/2021    Insertable Cardiac Monitor LINQ   • CARPAL TUNNEL RELEASE Right    • CORONARY STENT PLACEMENT  11/22/2021   • HYSTERECTOMY      Partial   • TONSILLECTOMY       Social History     Socioeconomic History   • Marital status:      Spouse name: Not on file   • Number of children: Not on file   • Years of  education: Not on file   • Highest education level: Not on file   Occupational History   • Not on file   Tobacco Use   • Smoking status: Never   • Smokeless tobacco: Never   Vaping Use   • Vaping Use: Never used   Substance and Sexual Activity   • Alcohol use: Yes     Comment: Rarely   • Drug use: Not Currently   • Sexual activity: Not Currently   Other Topics Concern   • Not on file   Social History Narrative    Retired Senior Clinical  rn, BSN,  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 [employer]    Lives with dtr, son in law , and grandson     Social Determinants of Health     Financial Resource Strain: Not on file   Food Insecurity: Not on file   Transportation Needs: Not on file   Physical Activity: Not on file   Stress: Not on file   Social Connections: Not on file   Intimate Partner Violence: Not on file   Housing Stability: Not on file     No Known Allergies    Current Outpatient Medications:   •  aspirin 81 mg enteric coated tablet, Take 81 mg by mouth daily., Disp: , Rfl:   •  atorvastatin (LIPITOR) 40 mg tablet, Take 40 mg by mouth once daily., Disp: , Rfl:   •  cefdinir (OMNICEF) 300 mg capsule, Take 1 capsule (300 mg total) by mouth 2 (two) times a day for 7 days., Disp: 14 capsule, Rfl: 0  •  cholecalciferol, vitamin D3, 1,000 unit (25 mcg) tablet, Take 1,000 Units by mouth daily., Disp: , Rfl:   •  cinacalcet (SENSIPAR) 30 mg tablet, Take 30 mg by mouth daily., Disp: , Rfl:   •  ezetimibe (ZETIA) 10 mg tablet, Take 10 mg by mouth once daily., Disp: , Rfl:   •  hydrochlorothiazide (HYDRODIURIL) 12.5 mg tablet, Take 12.5 mg by mouth once daily., Disp: , Rfl:   •  JARDIANCE 10 mg tablet, Take 10 mg by mouth once daily., Disp: , Rfl:   •  losartan 100 mg tablet, Take 100 mg by mouth daily., Disp: , Rfl:   •  VASCEPA 1 gram capsule, Take 2 capsules by mouth 2 (two) times a day with meals., Disp: , Rfl:   Health Maintenance   Topic Date Due   • DEXA Scan  Never done   • Annual Dilated  Retinal Exam  Never done   • Diabetic Foot Exam  Never done   • Depression Screening  Never done   • Medicare Annual Wellness Visit  Never done   • Hepatitis C Screening  Never done   • Zoster Vaccine (1 of 2) Never done   • DTaP, Tdap, and Td Vaccines (1 - Tdap) 07/14/2016   • Annual Falls Risk Screening  Never done   • COVID-19 Vaccine (5 - Booster for Pfizer series) 06/03/2022   • Diabetes Kidney Health Evaluation: eGFR  10/26/2022   • Diabetes Kidney Health Evaluation:  uACR  10/17/2023   • Hemoglobin A1C  10/17/2023   • Influenza Vaccine  Completed   • Pneumococcal (65 years and older)  Completed   • Meningococcal ACWY  Aged Out   • HIB Vaccines  Aged Out   • Hepatitis B Vaccines  Aged Out   • IPV Vaccines  Aged Out   • HPV Vaccines  Aged Out     Vitals:    12/08/22 0904   BP: 136/70   Pulse: 74   Resp: 16   Temp: 36.4 °C (97.6 °F)   SpO2: 99%     Objective     Results for orders placed or performed in visit on 12/01/22   Hemoglobin A1c   Result Value Ref Range    Hemoglobin A1C 5.8 %   Microalbumin/Creatinine Ur Random   Result Value Ref Range    Creatinine, Ur 117     Microalb, Ur 0.4     EXTERNAL MICROALBUMIN/CREATININE URINE 3      Vitals:    12/08/22 0904   BP: 136/70   Pulse: 74   Resp: 16   Temp: 36.4 °C (97.6 °F)   SpO2: 99%     Body mass index is 31.74 kg/m².  BP Readings from Last 3 Encounters:   12/08/22 136/70   11/29/22 120/68   06/28/22 (!) 114/52     Wt Readings from Last 3 Encounters:   12/08/22 81.9 kg (180 lb 9.6 oz)   11/29/22 82.4 kg (181 lb 9.6 oz)   06/28/22 82.6 kg (182 lb)     Vital signs reviewed  Physical Exam  Vitals reviewed.   Constitutional:       Appearance: She is obese.   HENT:      Head: Normocephalic.      Jaw: There is normal jaw occlusion.      Salivary Glands: Right salivary gland is not diffusely enlarged. Left salivary gland is not diffusely enlarged.      Comments: Nose thick gray dc  On right  And PND  Dale dc with swelling posterior pharynx  No erythema    No uvula  deviation     Tongue dry       Right Ear: Tympanic membrane, ear canal and external ear normal.      Left Ear: Tympanic membrane, ear canal and external ear normal.      Nose: Congestion and rhinorrhea present.      Mouth/Throat:      Mouth: Mucous membranes are moist.      Tongue: No lesions.      Palate: No mass.      Pharynx: Oropharynx is clear. Uvula midline. No oropharyngeal exudate or uvula swelling.      Comments: No tonsils    Eyes:      Extraocular Movements: Extraocular movements intact.      Conjunctiva/sclera: Conjunctivae normal.      Pupils: Pupils are equal, round, and reactive to light.      Comments: Right eye with some white exudate  Normal lacrimal gland    Conjunctiva  Clear      Cardiovascular:      Rate and Rhythm: Normal rate and regular rhythm.      Pulses: Normal pulses.      Heart sounds: Normal heart sounds. No murmur heard.  Pulmonary:      Effort: Pulmonary effort is normal.      Breath sounds: Normal breath sounds.   Musculoskeletal:      Cervical back: Neck supple.   Lymphadenopathy:      Cervical: No cervical adenopathy.   Skin:     General: Skin is warm.      Capillary Refill: Capillary refill takes less than 2 seconds.   Neurological:      Mental Status: She is alert and oriented to person, place, and time.   Psychiatric:         Mood and Affect: Mood normal.         Behavior: Behavior normal.             Chemistry        Component Value Date/Time     10/26/2021 1143    K 4.3 10/26/2021 1143     10/26/2021 1143    CO2 28 10/26/2021 1143    BUN 20 10/26/2021 1143    CREATININE 1.03 (H) 10/26/2021 1143        Component Value Date/Time    CALCIUM 10.1 10/26/2021 1143    ALKPHOS 52 10/26/2021 1143    AST 15 10/26/2021 1143    ALT 18 10/26/2021 1143    BILITOT 0.7 10/26/2021 1143          Lab Results   Component Value Date    HGBA1C 5.8 10/17/2022     Lab Results   Component Value Date    TSH 1.56 10/26/2021     Lab Results   Component Value Date    WBC 7.0 10/26/2021    HGB  12.5 10/26/2021    HCT 37.8 10/26/2021    MCV 87.9 10/26/2021     10/26/2021       Lab Results   Component Value Date    LDLCALC 68 10/26/2021       Assessment and Plan   Acute non-recurrent maxillary sinusitis  Right sided  Gray dc  And pharynx with PND  Thick   Afebrile   Will have start Omnicef   300 mg    Take  2 daily  At same time    Warm salt water gargles  Every  Few hours    Hydration    Continue mucinex   1200 mg    Twice daily   And flonase and  claritin 10 mg daily   If not improve d   5 - 7 d to FU        Type 2 diabetes mellitus with stage 3a chronic kidney disease, without long-term current use of insulin (CMS/[facility])  Stable  Controlled  A1c  5.8    States eye UTD  Will have MD send and  Get foot exam     GFR scanned to     FU with endocrine     Call if glucose elevating when ill   Get covid bivalent  Post op       CTS (carpal tunnel syndrome)  Surgery deferred to  12/302022 per surgeon pt to call if not improved in next week with sinus              KALYAN Frank  12/8/2022

## 2022-12-08 ENCOUNTER — OFFICE VISIT (OUTPATIENT)
Dept: PRIMARY CARE | Facility: CLINIC | Age: 77
End: 2022-12-08
Payer: MEDICARE

## 2022-12-08 VITALS
SYSTOLIC BLOOD PRESSURE: 136 MMHG | HEART RATE: 74 BPM | TEMPERATURE: 97.6 F | HEIGHT: 63 IN | OXYGEN SATURATION: 99 % | WEIGHT: 180.6 LBS | BODY MASS INDEX: 32 KG/M2 | DIASTOLIC BLOOD PRESSURE: 70 MMHG | RESPIRATION RATE: 16 BRPM

## 2022-12-08 DIAGNOSIS — E11.22 TYPE 2 DIABETES MELLITUS WITH STAGE 3A CHRONIC KIDNEY DISEASE, WITHOUT LONG-TERM CURRENT USE OF INSULIN (CMS/HCC): ICD-10-CM

## 2022-12-08 DIAGNOSIS — G56.00 CARPAL TUNNEL SYNDROME, UNSPECIFIED LATERALITY: ICD-10-CM

## 2022-12-08 DIAGNOSIS — J01.00 ACUTE NON-RECURRENT MAXILLARY SINUSITIS: Primary | ICD-10-CM

## 2022-12-08 DIAGNOSIS — N18.31 TYPE 2 DIABETES MELLITUS WITH STAGE 3A CHRONIC KIDNEY DISEASE, WITHOUT LONG-TERM CURRENT USE OF INSULIN (CMS/HCC): ICD-10-CM

## 2022-12-08 PROCEDURE — G8752 SYS BP LESS 140: HCPCS | Performed by: NURSE PRACTITIONER

## 2022-12-08 PROCEDURE — G8754 DIAS BP LESS 90: HCPCS | Performed by: NURSE PRACTITIONER

## 2022-12-08 PROCEDURE — 99213 OFFICE O/P EST LOW 20 MIN: CPT | Performed by: NURSE PRACTITIONER

## 2022-12-08 RX ORDER — CEFDINIR 300 MG/1
300 CAPSULE ORAL 2 TIMES DAILY
Qty: 14 CAPSULE | Refills: 0 | Status: SHIPPED | OUTPATIENT
Start: 2022-12-08 | End: 2022-12-15

## 2022-12-08 ASSESSMENT — ENCOUNTER SYMPTOMS
HEADACHES: 0
UNEXPECTED WEIGHT CHANGE: 0
FATIGUE: 1
GASTROINTESTINAL NEGATIVE: 1
DIAPHORESIS: 0
SHORTNESS OF BREATH: 0
RHINORRHEA: 1
PALPITATIONS: 0
COUGH: 0
WHEEZING: 0
FEVER: 0
SINUS PRESSURE: 1
EYE DISCHARGE: 1
ARTHRALGIAS: 1
ADENOPATHY: 0
SINUS PAIN: 1
LIGHT-HEADEDNESS: 0
CHILLS: 0
SORE THROAT: 0
PSYCHIATRIC NEGATIVE: 1

## 2022-12-08 NOTE — ASSESSMENT & PLAN NOTE
Stable  Controlled  A1c  5.8    States eye UTD  Will have MD send and  Get foot exam     GFR scanned to     FU with endocrine     Call if glucose elevating when ill   Get covid bivalent  Post op

## 2022-12-08 NOTE — ASSESSMENT & PLAN NOTE
Right sided  Gray dc  And pharynx with PND  Thick   Afebrile   Will have start Omnicef   300 mg    Take  2 daily  At same time    Warm salt water gargles  Every  Few hours    Hydration    Continue mucinex   1200 mg    Twice daily   And flonase and  claritin 10 mg daily   If not improve d   5 - 7 d to FU

## 2022-12-08 NOTE — PATIENT INSTRUCTIONS
Acute non-recurrent maxillary sinusitis  Right sided  Gray dc  And pharynx with PND  Thick   Afebrile   Will have start Omnicef   300 mg    Take  2 daily  At same time    Warm salt water gargles  Every  Few hours    Hydration    Continue mucinex   1200 mg    Twice daily   And flonase and  claritin 10 mg daily   If not improve d   5 - 7 d to FU        Type 2 diabetes mellitus with stage 3a chronic kidney disease, without long-term current use of insulin (CMS/[facility])  Stable  Controlled  A1c  5.8    States eye UTD  Will have MD send and  Get foot exam     GFR scanned to     FU with endocrine     Call if glucose elevating when ill   Get covid bivalent  Post op       CTS (carpal tunnel syndrome)  Surgery deferred to  12/302022 per surgeon pt to call if not improved in next week with sinus

## 2022-12-28 ENCOUNTER — TELEPHONE (OUTPATIENT)
Dept: PRIMARY CARE | Facility: CLINIC | Age: 77
End: 2022-12-28
Payer: MEDICARE

## 2022-12-28 NOTE — TELEPHONE ENCOUNTER
Shanique Cesar (Dr. Hernandez's office) called asking for a letter that is dated after 12/1 that clears pt for surgery, as the 11/29 date is 1 day out of the scope for approved surgery clearance.  I see she was seen the beginning of this month and it does state that the surgery was deferred, and I did not see anything that she was not cleared.  If letter is ok, we are to fax to F#[phone].  Thanks

## 2022-12-28 NOTE — TELEPHONE ENCOUNTER
Pt is not having palpations and  URI resolved  okay to proceed to surgery letter written please fax to surgeon    [phone]

## 2023-02-09 ENCOUNTER — OFFICE VISIT (OUTPATIENT)
Dept: PRIMARY CARE | Facility: CLINIC | Age: 78
End: 2023-02-09
Payer: MEDICARE

## 2023-02-09 VITALS
HEIGHT: 63 IN | RESPIRATION RATE: 16 BRPM | WEIGHT: 178 LBS | OXYGEN SATURATION: 97 % | TEMPERATURE: 98.5 F | SYSTOLIC BLOOD PRESSURE: 154 MMHG | DIASTOLIC BLOOD PRESSURE: 80 MMHG | HEART RATE: 66 BPM | BODY MASS INDEX: 31.54 KG/M2

## 2023-02-09 DIAGNOSIS — E21.0 PRIMARY HYPERPARATHYROIDISM (CMS/HCC): ICD-10-CM

## 2023-02-09 DIAGNOSIS — I63.9 CEREBELLAR INFARCT (CMS/HCC): ICD-10-CM

## 2023-02-09 DIAGNOSIS — E11.42 DIABETIC PERIPHERAL NEUROPATHY (CMS/HCC): ICD-10-CM

## 2023-02-09 DIAGNOSIS — G47.00 INSOMNIA, UNSPECIFIED TYPE: ICD-10-CM

## 2023-02-09 DIAGNOSIS — N18.31 TYPE 2 DIABETES MELLITUS WITH STAGE 3A CHRONIC KIDNEY DISEASE, WITHOUT LONG-TERM CURRENT USE OF INSULIN (CMS/HCC): Primary | ICD-10-CM

## 2023-02-09 DIAGNOSIS — E11.22 TYPE 2 DIABETES MELLITUS WITH STAGE 3A CHRONIC KIDNEY DISEASE, WITHOUT LONG-TERM CURRENT USE OF INSULIN (CMS/HCC): Primary | ICD-10-CM

## 2023-02-09 PROCEDURE — G8753 SYS BP > OR = 140: HCPCS | Performed by: INTERNAL MEDICINE

## 2023-02-09 PROCEDURE — G8754 DIAS BP LESS 90: HCPCS | Performed by: INTERNAL MEDICINE

## 2023-02-09 PROCEDURE — 99214 OFFICE O/P EST MOD 30 MIN: CPT | Performed by: INTERNAL MEDICINE

## 2023-02-09 RX ORDER — PREGABALIN 50 MG/1
50 CAPSULE ORAL 2 TIMES DAILY
Qty: 60 CAPSULE | Refills: 1 | Status: SHIPPED | OUTPATIENT
Start: 2023-02-09 | End: 2023-09-01 | Stop reason: ALTCHOICE

## 2023-02-09 NOTE — PROGRESS NOTES
Daily Progress Note      Subjective      Patient ID: Anita Gonzalez is a 78 y.o. female.  Chief Complaint   Patient presents with   • Diabetes     She is having a lot of pain in her feet. Gabapentin was not helping.    • Hyperlipidemia   • Hypertension   • Insomnia     She is having trouble staying asleep for the past 4 months. She does have trouble falling asleep. She took Trazodone 2 times but does not remember if that helped.   • Sore Throat     For 2 days. Lozenges, Mucinex, Claritin and gargling with salt water helps. Her home covid test was negative yesterday.     HPI  *  Hard time sleeping- hrd time falling and staying asleep        Pain in feet  Stopped gabapentin because it was not helping was only on 300 mg and was on it for a few months.  Top of feet to ankles  Hurts most at night  Aching pain      The following have been reviewed and updated as appropriate in this visit:        Review of Systems  Patient Active Problem List   Diagnosis   • Hypertension   • Primary hyperparathyroidism (CMS/HCC)   • Type 2 diabetes mellitus with stage 3a chronic kidney disease, without long-term current use of insulin (CMS/HCC)   • Cerebellar infarct (CMS/HCC)   • CTS (carpal tunnel syndrome)   • Diabetic peripheral neuropathy (CMS/HCC)   • Recurrent falls   • Meningoencephalitis   • Chest pain   • Insomnia   • Mixed diabetic hyperlipidemia associated with type 2 diabetes mellitus (CMS/HCC)   • CAD (coronary artery disease)   • Low HDL (under 40)   • LBBB (left bundle branch block)   • Preop general physical exam   • Osteopenia of multiple sites   • Acute non-recurrent maxillary sinusitis     Current Outpatient Medications   Medication Sig Dispense Refill   • aspirin 81 mg enteric coated tablet Take 81 mg by mouth daily.     • atorvastatin (LIPITOR) 40 mg tablet Take 40 mg by mouth once daily.     • cholecalciferol, vitamin D3, 1,000 unit (25 mcg) tablet Take 1,000 Units by mouth daily.     • cinacalcet (SENSIPAR) 30 mg  tablet Take 30 mg by mouth daily.     • ezetimibe (ZETIA) 10 mg tablet Take 10 mg by mouth once daily.     • hydrochlorothiazide (HYDRODIURIL) 12.5 mg tablet Take 12.5 mg by mouth once daily.     • JARDIANCE 10 mg tablet Take 10 mg by mouth once daily.     • losartan 100 mg tablet Take 100 mg by mouth daily.     • pregabalin (LYRICA) 50 mg capsule Take 1 capsule (50 mg total) by mouth 2 (two) times a day. 60 capsule 1   • VASCEPA 1 gram capsule Take 2 capsules by mouth 2 (two) times a day with meals.       No current facility-administered medications for this visit.     No past medical history on file.  No family history on file.  Past Surgical History:   Procedure Laterality Date   • CARDIAC SURGERY  07/06/2021    Insertable Cardiac Monitor LINQ   • CARPAL TUNNEL RELEASE Right    • CARPAL TUNNEL RELEASE Left 01/2023   • CORONARY STENT PLACEMENT  11/22/2021   • HYSTERECTOMY      Partial   • TONSILLECTOMY       Social History     Socioeconomic History   • Marital status:      Spouse name: Not on file   • Number of children: Not on file   • Years of education: Not on file   • Highest education level: Not on file   Occupational History   • Not on file   Tobacco Use   • Smoking status: Never   • Smokeless tobacco: Never   Vaping Use   • Vaping Use: Never used   Substance and Sexual Activity   • Alcohol use: Yes     Comment: Rarely   • Drug use: Not Currently   • Sexual activity: Not Currently   Other Topics Concern   • Not on file   Social History Narrative    Retired Senior Clinical  rn, BSN,  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 [employer]    Lives with dtr, son in law , and grandson     Social Determinants of Health     Financial Resource Strain: Not on file   Food Insecurity: Not on file   Transportation Needs: Not on file   Physical Activity: Not on file   Stress: Not on file   Social Connections: Not on file   Intimate Partner Violence: Not on file   Housing Stability: Not on file      No Known Allergies  Vitals:    02/09/23 1742   BP: (!) 154/80   Pulse: 66   Resp: 16   Temp: 36.9 °C (98.5 °F)   SpO2: 97%     Objective     Results for orders placed or performed in visit on 12/08/22   Comprehensive metabolic panel   Result Value Ref Range    eGFR 54.0 mL/min/1.73m*2     Vitals:    02/09/23 1742   BP: (!) 154/80   Pulse: 66   Resp: 16   Temp: 36.9 °C (98.5 °F)   SpO2: 97%     Vital signs reviewed  Physical Exam  Constitutional:       Appearance: She is well-developed.   HENT:      Head: Normocephalic and atraumatic.   Eyes:      Conjunctiva/sclera: Conjunctivae normal.   Cardiovascular:      Rate and Rhythm: Normal rate and regular rhythm.      Heart sounds: Normal heart sounds.   Pulmonary:      Effort: Pulmonary effort is normal. No respiratory distress.      Breath sounds: Normal breath sounds. No wheezing.   Abdominal:      General: Bowel sounds are normal.      Palpations: Abdomen is soft.   Musculoskeletal:         General: No deformity.      Cervical back: Normal range of motion and neck supple.      Right lower leg: No edema.      Left lower leg: No edema.   Skin:     General: Skin is warm and dry.   Neurological:      Mental Status: She is alert and oriented to person, place, and time.   Psychiatric:         Behavior: Behavior normal.             Chemistry        Component Value Date/Time     10/26/2021 1143    K 4.3 10/26/2021 1143     10/26/2021 1143    CO2 28 10/26/2021 1143    BUN 20 10/26/2021 1143    CREATININE 1.03 (H) 10/26/2021 1143        Component Value Date/Time    CALCIUM 10.1 10/26/2021 1143    ALKPHOS 52 10/26/2021 1143    AST 15 10/26/2021 1143    ALT 18 10/26/2021 1143    BILITOT 0.7 10/26/2021 1143          Lab Results   Component Value Date    HGBA1C 5.8 10/17/2022     Lab Results   Component Value Date    TSH 1.56 10/26/2021     Lab Results   Component Value Date    WBC 7.0 10/26/2021    HGB 12.5 10/26/2021    HCT 37.8 10/26/2021    MCV 87.9 10/26/2021      10/26/2021       Lab Results   Component Value Date    LDLCALC 68 10/26/2021       Assessment and Plan   Primary hyperparathyroidism (CMS/HCC)  Remains on Sensipar.  Update His metabolic profile    Type 2 diabetes mellitus with stage 3a chronic kidney disease, without long-term current use of insulin (CMS/[facility])  Excellent control of diabetes she continues to follow with endocrinology at [place] she remains on Jardiance    Cerebellar infarct (CMS/[facility])  History of cerebellar infarct has declined NICOLE.  Did link monitor that did not show any evidence of atrial fibrillation.  Remains on aspirin atorvastatin    Diabetic peripheral neuropathy (CMS/[facility])  Significant painful feet.  Neurology started her on gabapentin.  She did not uptitrate the dose but felt it did not work.  After discussion today we have decided to try Lyrica for its easier titration to see if will help with her symptoms.  If it does not she may be a candidate for duloxetine.  I do think the pain from neuropathy is significantly affecting her sleep pattern    Insomnia  Hard time falling asleep but harder time staying asleep she is waking up with pain in her feet.  This is due to neuropathy.  She tried gabapentin without relief we will add Lyrica to see if that makes a difference.      Orders Placed This Encounter   Procedures   • Lipid Prof w Refl     Standing Status:   Future     Number of Occurrences:   1     Standing Expiration Date:   2/9/2024     Order Specific Question:   Release to patient     Answer:   Immediate   • Hemoglobin A1c     Standing Status:   Future     Number of Occurrences:   1     Standing Expiration Date:   2/9/2024     Order Specific Question:   Release to patient     Answer:   Immediate   • Comprehensive metabolic panel     Standing Status:   Future     Number of Occurrences:   1     Standing Expiration Date:   2/9/2024     Order Specific Question:   Release to patient     Answer:   Immediate         This note was  created using speech recognition software. Any errors are unintentional. If you have any questions or need clarification please call.  Catarino Slaughter MD  2/9/2023

## 2023-02-10 NOTE — ASSESSMENT & PLAN NOTE
Significant painful feet.  Neurology started her on gabapentin.  She did not uptitrate the dose but felt it did not work.  After discussion today we have decided to try Lyrica for its easier titration to see if will help with her symptoms.  If it does not she may be a candidate for duloxetine.  I do think the pain from neuropathy is significantly affecting her sleep pattern

## 2023-02-10 NOTE — ASSESSMENT & PLAN NOTE
Excellent control of diabetes she continues to follow with endocrinology at [place] she remains on Jardiance

## 2023-02-10 NOTE — ASSESSMENT & PLAN NOTE
Hard time falling asleep but harder time staying asleep she is waking up with pain in her feet.  This is due to neuropathy.  She tried gabapentin without relief we will add Lyrica to see if that makes a difference.

## 2023-02-10 NOTE — ASSESSMENT & PLAN NOTE
History of cerebellar infarct has declined NICOLE.  Did link monitor that did not show any evidence of atrial fibrillation.  Remains on aspirin atorvastatin

## 2023-02-14 ENCOUNTER — HOSPITAL ENCOUNTER (OUTPATIENT)
Facility: CLINIC | Age: 78
Discharge: STILL ADMITTED OR ADMINISTRATIVE | End: 2023-02-14
Payer: MEDICARE

## 2023-03-24 LAB
ALBUMIN SERPL-MCNC: 4.4 G/DL (ref 3.6–5.1)
ALBUMIN/GLOB SERPL: 2 (CALC) (ref 1–2.5)
ALP SERPL-CCNC: 51 U/L (ref 37–153)
ALT SERPL-CCNC: 17 U/L (ref 6–29)
AST SERPL-CCNC: 17 U/L (ref 10–35)
BILIRUB SERPL-MCNC: 0.6 MG/DL (ref 0.2–1.2)
BUN SERPL-MCNC: 20 MG/DL (ref 7–25)
BUN/CREAT SERPL: 19 (CALC) (ref 6–22)
CALCIUM SERPL-MCNC: 9.7 MG/DL (ref 8.6–10.4)
CHLORIDE SERPL-SCNC: 105 MMOL/L (ref 98–110)
CHOLEST SERPL-MCNC: 113 MG/DL
CHOLEST/HDLC SERPL: 3 (CALC)
CO2 SERPL-SCNC: 29 MMOL/L (ref 20–32)
CREAT SERPL-MCNC: 1.05 MG/DL (ref 0.6–1)
EGFRCR SERPLBLD CKD-EPI 2021: 55 ML/MIN/1.73M2
GLOBULIN SER CALC-MCNC: 2.2 G/DL (CALC) (ref 1.9–3.7)
GLUCOSE SERPL-MCNC: 100 MG/DL (ref 65–99)
HBA1C MFR BLD: 5.9 % OF TOTAL HGB
HDLC SERPL-MCNC: 38 MG/DL
LDLC SERPL CALC-MCNC: 56 MG/DL (CALC)
NONHDLC SERPL-MCNC: 75 MG/DL (CALC)
POTASSIUM SERPL-SCNC: 4.3 MMOL/L (ref 3.5–5.3)
PROT SERPL-MCNC: 6.6 G/DL (ref 6.1–8.1)
SODIUM SERPL-SCNC: 142 MMOL/L (ref 135–146)
TRIGL SERPL-MCNC: 111 MG/DL

## 2023-04-07 ENCOUNTER — OFFICE VISIT (OUTPATIENT)
Dept: PRIMARY CARE | Facility: CLINIC | Age: 78
End: 2023-04-07
Payer: MEDICARE

## 2023-04-07 VITALS
HEIGHT: 63 IN | RESPIRATION RATE: 16 BRPM | WEIGHT: 176 LBS | BODY MASS INDEX: 31.18 KG/M2 | HEART RATE: 70 BPM | TEMPERATURE: 98 F | DIASTOLIC BLOOD PRESSURE: 68 MMHG | SYSTOLIC BLOOD PRESSURE: 130 MMHG | OXYGEN SATURATION: 98 %

## 2023-04-07 DIAGNOSIS — N18.31 TYPE 2 DIABETES MELLITUS WITH STAGE 3A CHRONIC KIDNEY DISEASE, WITHOUT LONG-TERM CURRENT USE OF INSULIN (CMS/HCC): ICD-10-CM

## 2023-04-07 DIAGNOSIS — R35.0 URINARY FREQUENCY: ICD-10-CM

## 2023-04-07 DIAGNOSIS — B37.31 CANDIDIASIS OF FEMALE GENITALIA: Primary | ICD-10-CM

## 2023-04-07 DIAGNOSIS — E11.22 TYPE 2 DIABETES MELLITUS WITH STAGE 3A CHRONIC KIDNEY DISEASE, WITHOUT LONG-TERM CURRENT USE OF INSULIN (CMS/HCC): ICD-10-CM

## 2023-04-07 LAB
BILIRUBIN, POC: NEGATIVE
BLOOD URINE, POC: NEGATIVE
CLARITY, POC: CLEAR
COLOR, POC: YELLOW
EXPIRATION DATE: NORMAL
GLUCOSE URINE, POC: NORMAL
KETONES, POC: NEGATIVE
LEUKOCYTE EST, POC: NEGATIVE
Lab: NORMAL
NITRITE, POC: NEGATIVE
PH, POC: 5
POCT MANUFACTURER: NORMAL
PROTEIN, POC: NEGATIVE
SPECIFIC GRAVITY, POC: 1.02
UROBILINOGEN, POC: 0.2

## 2023-04-07 PROCEDURE — G8752 SYS BP LESS 140: HCPCS | Performed by: NURSE PRACTITIONER

## 2023-04-07 PROCEDURE — G8754 DIAS BP LESS 90: HCPCS | Performed by: NURSE PRACTITIONER

## 2023-04-07 PROCEDURE — 99213 OFFICE O/P EST LOW 20 MIN: CPT | Performed by: NURSE PRACTITIONER

## 2023-04-07 PROCEDURE — 81002 URINALYSIS NONAUTO W/O SCOPE: CPT | Performed by: NURSE PRACTITIONER

## 2023-04-07 RX ORDER — FLUCONAZOLE 150 MG/1
150 TABLET ORAL ONCE
Qty: 2 TABLET | Refills: 0 | Status: SHIPPED | OUTPATIENT
Start: 2023-04-07 | End: 2023-04-07

## 2023-04-07 ASSESSMENT — ENCOUNTER SYMPTOMS
FREQUENCY: 1
ADENOPATHY: 0
GASTROINTESTINAL NEGATIVE: 1
HEMATURIA: 0
PSYCHIATRIC NEGATIVE: 1
UNEXPECTED WEIGHT CHANGE: 0
PALPITATIONS: 0
FLANK PAIN: 0
DYSURIA: 1
NEUROLOGICAL NEGATIVE: 1
MUSCULOSKELETAL NEGATIVE: 1
DIFFICULTY URINATING: 0
RESPIRATORY NEGATIVE: 1

## 2023-04-07 NOTE — PATIENT INSTRUCTIONS
Urinary frequency  Likely due to  Jardiance therapy  Urine has glucose  1000 consistent with this no infection no nitrites no leukocytes  No blood in urine      Candidiasis of female genitalia   Likely secondary to  Jardiance therapy  Will have pt stop this and call endocrine for guidance    Use wet wipes for cleansing  Avoid  Any perfumed products   Discussed  Using  Priscilla bottle water wash after voiding   Start Diflucan  150 mg  Once today  And repeat in   3 d if not improved  Call if not improve  Use  Cotton panties loose clothing        Type 2 diabetes mellitus with stage 3a chronic kidney disease, without long-term current use of insulin (CMS/[facility])  Last  A1c   Good  5.9  Will contact  Endocrine regarding yeast infection and jardiance use  I recommended stopping

## 2023-04-07 NOTE — ASSESSMENT & PLAN NOTE
Last  A1c   Good  5.9  Will contact  Endocrine regarding yeast infection and jardiance use  I recommended stopping

## 2023-04-07 NOTE — ASSESSMENT & PLAN NOTE
Likely secondary to  Jardiance therapy  Will have pt stop this and call endocrine for guidance    Use wet wipes for cleansing  Avoid  Any perfumed products   Discussed  Using  Priscilla bottle water wash after voiding   Start Diflucan  150 mg  Once today  And repeat in   3 d if not improved  Call if not improve  Use  Cotton panties loose clothing

## 2023-04-07 NOTE — ASSESSMENT & PLAN NOTE
Likely due to  Jardiance therapy  Urine has glucose  1000 consistent with this no infection no nitrites no leukocytes  No blood in urine

## 2023-04-07 NOTE — PROGRESS NOTES
Daily Progress Note      Subjective      Patient ID: Anita Gonzalez is a 78 y.o. female 1945  Chief Complaint   Patient presents with   • UTI     Here for urinary burning  And frequency started  4/3/2023   Burning and frequency  ,  Used vaseline  To area    No fever no chills    Has some urge in continence  Pt has burning with urine touching skin    pt is on Jardiance    ? Minor  vaginal itching or dc      PMH  DM  Last  A1c   5.9  On  3/23/23  cerebral infarct  HTN  CAD  Primary hyperparathyoidism  Neuropathy LE  Insomnia    Was started on  Lyrica in Feb 2023  Per Dr Slaughter        cr  Was  1.05          The following have been reviewed and updated as appropriate in this visit:   Tobacco  Allergies  Problems       Review of Systems   Constitutional: Negative for unexpected weight change.   HENT: Negative.    Respiratory: Negative.    Cardiovascular: Negative for chest pain and palpitations.   Gastrointestinal: Negative.    Genitourinary: Positive for dysuria, frequency, urgency and vaginal pain. Negative for decreased urine volume, difficulty urinating, flank pain, hematuria, pelvic pain and vaginal discharge.   Musculoskeletal: Negative.    Neurological: Negative.    Hematological: Negative for adenopathy.   Psychiatric/Behavioral: Negative.      Patient Active Problem List   Diagnosis   • Hypertension   • Primary hyperparathyroidism (CMS/HCC)   • Type 2 diabetes mellitus with stage 3a chronic kidney disease, without long-term current use of insulin (CMS/HCC)   • Cerebellar infarct (CMS/HCC)   • CTS (carpal tunnel syndrome)   • Diabetic peripheral neuropathy (CMS/HCC)   • Recurrent falls   • Meningoencephalitis   • Chest pain   • Insomnia   • Mixed diabetic hyperlipidemia associated with type 2 diabetes mellitus (CMS/HCC)   • CAD (coronary artery disease)   • Low HDL (under 40)   • LBBB (left bundle branch block)   • Preop general physical exam   • Osteopenia of multiple sites   • Acute non-recurrent  maxillary sinusitis   • Urinary frequency   • Candidiasis of female genitalia     Current Outpatient Medications   Medication Sig Dispense Refill   • aspirin 81 mg enteric coated tablet Take 81 mg by mouth daily.     • atorvastatin (LIPITOR) 40 mg tablet Take 40 mg by mouth once daily.     • cholecalciferol, vitamin D3, 1,000 unit (25 mcg) tablet Take 1,000 Units by mouth daily.     • cinacalcet (SENSIPAR) 30 mg tablet Take 30 mg by mouth daily.     • ezetimibe (ZETIA) 10 mg tablet Take 10 mg by mouth once daily.     • fluconazole (DIFLUCAN) 150 mg tablet Take 1 tablet (150 mg total) by mouth once for 1 dose. May repeat in   3 d  X 1 if still with sx 2 tablet 0   • hydrochlorothiazide (HYDRODIURIL) 12.5 mg tablet Take 12.5 mg by mouth once daily.     • JARDIANCE 10 mg tablet Take 10 mg by mouth once daily.     • losartan 100 mg tablet Take 100 mg by mouth daily.     • pregabalin (LYRICA) 50 mg capsule Take 1 capsule (50 mg total) by mouth 2 (two) times a day. 60 capsule 1   • VASCEPA 1 gram capsule Take 2 capsules by mouth 2 (two) times a day with meals.       No current facility-administered medications for this visit.     History reviewed. No pertinent past medical history.  History reviewed. No pertinent family history.  Past Surgical History:   Procedure Laterality Date   • CARDIAC SURGERY  07/06/2021    Insertable Cardiac Monitor LINQ   • CARPAL TUNNEL RELEASE Right    • CARPAL TUNNEL RELEASE Left 01/2023   • CORONARY STENT PLACEMENT  11/22/2021   • HYSTERECTOMY      Partial   • TONSILLECTOMY       Social History     Socioeconomic History   • Marital status:      Spouse name: Not on file   • Number of children: Not on file   • Years of education: Not on file   • Highest education level: Not on file   Occupational History   • Not on file   Tobacco Use   • Smoking status: Never   • Smokeless tobacco: Never   Vaping Use   • Vaping status: Never Used   Substance and Sexual Activity   • Alcohol use: Yes      Comment: Rarely   • Drug use: Not Currently   • Sexual activity: Not Currently   Other Topics Concern   • Not on file   Social History Narrative    Retired Senior Clinical  rn, BSN,  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 [employer]    Lives with dtr, son in law , and grandson     Social Determinants of Health     Financial Resource Strain: Not on file   Food Insecurity: Not on file   Transportation Needs: Not on file   Physical Activity: Not on file   Stress: Not on file   Social Connections: Not on file   Intimate Partner Violence: Not on file   Housing Stability: Not on file     No Known Allergies    Current Outpatient Medications:   •  aspirin 81 mg enteric coated tablet, Take 81 mg by mouth daily., Disp: , Rfl:   •  atorvastatin (LIPITOR) 40 mg tablet, Take 40 mg by mouth once daily., Disp: , Rfl:   •  cholecalciferol, vitamin D3, 1,000 unit (25 mcg) tablet, Take 1,000 Units by mouth daily., Disp: , Rfl:   •  cinacalcet (SENSIPAR) 30 mg tablet, Take 30 mg by mouth daily., Disp: , Rfl:   •  ezetimibe (ZETIA) 10 mg tablet, Take 10 mg by mouth once daily., Disp: , Rfl:   •  fluconazole (DIFLUCAN) 150 mg tablet, Take 1 tablet (150 mg total) by mouth once for 1 dose. May repeat in   3 d  X 1 if still with sx, Disp: 2 tablet, Rfl: 0  •  hydrochlorothiazide (HYDRODIURIL) 12.5 mg tablet, Take 12.5 mg by mouth once daily., Disp: , Rfl:   •  JARDIANCE 10 mg tablet, Take 10 mg by mouth once daily., Disp: , Rfl:   •  losartan 100 mg tablet, Take 100 mg by mouth daily., Disp: , Rfl:   •  pregabalin (LYRICA) 50 mg capsule, Take 1 capsule (50 mg total) by mouth 2 (two) times a day., Disp: 60 capsule, Rfl: 1  •  VASCEPA 1 gram capsule, Take 2 capsules by mouth 2 (two) times a day with meals., Disp: , Rfl:   Health Maintenance   Topic Date Due   • DEXA Scan  Never done   • Annual Dilated Retinal Exam  Never done   • Diabetic Foot Exam  Never done   • Depression Screening  Never done   • Medicare Annual  Wellness Visit  Never done   • Hepatitis C Screening  Never done   • Zoster Vaccine (1 of 2) Never done   • Falls Risk Screening  Never done   • DTaP, Tdap, and Td Vaccines (1 - Tdap) 07/14/2016   • COVID-19 Vaccine (5 - Booster for Pfizer series) 06/03/2022   • Breast Cancer Screening  Never done   • Diabetes Kidney Health Evaluation:  uACR  10/17/2023   • Diabetes Kidney Health Evaluation: eGFR  03/23/2024   • Hemoglobin A1C  03/23/2024   • Influenza Vaccine  Completed   • Pneumococcal (65 years and older)  Completed   • Meningococcal ACWY  Aged Out   • HIB Vaccines  Aged Out   • Hepatitis B Vaccines  Aged Out   • IPV Vaccines  Aged Out   • HPV Vaccines  Aged Out     Vitals:    04/07/23 1052   BP: 130/68   Pulse: 70   Resp: 16   Temp: 36.7 °C (98 °F)   SpO2: 98%     Objective     Results for orders placed or performed in visit on 02/09/23   Lipid Prof w Refl   Result Value Ref Range    Cholesterol, Total 113 <200 mg/dL    Hdl Cholesterol 38 (L) > OR = 50 mg/dL    Triglycerides 111 <150 mg/dL    Ldl-Cholesterol 56 mg/dL (calc)    Chol/Hdlc Ratio 3.0 <5.0 (calc)    Non Hdl Cholesterol 75 <130 mg/dL (calc)   Hemoglobin A1c   Result Value Ref Range    Hemoglobin A1C 5.9 (H) <5.7 % of total Hgb   Comprehensive metabolic panel   Result Value Ref Range    Glucose 100 (H) 65 - 99 mg/dL    BUN 20 7 - 25 mg/dL    Creatinine 1.05 (H) 0.60 - 1.00 mg/dL    EGFR 55 (L) > OR = 60 mL/min/1.73m2    Bun/Creatinine Ratio 19 6 - 22 (calc)    Sodium 142 135 - 146 mmol/L    Potassium 4.3 3.5 - 5.3 mmol/L    Chloride 105 98 - 110 mmol/L    Carbon Dioxide 29 20 - 32 mmol/L    Calcium 9.7 8.6 - 10.4 mg/dL    Protein, Total 6.6 6.1 - 8.1 g/dL    Albumin 4.4 3.6 - 5.1 g/dL    Globulin 2.2 1.9 - 3.7 g/dL (calc)    Albumin/Globulin Ratio 2.0 1.0 - 2.5 (calc)    Bilirubin, Total 0.6 0.2 - 1.2 mg/dL    Alkaline Phosphatase 51 37 - 153 U/L    Ast 17 10 - 35 U/L    Alt 17 6 - 29 U/L     Vitals:    04/07/23 1052   BP: 130/68   Pulse: 70   Resp: 16    Temp: 36.7 °C (98 °F)   SpO2: 98%     Body mass index is 31.18 kg/m².  BP Readings from Last 3 Encounters:   04/07/23 130/68   02/09/23 (!) 154/80   12/08/22 136/70     Wt Readings from Last 3 Encounters:   04/07/23 79.8 kg (176 lb)   02/09/23 80.7 kg (178 lb)   12/08/22 81.9 kg (180 lb 9.6 oz)     Vital signs reviewed  Physical Exam  Vitals reviewed.   Constitutional:       Appearance: Normal appearance.   HENT:      Head: Normocephalic.   Eyes:      Conjunctiva/sclera: Conjunctivae normal.      Pupils: Pupils are equal, round, and reactive to light.   Cardiovascular:      Rate and Rhythm: Normal rate and regular rhythm.      Pulses: Normal pulses.      Heart sounds: Normal heart sounds.   Pulmonary:      Effort: Pulmonary effort is normal.      Breath sounds: Normal breath sounds.   Abdominal:      General: Bowel sounds are normal.      Palpations: Abdomen is soft.      Tenderness: There is no right CVA tenderness or left CVA tenderness.   Genitourinary:     Exam position: Supine.          Comments:  No chaperone     Lymphadenopathy:      Lower Body: No right inguinal adenopathy. No left inguinal adenopathy.   Skin:     General: Skin is warm and dry.      Findings: No rash.   Neurological:      Mental Status: She is alert and oriented to person, place, and time.   Psychiatric:         Mood and Affect: Mood normal.         Behavior: Behavior normal.             Chemistry        Component Value Date/Time     03/23/2023 0929    K 4.3 03/23/2023 0929     03/23/2023 0929    CO2 29 03/23/2023 0929    BUN 20 03/23/2023 0929    CREATININE 1.05 (H) 03/23/2023 0929        Component Value Date/Time    CALCIUM 9.7 03/23/2023 0929    ALKPHOS 51 03/23/2023 0929    AST 17 03/23/2023 0929    ALT 17 03/23/2023 0929    BILITOT 0.6 03/23/2023 0929          Lab Results   Component Value Date    HGBA1C 5.9 (H) 03/23/2023     Lab Results   Component Value Date    TSH 1.56 10/26/2021     Lab Results   Component Value Date     WBC 7.0 10/26/2021    HGB 12.5 10/26/2021    HCT 37.8 10/26/2021    MCV 87.9 10/26/2021     10/26/2021       Lab Results   Component Value Date    LDLCALC 56 03/23/2023       Assessment and Plan   Urinary frequency  Likely due to  Jardiance therapy  Urine has glucose  1000 consistent with this no infection no nitrites no leukocytes  No blood in urine      Candidiasis of female genitalia   Likely secondary to  Jardiance therapy  Will have pt stop this and call endocrine for guidance    Use wet wipes for cleansing  Avoid  Any perfumed products   Discussed  Using  Priscilla bottle water wash after voiding   Start Diflucan  150 mg  Once today  And repeat in   3 d if not improved  Call if not improve  Use  Cotton panties loose clothing        Type 2 diabetes mellitus with stage 3a chronic kidney disease, without long-term current use of insulin (CMS/[facility])  Last  A1c   Good  5.9  Will contact  Endocrine regarding yeast infection and jardiance use  I recommended stopping           KALYAN Frank  4/7/2023

## 2023-09-01 ENCOUNTER — CONSULT (OUTPATIENT)
Dept: PRIMARY CARE | Facility: CLINIC | Age: 78
End: 2023-09-01
Payer: COMMERCIAL

## 2023-09-01 VITALS
DIASTOLIC BLOOD PRESSURE: 72 MMHG | BODY MASS INDEX: 32.07 KG/M2 | TEMPERATURE: 98.1 F | HEART RATE: 72 BPM | SYSTOLIC BLOOD PRESSURE: 132 MMHG | WEIGHT: 181 LBS | HEIGHT: 63 IN | RESPIRATION RATE: 14 BRPM | OXYGEN SATURATION: 99 %

## 2023-09-01 DIAGNOSIS — I65.23 CAROTID STENOSIS, BILATERAL: ICD-10-CM

## 2023-09-01 DIAGNOSIS — Z01.818 PREOP GENERAL PHYSICAL EXAM: Primary | ICD-10-CM

## 2023-09-01 DIAGNOSIS — S52.561K: ICD-10-CM

## 2023-09-01 DIAGNOSIS — E21.0 PRIMARY HYPERPARATHYROIDISM (CMS/HCC): ICD-10-CM

## 2023-09-01 DIAGNOSIS — S52.614G: ICD-10-CM

## 2023-09-01 DIAGNOSIS — N18.31 TYPE 2 DIABETES MELLITUS WITH STAGE 3A CHRONIC KIDNEY DISEASE, WITHOUT LONG-TERM CURRENT USE OF INSULIN (CMS/HCC): ICD-10-CM

## 2023-09-01 DIAGNOSIS — E11.22 TYPE 2 DIABETES MELLITUS WITH STAGE 3A CHRONIC KIDNEY DISEASE, WITHOUT LONG-TERM CURRENT USE OF INSULIN (CMS/HCC): ICD-10-CM

## 2023-09-01 PROBLEM — J01.00 ACUTE NON-RECURRENT MAXILLARY SINUSITIS: Status: RESOLVED | Noted: 2022-12-08 | Resolved: 2023-09-01

## 2023-09-01 PROBLEM — B37.31 CANDIDIASIS OF FEMALE GENITALIA: Status: RESOLVED | Noted: 2023-04-07 | Resolved: 2023-09-01

## 2023-09-01 PROCEDURE — 99214 OFFICE O/P EST MOD 30 MIN: CPT | Performed by: NURSE PRACTITIONER

## 2023-09-01 PROCEDURE — 3008F BODY MASS INDEX DOCD: CPT | Performed by: NURSE PRACTITIONER

## 2023-09-01 ASSESSMENT — ENCOUNTER SYMPTOMS
PALPITATIONS: 0
CHILLS: 0
FATIGUE: 0
GASTROINTESTINAL NEGATIVE: 1
WHEEZING: 0
PSYCHIATRIC NEGATIVE: 1
ARTHRALGIAS: 1
SHORTNESS OF BREATH: 0
FEVER: 0
UNEXPECTED WEIGHT CHANGE: 0
FREQUENCY: 1
DIZZINESS: 0
ADENOPATHY: 0
JOINT SWELLING: 1
DIAPHORESIS: 0
BRUISES/BLEEDS EASILY: 1

## 2023-09-01 ASSESSMENT — PATIENT HEALTH QUESTIONNAIRE - PHQ9: SUM OF ALL RESPONSES TO PHQ9 QUESTIONS 1 & 2: 0

## 2023-09-01 NOTE — ASSESSMENT & PLAN NOTE
Acceptable risk for planned procedure of ORIF right wrist on 9/8/2023   Dr Vernon   Labs pending  9/2/2023  Per surgeon        has letter of cardiology clearance Dr Herrera  Loop recorder was without pathology and the ER  EKG SR  LBBB  No acute ischemia   No arrhythmia no chest pain    Has dentures to remove prior to surgery   Hold Jardiance 2 d prior   And CONTINUE  ASA per cardiology recommendations surgeon aware  and hold Vascepa until after surgery    BP well controlled no chest pain and  A1c  5.9  Good control    EKG done  In ER  On    8/28/2023  SR LBBB no acute ischemia  Faxed     Dr Slaughter reviewed EKG and chart and signed forms faxed to surgeon    Recommend  FU this office post op  Vaccines   DM check up

## 2023-09-01 NOTE — ASSESSMENT & PLAN NOTE
Last A1c  Was  In March 2023   5.9   In ER  Glucose   103  Originally and  Repeat   217 after IV       On Jardiance   Recommend hold 2 Day prior to surgery  Keep well hydrated   Pt needs to up date DM eye and foot exam   FU this office Dr Slaughter     No home glucose readings

## 2023-09-01 NOTE — PATIENT INSTRUCTIONS
Carotid stenosis, bilateral  > 70%  On left to FU Dr Ortiz vascular   Continue  ASA  Therapy  Per Cardiology       Primary hyperparathyroidism (CMS/HCC)  Follows with endocrine  Dr Cummings  Seen in April 2023    On  sensipar   Calcium WNL at  9.6      Type 2 diabetes mellitus with stage 3a chronic kidney disease, without long-term current use of insulin (CMS/HCC)  Last A1c  Was  In March 2023   5.9   In ER  Glucose   103  Originally and  Repeat   217 after IV       On Jardiance   Recommend hold 2 Day prior to surgery  Keep well hydrated   Pt needs to up date DM eye and foot exam   FU this office Dr Slaughter     No home glucose readings      Preop general physical exam  Acceptable risk for planned procedure of ORIF right wrist on 9/8/2023   Dr Vernon   Labs pending  9/2/2023  Per surgeon        has letter of cardiology clearance Dr Herrera  Loop recorder was without pathology and the ER  EKG SR  LBBB  No acute ischemia   No arrhythmia no chest pain    Has dentures to remove prior to surgery   Hold Jardiance 2 d prior   And CONTINUE  ASA per cardiology recommendations surgeon aware  and hold Vascepa until after surgery    BP well controlled no chest pain and  A1c  5.9  Good control    EKG done  In ER  On    8/28/2023  SR LBBB no acute ischemia  Faxed     Dr Slaughter reviewed EKG and chart and signed forms faxed to surgeon    Recommend  FU this office post op  Vaccines   DM check up       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

## 2023-09-01 NOTE — ASSESSMENT & PLAN NOTE
To have surgery  Dr Vernon  9/8/2023  Continue to wear splint   Distal CMS intact   Tylenol up to  3000 mg daily for pain
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
## 2023-09-25 ENCOUNTER — TELEPHONE (OUTPATIENT)
Dept: PRIMARY CARE | Facility: CLINIC | Age: 78
End: 2023-09-25
Payer: MEDICARE

## 2023-09-25 NOTE — TELEPHONE ENCOUNTER
Can start Paxlovid.  If she does she needs to hold atorvastatin while taking this med    Paxlovid can cause metallic taste and diarrhea.  Phenomenon known as rebound has also been experienced for 8 to 10 days later recurrence of symptoms and recurrence of positive testing may occur.  If rebound occurs additional treatment is not indicated but additional isolation is.

## 2023-09-25 NOTE — TELEPHONE ENCOUNTER
"1. COVID-19 DIAGNOSIS: \"Who made your COVID-19 diagnosis?\" \"Was it confirmed by a positive lab test or self-test?    Home Test - Last Night        2. COVID-19 EXPOSURE: \"Was there any known exposure to COVID before the symptoms began?\" [facility] Definition of close contact: within 6 feet (2 meters) for a total of 15 minutes or more over a 24-hour period.     Babysits granddaughter who was sick, but did not test for COVID        3. ONSET: \"When did the COVID-19 symptoms start?\"       Yesterday - Cough & body aches (no fever)    Patient Instructions:     Stay home for 5 days.   If you have no symptoms or your symptoms are resolving after 5 days, you can leave your house.   Continue to wear a mask around others for 5 additional days.    If you have a fever, continue to stay home until your fever resolves.    Keep hydrated, Tylenol 3000gm maximum dose daily, rest    Serious symptoms, go ER immediately   Difficulty breathing or shortness of breath   Chest pain or pressure   Lower leg/calf pain (concern for blood clot)        Discussed serious sx & when to go to the ER; advised quarantine timing and to take Tylenol, Musinex and Delysum/Robutusin.  "

## 2023-12-04 ENCOUNTER — PATIENT OUTREACH (OUTPATIENT)
Dept: CASE MANAGEMENT | Facility: CLINIC | Age: 78
End: 2023-12-04
Payer: MEDICARE

## 2023-12-04 ASSESSMENT — ENCOUNTER SYMPTOMS
WOUND: 1
FATIGUE: 1
FEVER: 0
LIGHT-HEADEDNESS: 1

## 2023-12-04 NOTE — PROGRESS NOTES
"  NAME: Anita Gonzalez    MRN: [id number]    YOB: 1945    Event Review:    Initial TCM Patient Outreach Date: 12/04/23    Assessment completed with: Patient     Discharge Diagnosis: Stenosis of left carotid artery    Patient readmitted in the last 30 days: No  Discharging Facility: [facility]  Date of Last Admission: 12/01/23  Date of Last Discharge: 12/02/23               Patient's perception of their health status since discharge: Improving    HPI:  Patient presented to the hospital for a planned procedure.  She underwent a L CEA on 12/1/23 and was subsequently discharged home on POD#1.  Patient reports that she felt a little lightheaded this morning that she attributes to her diabetes.  She states that her blood sugar was 100, she ate some food and felt better.  Patient shared that she is managing the \"achy\" surgical site pain with Tylenol at this time.  ACM encouraged patient to monitor her temperature/surgical site, and if any symptoms persist or worsen notify her vascular surgeon.  Anita was appreciative of BALAJI call.    Review of Systems   Constitutional: Positive for fatigue. Negative for fever.   Skin: Positive for wound.        Neck surgical incision - Patient reports LINDA, glued, swollen; denies any drainage, increased redness or warmth.   Neurological: Positive for light-headedness.       Medication Review:    Medication Review: Yes     Reported by: Patient  Any new medications prescribed at discharge?: Yes  Is the patient having any side effects they believe may be caused by any medication additions or changes?: No  New prescriptions filled?: No  If No, please explain: Has not picked up medication (Patient reports did not  oxycodone as she does not need it)  Do you have enough of your regularly prescribed medications?: Yes       Medication adherence problem?: No  Was a medication discrepancy indentified?: No       Nursing Interventions: Nurse provided " patient education  Reconciled the current and discharge medications: Yes  Reviewed AVS (Discharge Instructions)?: Yes         Acute Pain:    Acute pain: Yes  Limitation of routine activities due to acute pain?: no  Acute pain severity: 4  Acute pain timing: intermittent  Location of acute pain: left posterior neck area    Chronic Pain:                     Diet/Nutrition:    Type of Diet: Diabetic  Diet Adherence: Adherent with diet          Home Care Services:          Home Care Interventions: No intervention required     Post-Discharge Durable Medical Equipment::    Durable Medical Equipment: Glucometer  Does patient's condition require a scale?: No     Oxygen Use: No              DME Interventions: No intervention required    Home Management:    Living Arrangement: Child  Support System:: Family, Child/Children  Type of Residence: [address] [facility] [place]  Home Monitoring: Temperature, Blood Sugars  Any patient reported falls in the last 3 months?: No       Appointment Scheduling:    PCP appointment scheduled: Yes  TCM Appointment Type: BALAJI 14 Day  Appointment Date: 12/14/23              Follow-Ups:       Relevant Specialist Follow-ups: Cardiology - December 13th / Vascular Surgery - 1 month    Interventions/ Care Coordination:    Interventions/ Care Coordination: Encouraged patient to call PCP/Specialist, Encouraged patient to schedule with the PCP/Specialist, Addressed a knowledge deficit, Assisted patient in the scheduling of an appointment  General Education: Post-Op instructions, Diabetes       Reviewed signs/symptoms of worsening condition or complication that necessitate a call to the Physician's office.  Educated patient on access to care.  RN phone number given for future care management.    Twan Gupta RN  [phone]

## 2023-12-07 ENCOUNTER — OFFICE VISIT (OUTPATIENT)
Dept: PRIMARY CARE | Facility: CLINIC | Age: 78
End: 2023-12-07
Payer: MEDICARE

## 2023-12-07 VITALS
DIASTOLIC BLOOD PRESSURE: 72 MMHG | HEART RATE: 77 BPM | RESPIRATION RATE: 16 BRPM | BODY MASS INDEX: 31.29 KG/M2 | WEIGHT: 176.6 LBS | HEIGHT: 63 IN | OXYGEN SATURATION: 97 % | TEMPERATURE: 98.2 F | SYSTOLIC BLOOD PRESSURE: 132 MMHG

## 2023-12-07 DIAGNOSIS — Z92.89 H/O BEING HOSPITALIZED: ICD-10-CM

## 2023-12-07 DIAGNOSIS — E83.42 HYPOMAGNESEMIA: ICD-10-CM

## 2023-12-07 DIAGNOSIS — N18.31 TYPE 2 DIABETES MELLITUS WITH STAGE 3A CHRONIC KIDNEY DISEASE, WITHOUT LONG-TERM CURRENT USE OF INSULIN (CMS/HCC): ICD-10-CM

## 2023-12-07 DIAGNOSIS — E11.22 TYPE 2 DIABETES MELLITUS WITH STAGE 3A CHRONIC KIDNEY DISEASE, WITHOUT LONG-TERM CURRENT USE OF INSULIN (CMS/HCC): ICD-10-CM

## 2023-12-07 DIAGNOSIS — R09.82 POST-NASAL DRIP: ICD-10-CM

## 2023-12-07 DIAGNOSIS — Z98.890 STATUS POST CAROTID ENDARTERECTOMY: Primary | ICD-10-CM

## 2023-12-07 DIAGNOSIS — D72.829 LEUKOCYTOSIS, UNSPECIFIED TYPE: ICD-10-CM

## 2023-12-07 PROCEDURE — G8752 SYS BP LESS 140: HCPCS | Performed by: NURSE PRACTITIONER

## 2023-12-07 PROCEDURE — 1111F DSCHRG MED/CURRENT MED MERGE: CPT | Performed by: NURSE PRACTITIONER

## 2023-12-07 PROCEDURE — 99496 TRANSJ CARE MGMT HIGH F2F 7D: CPT | Performed by: NURSE PRACTITIONER

## 2023-12-07 PROCEDURE — G8754 DIAS BP LESS 90: HCPCS | Performed by: NURSE PRACTITIONER

## 2023-12-07 ASSESSMENT — ENCOUNTER SYMPTOMS
SORE THROAT: 1
FATIGUE: 0
MUSCULOSKELETAL NEGATIVE: 1
TREMORS: 0
ADENOPATHY: 0
CHILLS: 0
SPEECH DIFFICULTY: 0
FACIAL ASYMMETRY: 0
RHINORRHEA: 1
SHORTNESS OF BREATH: 0
WEAKNESS: 0
NUMBNESS: 1
PSYCHIATRIC NEGATIVE: 1
DIZZINESS: 0
WHEEZING: 0
GASTROINTESTINAL NEGATIVE: 1
UNEXPECTED WEIGHT CHANGE: 0
FEVER: 0
VOICE CHANGE: 1
DIAPHORESIS: 0
SINUS PRESSURE: 1
PALPITATIONS: 0

## 2023-12-07 NOTE — PROGRESS NOTES
Daily Progress Note      Subjective      Patient ID: Anita Gonzalez is a 78 y.o. female 1945  Chief Complaint   Patient presents with   • URI     Started 6 days ago. Currently having, sinus pain, mucous, coughing     Here for nasal congestion   Cough  Since   12/2 2023  Day after had  Carotid  Left   Dr Ortiz  On  12/1 2023     Is congested   Mild   Has initial Sore throat x one day  And  PND   Stuff nose is better  And   Mild cough   Some PND causing  No wheezing no chest pain  No palpations     Has glue on surgical wound   Some bruising no dc  No erythema  Pt is not dizzy  No chest pain  No change in mental status no weakness    No fever    Some low energy     Was dc from hospital  12.2. 2023   Has tried nothing but gargle salt water helps some       Had covid infection in   sept 2023        WBC  12/2   12.6    No diff  And  Mg   1.7  Is on supplement        Negative covid at home   Due for flu and covid booster   Glucose running  100 in am  Denies hypoglycemia      HPI:   Anita Gonzalez is a 78 y.o. female presenting for follow-up after hospital discharge.    Patient readmitted in the last 30 days: No    Discharge Diagnosis: Stenosis of left carotid artery  Discharging Facility: [facility]  Date of Last Admission: 12/01/23  Date of Last Discharge: 12/02/23      Relevant Specialist Follow-ups: Cardiology - December 13th / Vascular Surgery - 1 month    Initial TCM Patient Outreach Date: 12/04/23    Summary of Hospital Course: Patient presented to the hospital for a planned procedure.  She underwent a L CEA on 12/1/23 and was subsequently discharged home on POD#1 slightly low mag  1.7       Medications prescribed at discharge reconciled with current ambulatory medication list:  Yes.    Inpatient testing (labs, imaging, cardiovascular) requiring follow-up: mg  And CBC       History since hospital discharge: feeling well see above    Wound intact no bleeding no dc    Having ST PND   Hoarseness of voice         Advance Care Planning Documents    There are no Advance Care Planning documents on file.      PMH DM    Hyperparathyroidism HDL HTN  CVA CAD            The following have been reviewed and updated as appropriate in this visit:   Tobacco  Allergies  Meds  Problems  Med Hx  Surg Hx  Fam Hx  Soc   Hx      Review of Systems   Constitutional: Negative for chills, diaphoresis, fatigue, fever and unexpected weight change.   HENT: Positive for congestion, postnasal drip, rhinorrhea, sinus pressure, sore throat and voice change.    Eyes: Negative for visual disturbance.   Respiratory: Negative for shortness of breath and wheezing.    Cardiovascular: Negative for chest pain and palpitations.   Gastrointestinal: Negative.    Genitourinary: Negative.    Musculoskeletal: Negative.    Neurological: Positive for numbness (left jaw line post op  ). Negative for dizziness, tremors, syncope, facial asymmetry, speech difficulty and weakness.   Hematological: Negative for adenopathy.   Psychiatric/Behavioral: Negative.      Patient Active Problem List   Diagnosis   • Hypertension   • Primary hyperparathyroidism (CMS/HCC)   • Type 2 diabetes mellitus with stage 3a chronic kidney disease, without long-term current use of insulin (CMS/HCC)   • Cerebellar infarct (CMS/HCC)   • CTS (carpal tunnel syndrome)   • Diabetic peripheral neuropathy (CMS/HCC)   • Recurrent falls   • Meningoencephalitis   • Chest pain   • Insomnia   • Mixed diabetic hyperlipidemia associated with type 2 diabetes mellitus (CMS/HCC)    • CAD (coronary artery disease)   • Low HDL (under 40)   • LBBB (left bundle branch block)   • Preop general physical exam   • Osteopenia of multiple sites   • Urinary frequency   • Carotid stenosis, bilateral   • 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     • atorvastatin (LIPITOR) 40 mg tablet Take 40 mg by mouth once daily.     • cholecalciferol, vitamin D3, 1,000 unit (25 mcg) tablet Take 1,000 Units by mouth daily.     • cinacalcet (SENSIPAR) 30 mg tablet Take 30 mg by mouth daily.     • ezetimibe (ZETIA) 10 mg tablet Take 10 mg by mouth once daily.     • hydrochlorothiazide (HYDRODIURIL) 12.5 mg tablet Take 12.5 mg by mouth once daily.     • JARDIANCE 10 mg tablet Take 10 mg by mouth once daily.     • losartan 100 mg tablet Take 100 mg by mouth daily.     • VASCEPA 1 gram capsule Take 2 capsules by mouth 2 (two) times a day with meals.       No current facility-administered medications for this visit.     Past Medical History:   Diagnosis Date   • COVID-19 09/24/2023   • Hypertension    • Type 2 diabetes mellitus (CMS/HCC)      History reviewed. No pertinent family history.  Past Surgical History:   Procedure Laterality Date   • CARDIAC SURGERY  07/06/2021    Insertable Cardiac Monitor LINQ   • CAROTID ENDARTERECTOMY Left 12/01/2023   • CARPAL TUNNEL RELEASE Right    • CARPAL TUNNEL RELEASE Left 01/2023   • CORONARY STENT PLACEMENT  11/22/2021   • HYSTERECTOMY      Partial   • TONSILLECTOMY     • WRIST SURGERY Right 09/15/2023     Social History     Socioeconomic History   • Marital status:      Spouse name: Not on file   • Number of children: Not on file   • Years of education: Not on file   • Highest education level: Not on file   Occupational History   • Not on file   Tobacco Use   • Smoking status: Never   • Smokeless tobacco: Never   Vaping Use   • Vaping Use: Never used   Substance and Sexual Activity   • Alcohol use: Yes     Comment: Rarely   • Drug use: Not Currently   • Sexual activity: Not Currently    Other Topics Concern   • Not on file   Social History Narrative    Retired Senior Clinical  rn, BSN,  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 [employer]    Lives with dtr, son in law , and grandson     Social Determinants of Health     Financial Resource Strain: Not on file   Food Insecurity: Not on file   Transportation Needs: Not on file   Physical Activity: Not on file   Stress: Not on file   Social Connections: Not on file   Intimate Partner Violence: Not on file   Housing Stability: Not on file     No Known Allergies    Current Outpatient Medications:   •  aspirin 81 mg enteric coated tablet, Take 81 mg by mouth daily., Disp: , Rfl:   •  atorvastatin (LIPITOR) 40 mg tablet, Take 40 mg by mouth once daily., Disp: , Rfl:   •  cholecalciferol, vitamin D3, 1,000 unit (25 mcg) tablet, Take 1,000 Units by mouth daily., Disp: , Rfl:   •  cinacalcet (SENSIPAR) 30 mg tablet, Take 30 mg by mouth daily., Disp: , Rfl:   •  ezetimibe (ZETIA) 10 mg tablet, Take 10 mg by mouth once daily., Disp: , Rfl:   •  hydrochlorothiazide (HYDRODIURIL) 12.5 mg tablet, Take 12.5 mg by mouth once daily., Disp: , Rfl:   •  JARDIANCE 10 mg tablet, Take 10 mg by mouth once daily., Disp: , Rfl:   •  losartan 100 mg tablet, Take 100 mg by mouth daily., Disp: , Rfl:   •  VASCEPA 1 gram capsule, Take 2 capsules by mouth 2 (two) times a day with meals., Disp: , Rfl:   Health Maintenance   Topic Date Due   • DEXA Scan  Never done   • Annual Dilated Retinal Exam  Never done   • Diabetic Foot Exam  Never done   • Hepatitis C Screening  Never done   • Zoster Vaccine (1 of 2) Never done   • DTaP, Tdap, and Td Vaccines (1 - Tdap) 07/14/2016   • Breast Cancer Screening  Never done   • Influenza Vaccine (1) 08/01/2023   • COVID-19 Vaccine (5 - 2023-24 season) 09/01/2023   • Diabetes Kidney Health Evaluation:  uACR  10/17/2023   • Diabetes Kidney Health Evaluation: eGFR  03/23/2024   • Depression Screening  09/01/2024   • Falls Risk  Screening  09/01/2024   • Hemoglobin A1C  11/09/2024   • Pneumococcal (65 years and older)  Completed   • Meningococcal ACWY  Aged Out   • HIB Vaccines  Aged Out   • Hepatitis B Vaccines  Aged Out   • IPV Vaccines  Aged Out   • HPV Vaccines  Aged Out     Vitals:    12/07/23 1443   BP: 132/72   Pulse: 77   Resp: 16   Temp: 36.8 °C (98.2 °F)   SpO2: 97%     Objective     Results for orders placed or performed in visit on 04/07/23   POCT urinalysis dipstick   Result Value Ref Range    Color, UA Yellow     Clarity, UA Clear     Glucose, UA 4+     Bilirubin, UA Negative     Ketones, UA Negative     Spec Grav, UA 1.020     Blood, UA Negative     pH, UA 5.0     Protein, UA Negative     Urobilinogen, UA 0.2     Leukocytes, UA Negative     Nitrite, UA Negative     Expiration Date 01/31/2024     Lot Number [id number]     POCT  ROCHE      Vitals:    12/07/23 1443   BP: 132/72   Pulse: 77   Resp: 16   Temp: 36.8 °C (98.2 °F)   SpO2: 97%     Body mass index is 31.28 kg/m².  BP Readings from Last 3 Encounters:   12/07/23 132/72   09/01/23 132/72   04/07/23 130/68     Wt Readings from Last 3 Encounters:   12/07/23 80.1 kg (176 lb 9.6 oz)   09/01/23 82.1 kg (181 lb)   04/07/23 79.8 kg (176 lb)     Vital signs reviewed  Physical Exam  Vitals reviewed.   Constitutional:       Appearance: She is obese.   HENT:      Head: Normocephalic.      Right Ear: Tympanic membrane, ear canal and external ear normal.      Left Ear: Tympanic membrane, ear canal and external ear normal.      Nose: Rhinorrhea (clear  no rhinorrhea  ) present.      Mouth/Throat:      Mouth: Mucous membranes are moist.      Pharynx: No posterior oropharyngeal erythema.      Comments:  Hoarse voice   And clear PND   Uvula no swelling    Eyes:      Extraocular Movements: Extraocular movements intact.      Conjunctiva/sclera: Conjunctivae normal.      Pupils: Pupils are equal, round, and reactive to light.   Neck:      Thyroid: No thyroid mass or thyroid  tenderness.      Vascular: Normal carotid pulses. No carotid bruit.      Trachea: Trachea normal. No tracheal tenderness or tracheal deviation.        Comments: Stiff  ROJM  Neck due to surgical wound no hematoma     Cardiovascular:      Rate and Rhythm: Normal rate and regular rhythm.      Pulses: Normal pulses.      Heart sounds: Normal heart sounds. No murmur heard.  Pulmonary:      Effort: Pulmonary effort is normal. No respiratory distress.      Breath sounds: Normal breath sounds. No stridor. No wheezing, rhonchi or rales.   Abdominal:      General: Bowel sounds are normal.      Palpations: Abdomen is soft.   Musculoskeletal:         General: Normal range of motion.      Cervical back: No crepitus. No pain with movement.   Lymphadenopathy:      Cervical: No cervical adenopathy.      Right cervical: No superficial, deep or posterior cervical adenopathy.     Left cervical: No superficial, deep or posterior cervical adenopathy.   Skin:     Capillary Refill: Capillary refill takes less than 2 seconds.      Findings: No rash.   Neurological:      Mental Status: She is alert and oriented to person, place, and time.   Psychiatric:         Behavior: Behavior normal.             Chemistry        Component Value Date/Time     03/23/2023 0929    K 4.3 03/23/2023 0929     03/23/2023 0929    CO2 29 03/23/2023 0929    BUN 20 03/23/2023 0929    CREATININE 1.05 (H) 03/23/2023 0929        Component Value Date/Time    CALCIUM 9.7 03/23/2023 0929    ALKPHOS 51 03/23/2023 0929    AST 17 03/23/2023 0929    ALT 17 03/23/2023 0929    BILITOT 0.6 03/23/2023 0929          Lab Results   Component Value Date    HGBA1C 5.9 (H) 03/23/2023     Lab Results   Component Value Date    TSH 1.56 10/26/2021     Lab Results   Component Value Date    WBC 7.0 10/26/2021    HGB 12.5 10/26/2021    HCT 37.8 10/26/2021    MCV 87.9 10/26/2021     10/26/2021       Lab Results   Component Value Date    LDLCALC 56 03/23/2023        Assessment and Plan   Status post carotid endarterectomy   Left side  Wound surgical healing well no dc   FU with Surgeon in  Jan and cardiology  12/13/2023    Doing well no hypotension         Type 2 diabetes mellitus with stage 3a chronic kidney disease, without long-term current use of insulin (CMS/[facility])  Glucose at home good  Fasting  100   Stable      Post-nasal drip   Pt having  Congestion and PND mild cough  Post op  Was intubated  12/1/2023  For carotid  Endarterectomy    Clear secretions   And clear PND  Lungs clear no wheezing  Negative home covid test  And  No fever   Likely some sequela of surgery intubation do not think has infection bacterial  Or  Viral URI   Will have pt  Start coricidin HBP  OTC  Anti histamine   For  PND    And  Start  Mucinex   1200 mg   One orally every  12 hours    May use flonase nasal steroid   1spray each nostril  At night   Humidify air to  50%    Nasal saline  To nose as needed     voice rest and continue gargling   If  Fever short of breath  Chest pain  Worsening not improved cough  HA  Purulent change nasal dc  Call       Hypomagnesemia  Check Mg   Is on supplement       Elevated white blood cell count, unspecified   Check  CBC diff  WBC   12.6 on   12/2          H/O being hospitalized   Post carotid endarterectomy on left   12/1/2023  FU cardiology on  12/13 2023  Surgeon   In Jan 2024 and schedule  US  Per  Dr Ortiz    Stable  Wound intact no dc             KALYAN Frank  12/7/2023

## 2023-12-07 NOTE — PATIENT INSTRUCTIONS
Status post carotid endarterectomy   Left side  Wound surgical healing well no dc   FU with Surgeon in  Jan and cardiology  12/13/2023    Doing well no hypotension         Type 2 diabetes mellitus with stage 3a chronic kidney disease, without long-term current use of insulin (CMS/[facility])  Glucose at home good  Fasting  100   Stable      Post-nasal drip   Pt having  Congestion and PND mild cough  Post op  Was intubated  12/1/2023  For carotid  Endarterectomy    Clear secretions   And clear PND  Lungs clear no wheezing  Negative home covid test  And  No fever   Likely some sequela of surgery intubation do not think has infection bacterial  Or  Viral URI   Will have pt  Start coricidin HBP  OTC  Anti histamine   For  PND    And  Start  Mucinex   1200 mg   One orally every  12 hours    May use flonase nasal steroid   1spray each nostril  At night   Humidify air to  50%    Nasal saline  To nose as needed     voice rest and continue gargling   If  Fever short of breath  Chest pain  Worsening not improved cough  HA  Purulent change nasal dc  Call       Hypomagnesemia  Check Mg   Is on supplement       Elevated white blood cell count, unspecified   Check  CBC diff  WBC   12.6 on   12/2

## 2023-12-07 NOTE — ASSESSMENT & PLAN NOTE
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

## 2023-12-07 NOTE — ASSESSMENT & PLAN NOTE
Post carotid endarterectomy on left   12/1/2023  FU cardiology on  12/13 2023  Surgeon   In Jan 2024 and schedule  US  Per  Dr Ortiz    Stable  Wound intact no dc

## 2023-12-07 NOTE — ASSESSMENT & PLAN NOTE
Pt having  Congestion and PND mild cough  Post op  Was intubated  12/1/2023  For carotid  Endarterectomy    Clear secretions   And clear PND  Lungs clear no wheezing  Negative home covid test  And  No fever   Likely some sequela of surgery intubation do not think has infection bacterial  Or  Viral URI   Will have pt  Start coricidin HBP  OTC  Anti histamine   For  PND    And  Start  Mucinex   1200 mg   One orally every  12 hours    May use flonase nasal steroid   1spray each nostril  At night   Humidify air to  50%    Nasal saline  To nose as needed     voice rest and continue gargling   If  Fever short of breath  Chest pain  Worsening not improved cough  HA  Purulent change nasal dc  Call

## 2023-12-09 LAB
BASOPHILS # BLD AUTO: 51 CELLS/UL (ref 0–200)
BASOPHILS NFR BLD AUTO: 0.7 %
EOSINOPHIL # BLD AUTO: 431 CELLS/UL (ref 15–500)
EOSINOPHIL NFR BLD AUTO: 5.9 %
ERYTHROCYTE [DISTWIDTH] IN BLOOD BY AUTOMATED COUNT: 12.8 % (ref 11–15)
HCT VFR BLD AUTO: 38.4 % (ref 35–45)
HGB BLD-MCNC: 12.8 G/DL (ref 11.7–15.5)
LYMPHOCYTES # BLD AUTO: 1555 CELLS/UL (ref 850–3900)
LYMPHOCYTES NFR BLD AUTO: 21.3 %
MAGNESIUM SERPL-MCNC: 1.9 MG/DL (ref 1.5–2.5)
MCH RBC QN AUTO: 29.4 PG (ref 27–33)
MCHC RBC AUTO-ENTMCNC: 33.3 G/DL (ref 32–36)
MCV RBC AUTO: 88.3 FL (ref 80–100)
MONOCYTES # BLD AUTO: 657 CELLS/UL (ref 200–950)
MONOCYTES NFR BLD AUTO: 9 %
NEUTROPHILS # BLD AUTO: 4606 CELLS/UL (ref 1500–7800)
NEUTROPHILS NFR BLD AUTO: 63.1 %
PLATELET # BLD AUTO: 209 THOUSAND/UL (ref 140–400)
PMV BLD REES-ECKER: 10.6 FL (ref 7.5–12.5)
RBC # BLD AUTO: 4.35 MILLION/UL (ref 3.8–5.1)
WBC # BLD AUTO: 7.3 THOUSAND/UL (ref 3.8–10.8)

## 2023-12-11 ENCOUNTER — TELEPHONE (OUTPATIENT)
Dept: PRIMARY CARE | Facility: CLINIC | Age: 78
End: 2023-12-11
Payer: MEDICARE

## 2023-12-11 NOTE — TELEPHONE ENCOUNTER
----- Message from KALYAN Frank sent at 12/10/2023  5:08 PM EST -----  Let pt know Mg  WNL CBC WNL

## 2024-03-01 ENCOUNTER — OFFICE VISIT (OUTPATIENT)
Dept: PRIMARY CARE | Facility: CLINIC | Age: 79
End: 2024-03-01
Payer: MEDICARE

## 2024-03-01 VITALS
HEIGHT: 63 IN | RESPIRATION RATE: 12 BRPM | BODY MASS INDEX: 30.65 KG/M2 | OXYGEN SATURATION: 97 % | HEART RATE: 60 BPM | DIASTOLIC BLOOD PRESSURE: 80 MMHG | WEIGHT: 173 LBS | TEMPERATURE: 98.1 F | SYSTOLIC BLOOD PRESSURE: 148 MMHG

## 2024-03-01 DIAGNOSIS — Z98.890 STATUS POST CAROTID ENDARTERECTOMY: Primary | ICD-10-CM

## 2024-03-01 DIAGNOSIS — E11.22 TYPE 2 DIABETES MELLITUS WITH STAGE 3A CHRONIC KIDNEY DISEASE, WITHOUT LONG-TERM CURRENT USE OF INSULIN (CMS/HCC): ICD-10-CM

## 2024-03-01 DIAGNOSIS — I10 PRIMARY HYPERTENSION: ICD-10-CM

## 2024-03-01 DIAGNOSIS — I65.23 CAROTID STENOSIS, BILATERAL: ICD-10-CM

## 2024-03-01 DIAGNOSIS — R59.0 CERVICAL ADENOPATHY: ICD-10-CM

## 2024-03-01 DIAGNOSIS — N18.31 TYPE 2 DIABETES MELLITUS WITH STAGE 3A CHRONIC KIDNEY DISEASE, WITHOUT LONG-TERM CURRENT USE OF INSULIN (CMS/HCC): ICD-10-CM

## 2024-03-01 DIAGNOSIS — E21.0 PRIMARY HYPERPARATHYROIDISM (CMS/HCC): ICD-10-CM

## 2024-03-01 DIAGNOSIS — E11.69 MIXED DIABETIC HYPERLIPIDEMIA ASSOCIATED WITH TYPE 2 DIABETES MELLITUS (CMS/HCC): ICD-10-CM

## 2024-03-01 DIAGNOSIS — I25.10 CORONARY ARTERY DISEASE INVOLVING NATIVE CORONARY ARTERY OF NATIVE HEART, UNSPECIFIED WHETHER ANGINA PRESENT: ICD-10-CM

## 2024-03-01 DIAGNOSIS — E78.2 MIXED DIABETIC HYPERLIPIDEMIA ASSOCIATED WITH TYPE 2 DIABETES MELLITUS (CMS/HCC): ICD-10-CM

## 2024-03-01 PROBLEM — Z92.89 H/O BEING HOSPITALIZED: Status: RESOLVED | Noted: 2023-12-07 | Resolved: 2024-03-01

## 2024-03-01 PROCEDURE — 99214 OFFICE O/P EST MOD 30 MIN: CPT | Performed by: INTERNAL MEDICINE

## 2024-03-01 PROCEDURE — G8753 SYS BP > OR = 140: HCPCS | Performed by: INTERNAL MEDICINE

## 2024-03-01 PROCEDURE — G8754 DIAS BP LESS 90: HCPCS | Performed by: INTERNAL MEDICINE

## 2024-03-01 RX ORDER — AMOXICILLIN 500 MG/1
500 TABLET, FILM COATED ORAL 3 TIMES DAILY
Qty: 21 TABLET | Refills: 0 | Status: SHIPPED | OUTPATIENT
Start: 2024-03-01 | End: 2024-03-08

## 2024-03-01 RX ORDER — PREGABALIN 75 MG/1
1 CAPSULE ORAL 2 TIMES DAILY
Status: ON HOLD | COMMUNITY
Start: 2024-02-27 | End: 2024-05-24 | Stop reason: ALTCHOICE

## 2024-03-01 NOTE — ASSESSMENT & PLAN NOTE
Blood pressure elevated today.  She was nervous coming in.  Did improve between first and second check continue losartan hydrochlorothiazide

## 2024-03-01 NOTE — ASSESSMENT & PLAN NOTE
Recent burning in throat after eating soup.  Having postnasal drip.  Has marked adenopathy.  Large submental salivary gland/node on the right and enlarged cervical anterior chain adenopathy on the left.  Treating with amoxicillin for 1 week we will have reevaluation 3 weeks

## 2024-03-01 NOTE — ASSESSMENT & PLAN NOTE
Blood sugars under excellent control A1c 5.7 continue follow-up with endocrinology continue Jardiance

## 2024-03-19 ENCOUNTER — TELEPHONE (OUTPATIENT)
Dept: PRIMARY CARE | Facility: CLINIC | Age: 79
End: 2024-03-19

## 2024-03-19 NOTE — TELEPHONE ENCOUNTER
Patient called today, she states that a very close famifly member has passed and she will not make her appointment on 3/25. The patient would like to know if Dr. Slaughter has any openings this week up until 3/21 or any time after 3/25. Please call her back.    [facility] Appointment Request   Provider: Sukhjinder  Appointment Type: ov  Reason for Visit: 3 week follow up  Available Day and Time: 3/20-3/21 or 3/26-3/29  Best Contact Number: [phone]    The practice will reach out to schedule your appointment within the next 2 business days.

## 2024-03-29 ENCOUNTER — OFFICE VISIT (OUTPATIENT)
Dept: PRIMARY CARE | Facility: CLINIC | Age: 79
End: 2024-03-29
Payer: MEDICARE

## 2024-03-29 VITALS
HEART RATE: 60 BPM | OXYGEN SATURATION: 98 % | BODY MASS INDEX: 31.18 KG/M2 | SYSTOLIC BLOOD PRESSURE: 148 MMHG | RESPIRATION RATE: 16 BRPM | TEMPERATURE: 98 F | DIASTOLIC BLOOD PRESSURE: 70 MMHG | HEIGHT: 63 IN | WEIGHT: 176 LBS

## 2024-03-29 DIAGNOSIS — R59.0 CERVICAL ADENOPATHY: Primary | ICD-10-CM

## 2024-03-29 DIAGNOSIS — J02.9 SORE THROAT: ICD-10-CM

## 2024-03-29 PROCEDURE — G8753 SYS BP > OR = 140: HCPCS | Performed by: INTERNAL MEDICINE

## 2024-03-29 PROCEDURE — G8754 DIAS BP LESS 90: HCPCS | Performed by: INTERNAL MEDICINE

## 2024-03-29 PROCEDURE — 99214 OFFICE O/P EST MOD 30 MIN: CPT | Performed by: INTERNAL MEDICINE

## 2024-03-29 NOTE — PROGRESS NOTES
Daily Progress Note      Subjective      Patient ID: Anita Gonzalez is a 79 y.o. female.  Chief Complaint   Patient presents with    Swollen Glands     The patient is here to recheck her swollen glands. She states that it took awhile to go away.     HPI      She is looking forward to this weekend.  Daughter taking her to [employer] in [employer] for [employer]  She is going to do some part-time work again for [employer].  They contacted her to do some record review for an ongoing study.  She will work 10 hours a week  Last saw her 4 weeks ago she had cervical adenopathy and burning sensation in her throat and a sore throat.    Throat still bothering her a bit  Still has to clear throat  Thinks stated after carotid surgery in December      Burnt throat with soup- 6 weeks ago  And has residual soreness since  Especially with anything acidic        Tried claritin and this seemed to decrease need to keep swallowing  Thinks lonase helps also  The following have been reviewed and updated as appropriate in this visit:        Review of Systems  Patient Active Problem List   Diagnosis    Hypertension    Primary hyperparathyroidism (CMS/HCC)    Type 2 diabetes mellitus with stage 3a chronic kidney disease, without long-term current use of insulin (CMS/HCC)    Cerebellar infarct (CMS/HCC)    CTS (carpal tunnel syndrome)    Diabetic peripheral neuropathy (CMS/HCC)    Recurrent falls    Meningoencephalitis    Chest pain    Insomnia    Mixed diabetic hyperlipidemia associated with type 2 diabetes mellitus (CMS/HCC)     CAD (coronary artery disease)    Low HDL (under 40)    LBBB (left bundle branch block)    Preop general physical exam    Osteopenia of multiple sites    Urinary frequency    Carotid stenosis, bilateral    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      atorvastatin (LIPITOR) 40 mg tablet Take 40 mg by mouth once daily.      cholecalciferol, vitamin D3, 1,000 unit (25 mcg) tablet Take 1,000 Units by mouth daily.      cinacalcet (SENSIPAR) 30 mg tablet Take 30 mg by mouth daily.      ezetimibe (ZETIA) 10 mg tablet Take 10 mg by mouth once daily.      hydrochlorothiazide (HYDRODIURIL) 12.5 mg tablet Take 12.5 mg by mouth once daily.      JARDIANCE 10 mg tablet Take 10 mg by mouth once daily.      losartan 100 mg tablet Take 100 mg by mouth daily.      medical marijuana 1 each See admin instr. Please be advised that an [facility] hospital staff member has listed medical marijuana as one of your home medications for documentation purposes. Please follow-up with your certified issuing provider for ongoing use      pregabalin (LYRICA) 75 mg capsule Take 1 capsule by mouth 2 (two) times a day.      VASCEPA 1 gram capsule Take 2 capsules by mouth 2 (two) times a day with meals.       No current facility-administered medications for this visit.     Past Medical History:   Diagnosis Date    COVID-19 09/24/2023    Hypertension     Type 2 diabetes mellitus (CMS/HCC)      No family history on file.  Past Surgical History:   Procedure Laterality Date    CARDIAC SURGERY  07/06/2021    Insertable Cardiac Monitor LINQ    CAROTID ENDARTERECTOMY Left 12/01/2023    CARPAL TUNNEL RELEASE Right     CARPAL TUNNEL RELEASE Left 01/2023    CORONARY STENT PLACEMENT  11/22/2021    HYSTERECTOMY      Partial    TONSILLECTOMY      WRIST SURGERY Right 09/15/2023     Social History     Socioeconomic History    Marital status:      Spouse name: Not on file    Number of children: Not on file    Years of education: Not on file    Highest  education level: Not on file   Occupational History    Not on file   Tobacco Use    Smoking status: Never    Smokeless tobacco: Never   Vaping Use    Vaping Use: Never used   Substance and Sexual Activity    Alcohol use: Yes     Comment: Rarely    Drug use: Not Currently    Sexual activity: Not Currently   Other Topics Concern    Not on file   Social History Narrative    Retired Senior Clinical  rn, BSN,  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 [employer]    Lives with dtr, son in law , and grandson     Social Determinants of Health     Financial Resource Strain: Not on file   Food Insecurity: Not on file   Transportation Needs: Not on file   Physical Activity: Not on file   Stress: Not on file   Social Connections: Not on file   Intimate Partner Violence: Not on file   Housing Stability: Not on file     No Known Allergies  Vitals:    03/29/24 0823   BP: (!) 148/70   Pulse: 60   Resp: 16   Temp: 36.7 °C (98 °F)   SpO2: 98%     Objective     Results for orders placed or performed in visit on 12/07/23   CBC and Differential   Result Value Ref Range    White Blood Cell Count 7.3 3.8 - 10.8 Thousand/uL    Red Blood Cell Count 4.35 3.80 - 5.10 Million/uL    Hemoglobin 12.8 11.7 - 15.5 g/dL    HEMATOCRIT 38.4 35.0 - 45.0 %    Mcv 88.3 80.0 - 100.0 fL    Mch 29.4 27.0 - 33.0 pg    Mchc 33.3 32.0 - 36.0 g/dL    Rdw 12.8 11.0 - 15.0 %    Platelet Count 209 140 - 400 Thousand/uL    Mpv 10.6 7.5 - 12.5 fL    Absolute Neutrophils 4,606 1,500 - 7,800 cells/uL    Absolute Lymphocytes 1,555 850 - 3,900 cells/uL    Absolute Monocytes 657 200 - 950 cells/uL    Absolute Eosinophils 431 15 - 500 cells/uL    Absolute Basophils 51 0 - 200 cells/uL    Neutrophils 63.1 %    Lymphocytes 21.3 %    Monocytes 9.0 %    Eosinophils 5.9 %    Basophils 0.7 %   Magnesium   Result Value Ref Range    Magnesium 1.9 1.5 - 2.5 mg/dL     Vitals:    03/29/24 0823   BP: (!) 148/70   Pulse: 60   Resp: 16   Temp: 36.7 °C (98 °F)   SpO2:  98%     Vital signs reviewed  Physical Exam  Constitutional:       Appearance: She is well-developed.   HENT:      Head: Normocephalic and atraumatic.      Mouth/Throat:      Mouth: Mucous membranes are moist.      Tongue: No lesions.      Pharynx: No oropharyngeal exudate, posterior oropharyngeal erythema or uvula swelling.   Eyes:      Conjunctiva/sclera: Conjunctivae normal.   Cardiovascular:      Rate and Rhythm: Normal rate and regular rhythm.      Heart sounds: Normal heart sounds.   Pulmonary:      Effort: Pulmonary effort is normal. No respiratory distress.      Breath sounds: Normal breath sounds. No wheezing.   Abdominal:      General: Bowel sounds are normal.      Palpations: Abdomen is soft.   Musculoskeletal:         General: No deformity.      Cervical back: Normal range of motion and neck supple.      Right lower leg: No edema.      Left lower leg: No edema.   Lymphadenopathy:      Cervical: Cervical adenopathy present.      Right cervical: Superficial cervical adenopathy present.      Comments: Enlarged gland right submandibular left submental location right submandibular gland is larger than the left submental compared to 4 weeks ago the glands are smaller but still prominent.   Skin:     General: Skin is warm and dry.   Neurological:      Mental Status: She is alert and oriented to person, place, and time.   Psychiatric:         Behavior: Behavior normal.             Chemistry        Component Value Date/Time     03/23/2023 0929    K 4.3 03/23/2023 0929     03/23/2023 0929    CO2 29 03/23/2023 0929    BUN 20 03/23/2023 0929    CREATININE 1.05 (H) 03/23/2023 0929        Component Value Date/Time    CALCIUM 9.7 03/23/2023 0929    ALKPHOS 51 03/23/2023 0929    AST 17 03/23/2023 0929    ALT 17 03/23/2023 0929    BILITOT 0.6 03/23/2023 0929          Lab Results   Component Value Date    HGBA1C 5.9 (H) 03/23/2023     Lab Results   Component Value Date    TSH 1.56 10/26/2021     Lab Results    Component Value Date    WBC 7.3 12/08/2023    HGB 12.8 12/08/2023    HCT 38.4 12/08/2023    MCV 88.3 12/08/2023     12/08/2023       Lab Results   Component Value Date    LDLCALC 56 03/23/2023       Assessment and Plan   Cervical adenopathy  Persistent swelling especially in the right submandibular left submental locations.  I wonder if this is salivary gland enlargement.  She has concurrent sore throat that has been persistent I would like her to see ENT.  Will pursue ultrasound of the neck for further evaluation as well      Orders Placed This Encounter   Procedures    ULTRASOUND SOFT TISSUE HEAD/FACE/NECK     Standing Status:   Future     Standing Expiration Date:   3/29/2025     Order Specific Question:   Release to patient     Answer:   Immediate [1]    Ambulatory referral to ENT     Standing Status:   Future     Standing Expiration Date:   9/29/2024     Referral Priority:   Routine     Referral Type:   Consultation     Referral Reason:   Specialty Services Required     Referred to Provider:   Ervin Luciano MD     Number of Visits Requested:   10         This note was created using speech recognition software. Any errors are unintentional. If you have any questions or need clarification please call.  Catarino Slaughter MD  3/29/2024

## 2024-03-29 NOTE — ASSESSMENT & PLAN NOTE
Persistent swelling especially in the right submandibular left submental locations.  I wonder if this is salivary gland enlargement.  She has concurrent sore throat that has been persistent I would like her to see ENT.  Will pursue ultrasound of the neck for further evaluation as well

## 2024-03-29 NOTE — ASSESSMENT & PLAN NOTE
Persistent sore throat that is going on for months now.  Has adenopathy or salivary gland enlargement in the neck.  Will image with ultrasound would like her to see ENT.

## 2024-04-15 DIAGNOSIS — R59.0 CERVICAL ADENOPATHY: ICD-10-CM

## 2024-04-15 DIAGNOSIS — J02.9 SORE THROAT: ICD-10-CM

## 2024-04-16 ENCOUNTER — TELEPHONE (OUTPATIENT)
Dept: PRIMARY CARE | Facility: CLINIC | Age: 79
End: 2024-04-16
Payer: MEDICARE

## 2024-04-16 NOTE — TELEPHONE ENCOUNTER
----- Message from Catarino Slaughter MD sent at 4/15/2024  9:55 PM EDT -----  Ultrasound reassuring  Slightly enlarged lymph node on left  Would like her to see Ent as we discussed  Spoke with patient. She has an appointment with Dr. Luciano on Friday.

## 2024-04-16 NOTE — RESULT ENCOUNTER NOTE
Ultrasound reassuring  Slightly enlarged lymph node on left  Would like her to see Ent as we discussed

## 2024-05-01 ENCOUNTER — TRANSCRIBE ORDERS (OUTPATIENT)
Dept: SCHEDULING | Age: 79
End: 2024-05-01

## 2024-05-01 DIAGNOSIS — C01 MALIGNANT NEOPLASM OF BASE OF TONGUE (CMS/HCC): Primary | ICD-10-CM

## 2024-05-10 ENCOUNTER — TELEPHONE (OUTPATIENT)
Dept: PRIMARY CARE | Facility: CLINIC | Age: 79
End: 2024-05-10
Payer: MEDICARE

## 2024-05-10 NOTE — TELEPHONE ENCOUNTER
I spoke to patient and she states Dr. Luciano did not receive the results of the CT scan she had done yesterday at [facility]. Can you please fax those to Dr. Luciano's office for patient? I pulled them into her chart through [facility], I just cannot sent it. Thank you!

## 2024-05-20 ENCOUNTER — PRE-ADMISSION TESTING (OUTPATIENT)
Dept: PREADMISSION TESTING | Age: 79
End: 2024-05-20
Payer: MEDICARE

## 2024-05-20 VITALS — WEIGHT: 173 LBS | BODY MASS INDEX: 30.65 KG/M2 | HEIGHT: 63 IN

## 2024-05-20 NOTE — PRE-PROCEDURE INSTRUCTIONS
1.       We will call you between 3 pm and 7 pm on May 23, 2024 to inform you of arrival time for your procedure. If you do not hear by 6PM, please call [phone] for arrival time.     2.        Please arrive through the Main Entrance of the [facility] (across from the parking garage) and report to the Surgery Registration Desk on the day of your procedure.    3. Please follow the following fasting guidelines:     No solid food EIGHT HOURS prior to arrival time on day of surgery.  6 ounces of clear liquids, meaning water or PLAIN black coffee WITHOUT any milk, cream, sugar, or sweetener are permitted up to TWO HOURS prior to arrival at the hospital.    4. Please take ONLY the following medications with a sip of water on the morning of your procedure: Atorvastatin, Cinacalcet, ezetimibe.  STOP JARDIANCE 3 DAYS PRIOR TO SX.  Follow Dr. Herrera's reccomendation for ASPIRIN.  No NSAIDS (Motrin, ibuprofen, ASA etc.) for 7 days prior to surgery date. No vitamins or supplements 7 days prior to surgery date.   Tylenol is permitted.      5. Other Instructions: You may brush your teeth the morning of the procedure. Rinse and spit, do not swallow.  Bring a list of your medications with dosages.  Use surgical wash as directed.     6. If you develop a cold, cough, fever, rash, or other symptom prior to the day of the procedure, please report it to your physician immediately.    7. If you need to cancel the procedure for any reason, please contact your physician.    8. Make arrangements to have safe transportation home accompanied by a responsible adult. If you have not arranged safe transportation home, your surgery will be cancelled. Safe transportation may include private vehicle, ride-share service, taxi and public transportation when accompanied by a responsible adult who will assist you home. A responsible adult is someone known to you and does not include the taxi, ride-share or public transit drive transporting you.    9.   If it is medically necessary for you to have a longer stay, you will be informed as soon as the decision is made.    10. Only bring essential items to the hospital.  Do not wear or bring anything of value to the hospital including jewelry of any kind, money, or wallet. Do not wear make-up or contact lenses.  DO NOT BRING MEDICATIONS FROM HOME unless instructed to do so. DO bring your hearing aids, glasses, and a case    11. No lotion, creams, powders, or oils on skin the morning of procedure     12. Dress in comfortable clothes.    13.  If instructed, please bring a copy of your Advanced Directive (Living Will/Durable Power of ) on the day of your procedure.     14. Ensuring your safety at all times is a very important part of our [facility] Culture of Safety. After having surgery and sedation, you are at risk for falling and balance issues. Although you may feel awake, the effects of the medication can last up to 24 hours after anesthesia. If you need to use the bathroom during your recovery period, nursing staff will escort you there and stay with you to ensure your safety.    15. Refrain from drinking alcohol and smoking cigarettes for 24 hours prior to surgery.    16. Shower with antibacterial soap (Dial) the night before and morning of your procedure.  If your procedure indicates the need for CHG antiseptic wash (Bactoshield or Hibiclens), please use this instead and follow instructions as discussed at the time of your Pre-Admission Testing phone interview or visit.    Above instructions reviewed with patient and patient acknowledges understanding.    Form explained by: Kristin White RN

## 2024-05-23 ENCOUNTER — ANESTHESIA EVENT (OUTPATIENT)
Dept: OPERATING ROOM | Facility: HOSPITAL | Age: 79
Setting detail: HOSPITAL OUTPATIENT SURGERY
End: 2024-05-23
Payer: MEDICARE

## 2024-05-24 ENCOUNTER — ANESTHESIA (OUTPATIENT)
Dept: OPERATING ROOM | Facility: HOSPITAL | Age: 79
Setting detail: HOSPITAL OUTPATIENT SURGERY
End: 2024-05-24
Payer: MEDICARE

## 2024-05-24 ENCOUNTER — HOSPITAL ENCOUNTER (OUTPATIENT)
Facility: HOSPITAL | Age: 79
Setting detail: HOSPITAL OUTPATIENT SURGERY
Discharge: HOME | End: 2024-05-24
Attending: OTOLARYNGOLOGY | Admitting: OTOLARYNGOLOGY
Payer: MEDICARE

## 2024-05-24 VITALS
HEIGHT: 63 IN | RESPIRATION RATE: 16 BRPM | TEMPERATURE: 98 F | DIASTOLIC BLOOD PRESSURE: 63 MMHG | BODY MASS INDEX: 30.12 KG/M2 | SYSTOLIC BLOOD PRESSURE: 148 MMHG | OXYGEN SATURATION: 96 % | WEIGHT: 170 LBS | HEART RATE: 55 BPM

## 2024-05-24 DIAGNOSIS — D37.02 NEOPLASM OF UNCERTAIN BEHAVIOR OF BASE OF TONGUE: ICD-10-CM

## 2024-05-24 LAB
GLUCOSE BLD-MCNC: 113 MG/DL (ref 70–99)
POCT TEST: ABNORMAL

## 2024-05-24 PROCEDURE — 71000012 HC PACU PHASE 2 EA ADDL MIN: Performed by: OTOLARYNGOLOGY

## 2024-05-24 PROCEDURE — 0CBM8ZX EXCISION OF PHARYNX, VIA NATURAL OR ARTIFICIAL OPENING ENDOSCOPIC, DIAGNOSTIC: ICD-10-PCS | Performed by: OTOLARYNGOLOGY

## 2024-05-24 PROCEDURE — 63600000 HC DRUGS/DETAIL CODE: Mod: JZ | Performed by: NURSE ANESTHETIST, CERTIFIED REGISTERED

## 2024-05-24 PROCEDURE — 25000000 HC PHARMACY GENERAL: Performed by: OTOLARYNGOLOGY

## 2024-05-24 PROCEDURE — 71000011 HC PACU PHASE 1 EA ADDL MIN: Performed by: OTOLARYNGOLOGY

## 2024-05-24 PROCEDURE — 36000002 HC OR LEVEL 2 INITIAL 30MIN: Performed by: OTOLARYNGOLOGY

## 2024-05-24 PROCEDURE — 88341 IMHCHEM/IMCYTCHM EA ADD ANTB: CPT | Mod: 59 | Performed by: OTOLARYNGOLOGY

## 2024-05-24 PROCEDURE — 25800000 HC PHARMACY IV SOLUTIONS: Performed by: ANESTHESIOLOGY

## 2024-05-24 PROCEDURE — 71000001 HC PACU PHASE 1 INITIAL 30MIN: Performed by: OTOLARYNGOLOGY

## 2024-05-24 PROCEDURE — 37000001 HC ANESTHESIA GENERAL: Performed by: OTOLARYNGOLOGY

## 2024-05-24 PROCEDURE — 25000000 HC PHARMACY GENERAL: Performed by: NURSE ANESTHETIST, CERTIFIED REGISTERED

## 2024-05-24 PROCEDURE — 71000002 HC PACU PHASE 2 INITIAL 30MIN: Performed by: OTOLARYNGOLOGY

## 2024-05-24 RX ORDER — OXYCODONE HYDROCHLORIDE 5 MG/1
5 TABLET ORAL EVERY 4 HOURS PRN
Status: DISCONTINUED | OUTPATIENT
Start: 2024-05-24 | End: 2024-05-24 | Stop reason: HOSPADM

## 2024-05-24 RX ORDER — LIDOCAINE HYDROCHLORIDE 10 MG/ML
INJECTION, SOLUTION EPIDURAL; INFILTRATION; INTRACAUDAL; PERINEURAL AS NEEDED
Status: DISCONTINUED | OUTPATIENT
Start: 2024-05-24 | End: 2024-05-24 | Stop reason: SURG

## 2024-05-24 RX ORDER — ONDANSETRON HYDROCHLORIDE 2 MG/ML
INJECTION, SOLUTION INTRAVENOUS AS NEEDED
Status: DISCONTINUED | OUTPATIENT
Start: 2024-05-24 | End: 2024-05-24 | Stop reason: SURG

## 2024-05-24 RX ORDER — DEXAMETHASONE SODIUM PHOSPHATE 4 MG/ML
INJECTION, SOLUTION INTRA-ARTICULAR; INTRALESIONAL; INTRAMUSCULAR; INTRAVENOUS; SOFT TISSUE AS NEEDED
Status: DISCONTINUED | OUTPATIENT
Start: 2024-05-24 | End: 2024-05-24 | Stop reason: SURG

## 2024-05-24 RX ORDER — PROPOFOL 10 MG/ML
INJECTION, EMULSION INTRAVENOUS AS NEEDED
Status: DISCONTINUED | OUTPATIENT
Start: 2024-05-24 | End: 2024-05-24 | Stop reason: SURG

## 2024-05-24 RX ORDER — MIDAZOLAM HYDROCHLORIDE 2 MG/2ML
INJECTION, SOLUTION INTRAMUSCULAR; INTRAVENOUS AS NEEDED
Status: DISCONTINUED | OUTPATIENT
Start: 2024-05-24 | End: 2024-05-24 | Stop reason: SURG

## 2024-05-24 RX ORDER — FENTANYL CITRATE 50 UG/ML
INJECTION, SOLUTION INTRAMUSCULAR; INTRAVENOUS AS NEEDED
Status: DISCONTINUED | OUTPATIENT
Start: 2024-05-24 | End: 2024-05-24 | Stop reason: SURG

## 2024-05-24 RX ORDER — ROCURONIUM BROMIDE 50 MG/5 ML
SYRINGE (ML) INTRAVENOUS AS NEEDED
Status: DISCONTINUED | OUTPATIENT
Start: 2024-05-24 | End: 2024-05-24 | Stop reason: SURG

## 2024-05-24 RX ORDER — COCAINE HYDROCHLORIDE 40 MG/ML
SOLUTION NASAL
Status: DISCONTINUED | OUTPATIENT
Start: 2024-05-24 | End: 2024-05-24 | Stop reason: HOSPADM

## 2024-05-24 RX ORDER — MORPHINE SULFATE 4 MG/ML
2 INJECTION, SOLUTION INTRAMUSCULAR; INTRAVENOUS EVERY 4 HOURS PRN
Status: DISCONTINUED | OUTPATIENT
Start: 2024-05-24 | End: 2024-05-24 | Stop reason: HOSPADM

## 2024-05-24 RX ORDER — SODIUM CHLORIDE 9 MG/ML
INJECTION, SOLUTION INTRAVENOUS CONTINUOUS
Status: DISCONTINUED | OUTPATIENT
Start: 2024-05-24 | End: 2024-05-24 | Stop reason: HOSPADM

## 2024-05-24 RX ORDER — ACETAMINOPHEN 325 MG/1
650 TABLET ORAL 4 TIMES DAILY PRN
Status: DISCONTINUED | OUTPATIENT
Start: 2024-05-24 | End: 2024-05-24 | Stop reason: HOSPADM

## 2024-05-24 RX ORDER — ONDANSETRON HYDROCHLORIDE 2 MG/ML
4 INJECTION, SOLUTION INTRAVENOUS EVERY 6 HOURS PRN
Status: DISCONTINUED | OUTPATIENT
Start: 2024-05-24 | End: 2024-05-24 | Stop reason: HOSPADM

## 2024-05-24 RX ADMIN — SUGAMMADEX 200 MG: 100 INJECTION, SOLUTION INTRAVENOUS at 11:41

## 2024-05-24 RX ADMIN — SODIUM CHLORIDE: 9 INJECTION, SOLUTION INTRAVENOUS at 10:40

## 2024-05-24 RX ADMIN — MIDAZOLAM 2 MG: 1 INJECTION INTRAMUSCULAR; INTRAVENOUS at 11:23

## 2024-05-24 RX ADMIN — Medication 50 MG: at 11:30

## 2024-05-24 RX ADMIN — DEXAMETHASONE SODIUM PHOSPHATE 4 MG: 4 INJECTION, SOLUTION INTRA-ARTICULAR; INTRALESIONAL; INTRAMUSCULAR; INTRAVENOUS; SOFT TISSUE at 11:37

## 2024-05-24 RX ADMIN — LIDOCAINE HYDROCHLORIDE 5 ML: 10 INJECTION, SOLUTION EPIDURAL; INFILTRATION; INTRACAUDAL; PERINEURAL at 11:30

## 2024-05-24 RX ADMIN — FENTANYL CITRATE 50 MCG: 50 INJECTION, SOLUTION INTRAMUSCULAR; INTRAVENOUS at 11:28

## 2024-05-24 RX ADMIN — ONDANSETRON 4 MG: 2 INJECTION INTRAMUSCULAR; INTRAVENOUS at 11:41

## 2024-05-24 RX ADMIN — PROPOFOL 200 MG: 10 INJECTION, EMULSION INTRAVENOUS at 11:30

## 2024-05-24 ASSESSMENT — PAIN SCALES - GENERAL: PAIN_LEVEL: 0

## 2024-05-24 ASSESSMENT — ENCOUNTER SYMPTOMS: DYSRHYTHMIAS: 1

## 2024-05-24 NOTE — PERIOPERATIVE NURSING NOTE
Anesthesiologist at bedside aware of patient's BP of 175/73 , accucheck 113 and last dose of baby aspirin this morning

## 2024-05-24 NOTE — OP NOTE
Patient Name:  Anita Gonzalez  Medical Record Number:  [id number]  YOB: 1945   Date of Operation:  5/24/2024  Primary Surgeon:  Ervin Luciano MD  Anesthesia: General  Anesthesiologist: Anesthesiologist: Pavel Diez MD  CRNA: Jeni Humphries CRNA     Preoperative Diagnosis:    Vocal Cord Paralysis    Postoperative Diagnosis:   Same    Operation:  Microlaryngoscopy and Biopsy of tongue base    Indication: Anita Gonzalez is a 79 y.o. female with tongue base mass    Procedure   The patient was brought to the operating room and identified. She was given general anesthesia, prepped and draped in a sterile fashion. The DEDO laryngoscope was used to visualize the larynx. The microscope was used for closer visualization. The vocal cords and supraglottic larynx were normal. The scope was then withdrawn to the tongue base. ?There was an exophytic mass extending from the midportion of the tongue base and ulceration medially with bleeding. Punch forceps were used to take biopsies of the soft tissue. Cocaine soaked pledgets were used for hemostasis. She was then awoken from her general anesthesia and returned to the recovery room in stable condition.    Estimated Blood Loss: No blood loss documented.    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

## 2024-05-24 NOTE — ANESTHESIOLOGIST PRE-PROCEDURE ATTESTATION
Pre-Procedure Patient Identification:  I am the Primary Anesthesiologist and have identified the patient on 05/24/24 at 10:06 AM.   I have confirmed the procedure(s) will be performed by the following surgeon/proceduralist Ervin Luciano MD.

## 2024-05-24 NOTE — ANESTHESIA PROCEDURE NOTES
Airway  Urgency: elective    Start Time: 5/24/2024 11:31 AM  Airway not difficult    General Information and Staff    Patient location during procedure: OR  Anesthesiologist: Pavel Diez MD  Resident/CRNA: Jeni Humphries CRNA  Performed: resident/CRNA   Performed by: Jeni Humphries CRNA  Authorized by: Pavel Diez MD      Indications and Patient Condition  Indications for airway management: anesthesia  Sedation level: deep  Preoxygenated: yes  Patient position: sniffing  MILS not maintained throughout  Mask difficulty assessment: 1 - vent by mask    Final Airway Details  Final airway type: endotracheal airway      Successful airway: ETT  Cuffed: yes   Successful intubation technique: video laryngoscopy  Facilitating devices/methods: intubating stylet  Endotracheal tube insertion site: oral  Blade: Roman  Blade size: #3  ETT size (mm): 6.5  Cormack-Lehane Classification: grade I - full view of glottis  Placement verified by: chest auscultation and capnometry   Measured from: lips  ETT to lips (cm): 22  Number of attempts at approach: 1  Number of other approaches attempted: 0  Atraumatic airway insertion

## 2024-05-24 NOTE — H&P
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    HPI     Patient is a 79 y.o. female with chronic throat discomfort and mildly swollen lymph node for 6 months. ? Base of tongue mass on exam. Planning laryngoscopy and biopsy. Discussed risks    Medical History:   Past Medical History:   Diagnosis Date    Coronary artery disease     COVID-19 09/24/2023    Hyperparathyroidism (CMS/HCC)     Hypertension     Implantable loop recorder present 07/06/2021    LBBB (left bundle branch block)     Obesity (BMI 30.0-34.9)     Recurrent falls     Stroke (CMS/HCC)     Type 2 diabetes mellitus (CMS/HCC)        Surgical History:   Past Surgical History:   Procedure Laterality Date    CAROTID ENDARTERECTOMY Left 12/01/2023    CARPAL TUNNEL RELEASE Right     CARPAL TUNNEL RELEASE Left 01/2023    CORONARY STENT PLACEMENT  11/22/2021    HYSTERECTOMY      Partial    TONSILLECTOMY      WRIST SURGERY Right 09/15/2023       Social History:   Social History     Social History Narrative    Retired Senior Clinical  rn, BSN,  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 [employer]    Lives with dtr, son in law , and grandson       Family History: History reviewed. No pertinent family history.    Allergies: Patient has no known allergies.    Home Medications:    aspirin, Take 81 mg by mouth daily.    atorvastatin, Take 40 mg by mouth once daily.    cinacalcet, Take 30 mg by mouth daily.    ezetimibe, Take 10 mg by mouth once daily.    hydrochlorothiazide, Take 12.5 mg by mouth once daily.    losartan, Take 100 mg by mouth daily.    VASCEPA, Take 2 capsules by mouth 2 (two) times a day with meals.    cholecalciferol (vitamin D3), Take 1,000 Units by mouth daily.    JARDIANCE, Take 10 mg by mouth once daily.    medical marijuana, 1 each See admin instr. Please be advised that an [facility] hospital staff member has listed medical marijuana as one of your home medications for documentation  "purposes. Please follow-up with your certified issuing provider for ongoing use    Current Medications:    sodium chloride 0.9 %, , intravenous, Continuous    Review of Systems   Constitutional: Negative for appetite change.   HENT: Negative for facial swelling.    Eyes: Negative for discharge.   Respiratory: Negative for stridor.    Cardiovascular: Negative for chest pain.   Gastrointestinal: Negative for diarrhea.   Genitourinary: Negative for difficulty urinating.   Skin: Negative for rash.   Neurological: Negative for speech difficulty.   Psychiatric/Behavioral: Negative for hallucinations.     Objective     Vital Signs for the last 24 hours:  Temp:  [36.7 °C (98.1 °F)] 36.7 °C (98.1 °F)  Heart Rate:  [62] 62  Resp:  [16] 16  BP: (175)/(73) 175/73    Physical Exam:  Visit Vitals  BP (!) 175/73   Pulse 62   Temp 36.7 °C (98.1 °F) (Temporal)   Resp 16   Ht 1.6 m (5' 3\")   Wt 77.1 kg (170 lb)   SpO2 99%   BMI 30.11 kg/m²       General appearance: alert, appears stated age and cooperative  Head: normocephalic, without obvious abnormality, atraumatic  Ears: normal TM's and external ear canals both ears  Nose: External nose with no gross deformity. Nares normal. No nasal septal hematoma or perforation. Mucosa normal. No drainage or sinus tenderness.  Throat: lips, mucosa, and tongue normal; teeth and gums normal  Neck: no adenopathy, no carotid bruit, no JVD, supple, symmetrical, trachea midline and thyroid not enlarged, symmetric, no tenderness/mass/nodules  Lungs: no wheezing or stridor  Extremities: extremities normal, warm and well-perfused; no cyanosis, clubbing, or edema  Skin: Skin color, texture, turgor normal. No rashes or lesions  Lymph nodes: No Cervical Lymphadenopathy  Neurologic: Grossly normal        Assessment/Plan   Patient is a 79 y.o. female with chronic throat discomfort and mildly swollen lymph node for 6 months. ? Base of tongue mass on exam. Planning laryngoscopy and biopsy. Discussed risks     "

## 2024-05-24 NOTE — ANESTHESIA PREPROCEDURE EVALUATION
Relevant Problems   CARDIOVASCULAR   (+) CAD (coronary artery disease)   (+) Carotid stenosis, bilateral   (+) Hypertension   (+) LBBB (left bundle branch block)      NEUROLOGY   (+) CTS (carpal tunnel syndrome)   (+) Diabetic peripheral neuropathy (CMS/HCC)      URINARY SYSTEM   (+) Hypomagnesemia      Other   (+) Type 2 diabetes mellitus with stage 3a chronic kidney disease, without long-term current use of insulin (CMS/HCC)       Anesthesia ROS/MED HX    Anesthesia History - neg  Pulmonary - neg  Neuro/Psych    CVA  Cardiovascular   CAD   hypertension  Dysrhythmias   Echocardiogram reviewed and ECG reviewed  Hematological - neg  GI/Hepatic- neg  Musculoskeletal- neg  Endo/Other   Diabetes       HTN  CAD  DMII  CVA    TTE 7/21/23    A complete transthoracic echocardiogram (including 2D, color flow   Doppler, spectral Doppler, M-mode and strain imaging) was performed using   the standard protocol. The study quality was diagnostic for the question   posed.    The left ventricle is normal in size. LVIDD measures 5.1 cm. Top normal   left ventricular wall thickness. There are no segmental wall motion   abnormalities. Normal left ventricular ejection fraction. The left   ventricular ejection fraction by visual estimate is 55% to 60%. The   average global longitudinal peak systolic strain was assessed but found to   be technically limited in nature.     There is indeterminate diastolic function. There is an impaired   relaxation mitral inflow pattern. There is reduced tissue Doppler velocity   of the lateral and septal mitral valve annulus. E/A ratio is 0.49. LV   average E/e' ratio is 10.0.     The right ventricle is normal in size. There is normal function of the   right ventricle.     The left atrium is normal in size.     The right atrium is normal in size.     There is mild mitral valve leaflet thickening. There is mild mitral   annular calcification. There is trace mitral regurgitation. There is no   mitral  stenosis.     The aortic valve is trileaflet. There is trivial aortic valve   thickening. There is normal excursion of the aortic valve.  There is no   aortic stenosis. There is trace aortic regurgitation.     The tricuspid valve is normal in structure. There is trace tricuspid   regurgitation. Pulmonary artery systolic pressure measures 29 mmHg. The   pulmonary artery systolic pressure is normal.     The proximal segment of the ascending aorta is mildly enlarged. The   proximal segment of the ascending aorta measures 3.8 cm.     The inferior vena cava is normal in size (diameter <21 mm) and   decreases >50% in size with inspiration, suggesting a normal right atrial   pressure of 3 mmHg (range 0-5 mm Hg).     Compared to the prior study of 1/19/2022, there are no significant   changes.     Allergies: No Known Allergies    ROS: Denies any chest pain or shortness of breath    PMH:   Past Medical History:   Diagnosis Date    Coronary artery disease     COVID-19 09/24/2023    Hyperparathyroidism (CMS/HCC)     Hypertension     Implantable loop recorder present 07/06/2021    LBBB (left bundle branch block)     Obesity (BMI 30.0-34.9)     Recurrent falls     Stroke (CMS/HCC)     Type 2 diabetes mellitus (CMS/HCC)        PSH:   Past Surgical History:   Procedure Laterality Date    CAROTID ENDARTERECTOMY Left 12/01/2023    CARPAL TUNNEL RELEASE Right     CARPAL TUNNEL RELEASE Left 01/2023    CORONARY STENT PLACEMENT  11/22/2021    HYSTERECTOMY      Partial    TONSILLECTOMY      WRIST SURGERY Right 09/15/2023       No current facility-administered medications on file prior to encounter.     Current Outpatient Medications on File Prior to Encounter   Medication Sig    aspirin 81 mg enteric coated tablet Take 81 mg by mouth daily.    atorvastatin (LIPITOR) 40 mg tablet Take 40 mg by mouth once daily.    cinacalcet (SENSIPAR) 30 mg tablet Take 30 mg by mouth daily.    ezetimibe (ZETIA) 10 mg tablet Take 10 mg by mouth once  daily.    hydrochlorothiazide (HYDRODIURIL) 12.5 mg tablet Take 12.5 mg by mouth once daily.    losartan 100 mg tablet Take 100 mg by mouth daily.    VASCEPA 1 gram capsule Take 2 capsules by mouth 2 (two) times a day with meals.    cholecalciferol, vitamin D3, 1,000 unit (25 mcg) tablet Take 1,000 Units by mouth daily.    JARDIANCE 10 mg tablet Take 10 mg by mouth once daily.    medical marijuana 1 each See admin instr. Please be advised that an [facility] hospital staff member has listed medical marijuana as one of your home medications for documentation purposes. Please follow-up with your certified issuing provider for ongoing use    [DISCONTINUED] pregabalin (LYRICA) 75 mg capsule Take 1 capsule by mouth 2 (two) times a day.       CBC Results         12/08/23 10/26/21     1100 1143    WBC 7.3 7.0    RBC 4.35 4.30    HGB 12.8 12.5    HCT 38.4 37.8    MCV 88.3 87.9    MCH 29.4 29.1    MCHC 33.3 33.1     204          BMP Results         03/23/23 10/17/22 10/26/21     0929  1143     -- 139    K 4.3 -- 4.3    Cl 105 -- 102    CO2 29 -- 28    Glucose 100 -- 134    BUN 20 -- 20    Creatinine 1.05 -- 1.03    Calcium 9.7 -- 10.1    EGFR 55 54.0 53       62           Comment for Glucose at 0929 on 03/23/23:               Fasting reference interval     For someone without known diabetes, a glucose value  between 100 and 125 mg/dL is consistent with  prediabetes and should be confirmed with a  follow-up test.         Comment for Glucose at 1143 on 10/26/21:               Fasting reference interval     For someone without known diabetes, a glucose  value >125 mg/dL indicates that they may have  diabetes and this should be confirmed with a  follow-up test.         Comment for Creatinine at 1143 on 10/26/21: For patients >49 years of age, the reference limit  for Creatinine is approximately 13% higher for people  identified as -American.         Comment for EGFR at 0929 on 03/23/23: The eGFR is based on the  CKD-EPI 2021 equation. To calculate   the new eGFR from a previous Creatinine or Cystatin C  result, go to https://www.kidney.org/professionals/  kdoqi/gfr%5Fcalculator                        Physical Exam    Airway   Mallampati: II   TM distance: >3 FB   Neck ROM: full  Cardiovascular - normal   Rhythm: regular   Rate: normalPulmonary - normal   clear to auscultation  Dental - normal        Anesthesia Plan    Plan: general    Technique: general endotracheal      Airway: direct visual laryngoscopy    3 ASA  Anesthetic plan and risks discussed with: patient  Postop Plan:   Patient Disposition: phase II then home  Comments:    Plan: GETA

## 2024-05-24 NOTE — DISCHARGE INSTRUCTIONS
Activity:     Please sleep with your head elevated or in a recliner for 2 days. This will help limit bleeding and swelling. It is ok to slowly resume your regular level of activity slowly once the bleeding has stopped, but it is generally best to avoid strenuous activity, heavy lifting or sports for 2 weeks. Please avoid air travel, swimming under water, and scuba diving for at least 1 week after surgery.     Diet:  It is ok to resume your regular diet post operatively. Patients are often nauseous after anesthesia, and it is best to start with bland foods and clear liquids until your stomach is back to normal. Hot foods or steamy foods may stimulate bleeding from your tongue and they should be avoided for 1 or 2 days.      Medications:              OK to take Tylenol or Ibuprofen as needed for Pain. Please call Dr. Luciano if a stronger pain medication is necessary.    To be expected:     Appointment:   You will need to be seen in the office in one week. Please call the office to confirm this appointment. [phone]    Questions: For life threatening emergencies please call 911, or proceed to the emergency room. If you need to speak with a doctor or have an urgent question, please call the office # [phone]. There is a doctor on call 24 hours to assist you. My cell phone # is . Please do not hesitate to call or text me with any questions or concerns.

## 2024-05-24 NOTE — ANESTHESIA POSTPROCEDURE EVALUATION
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])    Surgeons: Ervin Luciano MD Responsible Provider: Pavel Diez MD    Anesthesia Type: general ASA Status: 3            Anesthesia Type: general  PACU Vitals  5/24/2024 1153 - 5/24/2024 1253        5/24/2024  1159 5/24/2024  1201 5/24/2024  1215 5/24/2024  1230    BP: 137/62 96/55 112/56 128/60    Temp: 36.1 °C (97 °F) -- -- --    Pulse: 56 56 66 54    Resp: 18 12 16 12    SpO2: 97 % 96 % -- --                5/24/2024  1245 5/24/2024  1252          BP: 134/63 148/63      Temp: 36.6 °C (97.8 °F) 36.7 °C (98 °F)      Pulse: 54 55      Resp: 12 16      SpO2: -- --                Anesthesia Post Evaluation    Pain score: 0  Pain management: adequate  Mode of pain management: IV medication  Patient location during evaluation: PACU  Patient participation: complete - patient participated  Level of consciousness: awake and alert  Cardiovascular status: acceptable  Airway Patency: adequate  Respiratory status: acceptable  Hydration status: acceptable  Anesthetic complications: no

## 2024-05-24 NOTE — OR SURGEON
Pre-Procedure patient identification:  I am the primary operating surgeon/proceduralist and I have reviewed the applicable pathology reports and radiology studies for this procedure. I have identified the patient on 05/24/24 at 11:01 AM Ervin Luciano MD  Phone Number: [phone]

## 2024-05-29 LAB
CASE RPRT: NORMAL
CLINICAL INFO: NORMAL
IMMUNE STAIN STUDY: NORMAL
PATH REPORT.FINAL DX SPEC: NORMAL
PATH REPORT.FINAL DX SPEC: NORMAL
PATH REPORT.GROSS SPEC: NORMAL

## 2024-06-03 ENCOUNTER — TELEPHONE (OUTPATIENT)
Dept: PRIMARY CARE | Facility: CLINIC | Age: 79
End: 2024-06-03
Payer: MEDICARE

## 2024-06-03 RX ORDER — LORAZEPAM 0.5 MG/1
0.5 TABLET ORAL EVERY 6 HOURS PRN
Qty: 20 TABLET | Refills: 0 | Status: SHIPPED | OUTPATIENT
Start: 2024-06-03 | End: 2024-07-03

## 2024-08-20 ENCOUNTER — APPOINTMENT (OUTPATIENT)
Dept: RADIOLOGY | Age: 79
End: 2024-08-20
Attending: STUDENT IN AN ORGANIZED HEALTH CARE EDUCATION/TRAINING PROGRAM
Payer: MEDICARE

## 2024-08-20 ENCOUNTER — HOSPITAL ENCOUNTER (OUTPATIENT)
Facility: CLINIC | Age: 79
Discharge: HOME | End: 2024-08-20
Attending: STUDENT IN AN ORGANIZED HEALTH CARE EDUCATION/TRAINING PROGRAM
Payer: MEDICARE

## 2024-08-20 VITALS
HEART RATE: 72 BPM | SYSTOLIC BLOOD PRESSURE: 98 MMHG | DIASTOLIC BLOOD PRESSURE: 62 MMHG | RESPIRATION RATE: 18 BRPM | TEMPERATURE: 97.2 F | OXYGEN SATURATION: 95 %

## 2024-08-20 DIAGNOSIS — M25.532 ACUTE PAIN OF LEFT WRIST: Primary | ICD-10-CM

## 2024-08-20 DIAGNOSIS — M25.561 ACUTE PAIN OF RIGHT KNEE: ICD-10-CM

## 2024-08-20 PROCEDURE — 99213 OFFICE O/P EST LOW 20 MIN: CPT | Performed by: STUDENT IN AN ORGANIZED HEALTH CARE EDUCATION/TRAINING PROGRAM

## 2024-08-20 PROCEDURE — 73562 X-RAY EXAM OF KNEE 3: CPT | Mod: RT | Performed by: STUDENT IN AN ORGANIZED HEALTH CARE EDUCATION/TRAINING PROGRAM

## 2024-08-20 PROCEDURE — 73110 X-RAY EXAM OF WRIST: CPT | Mod: LT | Performed by: STUDENT IN AN ORGANIZED HEALTH CARE EDUCATION/TRAINING PROGRAM

## 2024-08-20 RX ORDER — PREGABALIN 150 MG/1
CAPSULE ORAL
COMMUNITY
Start: 2024-08-13

## 2024-08-20 RX ORDER — MOMETASONE FUROATE 1 MG/G
CREAM TOPICAL SEE ADMIN INSTRUCTIONS
COMMUNITY

## 2024-08-21 ENCOUNTER — TELEPHONE (OUTPATIENT)
Dept: PRIMARY CARE | Facility: CLINIC | Age: 79
End: 2024-08-21
Payer: MEDICARE

## 2024-08-21 NOTE — TELEPHONE ENCOUNTER
Request for Medical Advice (NON-URGENT)   Patient PCP: Catarino Slaughter MD  New or Existing Issue: New  Question or Concern: Pt had a fall 8/21 and broke her left wrist and right knee. She went to urgent care, she has an appt for ortho 8/22. Pt daughter Margareth would like a return call to discuss getting pt a wheel chair or walker and home care because pt can't walk at all. Declined appt   Preferred Pharmacy:   [facility]/pharmacy #[id number] - [place] - [address] AT [place] [address] [address]  [address] [address]  Phone: [phone] Fax: [phone]      The practice will reach out to discuss your Medical Question or Concern within 2 business days.

## 2024-08-21 NOTE — ED PROVIDER NOTES
History  Chief Complaint   Patient presents with    right knee and left wrist injury      Presenting with left wrist and right knee pain status post mechanical fall which occurred this afternoon.  Patient denies head injury.  Patient takes aspirin daily.  Patient does not take anticoagulation or blood thinners.  Patient reports tenderness and mild swelling to the left wrist and right knee.          Past Medical History:   Diagnosis Date    Coronary artery disease     COVID-19 09/24/2023    Hyperparathyroidism (CMS/HCC)     Hypertension     Implantable loop recorder present 07/06/2021    LBBB (left bundle branch block)     Obesity (BMI 30.0-34.9)     Recurrent falls     Stroke (CMS/HCC)     Type 2 diabetes mellitus (CMS/[facility])        Past Surgical History:   Procedure Laterality Date    CAROTID ENDARTERECTOMY Left 12/01/2023    CARPAL TUNNEL RELEASE Right     CARPAL TUNNEL RELEASE Left 01/2023    CORONARY STENT PLACEMENT  11/22/2021    HYSTERECTOMY      Partial    TONSILLECTOMY      WRIST SURGERY Right 09/15/2023       No family history on file.    Social History     Tobacco Use    Smoking status: Never    Smokeless tobacco: Never   Vaping Use    Vaping Use: Never used   Substance Use Topics    Alcohol use: Yes     Comment: Rarely    Drug use: Not Currently       Review of Systems   All other systems reviewed and are negative.      Physical Exam  ED Triage Vitals [08/20/24 1943]   Temp Heart Rate Resp BP SpO2   36.2 °C (97.2 °F) 72 18 98/62 95 %      Temp src Heart Rate Source Patient Position BP Location FiO2 (%) (Set)   -- -- Sitting Right upper arm --       Physical Exam  Constitutional:       Appearance: Normal appearance.   Musculoskeletal:         General: Swelling, tenderness and signs of injury present. No deformity.   Skin:     General: Skin is warm and dry.   Neurological:      Mental Status: She is alert and oriented to person, place, and time.   Psychiatric:         Mood and Affect: Mood normal.          Behavior: Behavior normal.           Procedures  Procedures    UC Course       Medical Decision Making  Acute, comminuted, impacted and distal volarly angulated intra-articular fracture of the left distal radius. Acute, nondisplaced transverse intra-articular fracture of the midportion of the patella of the right knee.  Patient will be provided a volar wrist brace, as well as a hinged knee brace.  Patient may bear weight as tolerated.  Pain can be controlled with acetaminophen and ice compresses (20 minutes on and 20 minutes off, 72 hours).  The patient will need to follow-up with orthopedic surgery for fracture management.                       Marquis Leahy,   08/20/24 2052

## 2024-08-21 NOTE — DISCHARGE INSTRUCTIONS
Acute, comminuted, impacted and distal volarly angulated intra-articular fracture of the left distal radius. Acute, nondisplaced transverse intra-articular fracture of the midportion of the patella of the right knee.  Patient will be provided a volar wrist brace, as well as a hinged knee brace.  Patient may bear weight as tolerated.  Pain can be controlled with acetaminophen and ice compresses (20 minutes on and 20 minutes off, 72 hours).  The patient will need to follow-up with orthopedic surgery for fracture management.

## 2024-08-22 NOTE — TELEPHONE ENCOUNTER
FILIBERTOM asking daughter to call back if she still needs orders completed through Dr. Slaughter's office. Otherwise, she can discuss the need [facility]

## 2024-10-18 ENCOUNTER — TRANSCRIBE ORDERS (OUTPATIENT)
Dept: REGISTRATION | Facility: CLINIC | Age: 79
End: 2024-10-18

## 2024-10-18 ENCOUNTER — HOSPITAL ENCOUNTER (OUTPATIENT)
Dept: RADIOLOGY | Facility: CLINIC | Age: 79
Discharge: HOME | End: 2024-10-18
Attending: SURGERY
Payer: MEDICARE

## 2024-10-18 DIAGNOSIS — S52.502A UNSPECIFIED FRACTURE OF THE LOWER END OF LEFT RADIUS, INITIAL ENCOUNTER FOR CLOSED FRACTURE: ICD-10-CM

## 2024-10-18 DIAGNOSIS — S52.502A UNSPECIFIED FRACTURE OF THE LOWER END OF LEFT RADIUS, INITIAL ENCOUNTER FOR CLOSED FRACTURE: Primary | ICD-10-CM

## 2024-10-18 PROCEDURE — 73110 X-RAY EXAM OF WRIST: CPT | Mod: LT

## 2024-11-02 PROBLEM — C01 MALIGNANT NEOPLASM OF BASE OF TONGUE (CMS/HCC): Status: ACTIVE | Noted: 2024-06-27

## 2025-03-07 NOTE — PROGRESS NOTES
Vancomycin Assessment    Apolonia Williamson is a 68 y o  female who is currently receiving vancomycin 750 q 12h for bacteremia; empiric tx meningitis    Relevant clinical data and objective history reviewed:  Creatinine   Date Value Ref Range Status   10/21/2018 0 82 0 60 - 1 30 mg/dL Final     Comment:     Standardized to IDMS reference method   10/20/2018 0 89 0 60 - 1 20 mg/dL Final     Comment:     Standardized to IDMS reference method   10/19/2018 0 83 0 60 - 1 20 mg/dL Final     Comment:     Standardized to IDMS reference method   10/19/2018 0 83 0 60 - 1 20 mg/dL Final     Comment:     Standardized to IDMS reference method     /77 (BP Location: Left arm)   Pulse 67   Temp 97 6 °F (36 4 °C) (Temporal)   Resp 18   SpO2 96%   I/O last 3 completed shifts: In: 1000 [I V :1000]  Out: 1222 [Urine:1222]  Lab Results   Component Value Date/Time    BUN 6 10/21/2018 05:32 AM    WBC 10 21 (H) 10/21/2018 05:32 AM    HGB 10 5 (L) 10/21/2018 05:32 AM    HCT 30 6 (L) 10/21/2018 05:32 AM    MCV 87 10/21/2018 05:32 AM     10/21/2018 05:32 AM     Temp Readings from Last 3 Encounters:   10/21/18 97 6 °F (36 4 °C) (Temporal)   10/20/18 99 6 °F (37 6 °C) (Temporal)     Vancomycin Days of Therapy: 3    Assessment/Plan  The patient is currently on vancomycin utilizing scheduled dosing  Baseline risks associated with therapy include: advanced age  The patient is receiving 750 q 12h with the most recent vancomycin level being at steady-state and sub-therapeutic based on a goal of 15-20 (appropriate for most indications) ; therefore, after clinical evaluation will be changed to 1250 q 12 h   Pharmacy will continue to follow closely for s/sx of nephrotoxicity, infusion reactions and appropriateness of therapy  BMP and CBC will be ordered per protocol  Plan for trough as patient approaches steady state, prior to the 4th  dose at approximately 10/23/18 @ 0800   Pharmacy will continue to follow the patients culture results and clinical progress daily      Arpita Bustillos, Pharmacist never

## 2025-06-11 DIAGNOSIS — E11.69 MIXED DIABETIC HYPERLIPIDEMIA ASSOCIATED WITH TYPE 2 DIABETES MELLITUS (CMS/HCC): ICD-10-CM

## 2025-06-11 DIAGNOSIS — E78.2 MIXED DIABETIC HYPERLIPIDEMIA ASSOCIATED WITH TYPE 2 DIABETES MELLITUS (CMS/HCC): ICD-10-CM
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
## (undated) DEVICE — SYRINGE DISP LUER-LOK 10 CC

## (undated) DEVICE — SHEET DRAPE 3/4 REVERSEFOLD 53X77

## (undated) DEVICE — DRESSING TELFA 3X4

## (undated) DEVICE — TUBE SUCTION 1/4INX20FT STERILE

## (undated) DEVICE — DRAPE SPLIT 77X120 10/CS

## (undated) DEVICE — PACK BASIC IV SIRUS 5/CS

## (undated) DEVICE — TEETH GUARD

## (undated) DEVICE — SPONGE NEUROSURGICAL W0.5XL0.5IN RAYON/POLYESTER PATTIE HIGH

## (undated) DEVICE — GLOVE SZ 8 PROTEXIS PI